# Patient Record
Sex: FEMALE | Race: WHITE | NOT HISPANIC OR LATINO
[De-identification: names, ages, dates, MRNs, and addresses within clinical notes are randomized per-mention and may not be internally consistent; named-entity substitution may affect disease eponyms.]

---

## 2017-11-01 ENCOUNTER — TRANSCRIPTION ENCOUNTER (OUTPATIENT)
Age: 52
End: 2017-11-01

## 2017-12-06 ENCOUNTER — APPOINTMENT (OUTPATIENT)
Dept: FAMILY MEDICINE | Facility: CLINIC | Age: 52
End: 2017-12-06
Payer: COMMERCIAL

## 2017-12-06 ENCOUNTER — NON-APPOINTMENT (OUTPATIENT)
Age: 52
End: 2017-12-06

## 2017-12-06 VITALS
OXYGEN SATURATION: 98 % | BODY MASS INDEX: 25.91 KG/M2 | HEART RATE: 80 BPM | SYSTOLIC BLOOD PRESSURE: 114 MMHG | DIASTOLIC BLOOD PRESSURE: 72 MMHG | HEIGHT: 68 IN | WEIGHT: 171 LBS

## 2017-12-06 DIAGNOSIS — Z83.3 FAMILY HISTORY OF DIABETES MELLITUS: ICD-10-CM

## 2017-12-06 PROCEDURE — 93000 ELECTROCARDIOGRAM COMPLETE: CPT

## 2017-12-06 PROCEDURE — 99396 PREV VISIT EST AGE 40-64: CPT | Mod: 25

## 2018-01-04 ENCOUNTER — APPOINTMENT (OUTPATIENT)
Dept: FAMILY MEDICINE | Facility: CLINIC | Age: 53
End: 2018-01-04

## 2018-01-23 LAB
25(OH)D3 SERPL-MCNC: 35.2 NG/ML
ALBUMIN SERPL ELPH-MCNC: 4.2 G/DL
ALP BLD-CCNC: 65 U/L
ALT SERPL-CCNC: 18 U/L
ANION GAP SERPL CALC-SCNC: 11 MMOL/L
AST SERPL-CCNC: 20 U/L
BASOPHILS # BLD AUTO: 0.02 K/UL
BASOPHILS NFR BLD AUTO: 0.3 %
BILIRUB SERPL-MCNC: 0.5 MG/DL
BUN SERPL-MCNC: 15 MG/DL
CALCIUM SERPL-MCNC: 9.4 MG/DL
CHLORIDE SERPL-SCNC: 102 MMOL/L
CHOLEST SERPL-MCNC: 188 MG/DL
CHOLEST/HDLC SERPL: 2.2 RATIO
CO2 SERPL-SCNC: 27 MMOL/L
CREAT SERPL-MCNC: 0.6 MG/DL
EOSINOPHIL # BLD AUTO: 0.11 K/UL
EOSINOPHIL NFR BLD AUTO: 1.7 %
GLUCOSE SERPL-MCNC: 85 MG/DL
HAV IGM SER QL: NONREACTIVE
HBV CORE IGM SER QL: NONREACTIVE
HBV SURFACE AG SER QL: NONREACTIVE
HCT VFR BLD CALC: 46.2 %
HCV AB SER QL: NONREACTIVE
HCV S/CO RATIO: 0.08 S/CO
HDLC SERPL-MCNC: 86 MG/DL
HGB BLD-MCNC: 14.4 G/DL
HIV1+2 AB SPEC QL IA.RAPID: NONREACTIVE
HSV 1+2 IGG SER IA-IMP: NEGATIVE
HSV 1+2 IGG SER IA-IMP: POSITIVE
HSV1 IGG SER QL: 2.98 INDEX
HSV1 IGM SER QL: NORMAL TITER
HSV2 AB FLD-ACNC: NORMAL TITER
HSV2 IGG SER QL: 0.08 INDEX
IMM GRANULOCYTES NFR BLD AUTO: 0.2 %
LDLC SERPL CALC-MCNC: 92 MG/DL
LYMPHOCYTES # BLD AUTO: 2.42 K/UL
LYMPHOCYTES NFR BLD AUTO: 36.8 %
MAN DIFF?: NORMAL
MCHC RBC-ENTMCNC: 30.1 PG
MCHC RBC-ENTMCNC: 31.2 GM/DL
MCV RBC AUTO: 96.7 FL
MONOCYTES # BLD AUTO: 0.51 K/UL
MONOCYTES NFR BLD AUTO: 7.8 %
NEUTROPHILS # BLD AUTO: 3.5 K/UL
NEUTROPHILS NFR BLD AUTO: 53.2 %
PLATELET # BLD AUTO: 237 K/UL
POTASSIUM SERPL-SCNC: 5.2 MMOL/L
PROT SERPL-MCNC: 6.7 G/DL
RBC # BLD: 4.78 M/UL
RBC # FLD: 14.2 %
SODIUM SERPL-SCNC: 140 MMOL/L
T PALLIDUM AB SER QL IA: NEGATIVE
T4 FREE SERPL-MCNC: 1.2 NG/DL
TRIGL SERPL-MCNC: 51 MG/DL
TSH SERPL-ACNC: 1.67 UIU/ML
WBC # FLD AUTO: 6.57 K/UL

## 2019-03-12 ENCOUNTER — APPOINTMENT (OUTPATIENT)
Dept: FAMILY MEDICINE | Facility: CLINIC | Age: 54
End: 2019-03-12
Payer: COMMERCIAL

## 2019-03-12 VITALS
HEART RATE: 84 BPM | BODY MASS INDEX: 26.22 KG/M2 | WEIGHT: 173 LBS | SYSTOLIC BLOOD PRESSURE: 110 MMHG | DIASTOLIC BLOOD PRESSURE: 70 MMHG | HEIGHT: 68 IN

## 2019-03-12 PROCEDURE — 99396 PREV VISIT EST AGE 40-64: CPT

## 2019-03-12 NOTE — PLAN
[FreeTextEntry1] : COLONOSCOPY ADVISED\par NEED MAMMOGRAM RESULTS\par ROUTINE GYN EXAMS\par EKG: DECLINED\par HEALTHY DIET AND LIFESTYLE MODIFICATIONS\par HEALTHY WEIGHT LOSS \par CHECK ROUTINE LAB WORK\par FOLLOW-UP ALL SPECIALISTS AS DIRECTED \par CONSIDER SHINGLES VACCINE\par CALL WITH ANY QUESTIONS, CONCERNS OR CHANGES

## 2019-03-12 NOTE — HISTORY OF PRESENT ILLNESS
[FreeTextEntry1] : wellness exam [de-identified] : MS. CHOWDHURY IS A PLEASANT 54 YO PRESENTING FOR YEARLY WELLNESS EXAM.  FEELING WELL TODAY.  HISTORY OF POSITIVE MADHURI PREVIOUSLY EVALUATED BY RHEUMATOLOGY AND NO INTERVENTION WAS NEEDED.  HAS A CHRONIC HISTORY OF VARICOSE VEINS - PLANS TO SEE VASCULAR SURGERY.   HAS HAD NO HEADACHE, DIZZINESS, CHEST PAIN, SHORTNESS OF BREATH, GI OR URINARY COMPLAINTS.  NO OTHER COMPLAINTS TODAY. \par \par DERMATOLOGY: DR. GILES - YEARLY SCREENING\par YEARLY EYE EXAM\par GYN: DR. ROLAND

## 2019-03-12 NOTE — HEALTH RISK ASSESSMENT
[Good] : ~his/her~  mood as  good [No falls in past year] : Patient reported no falls in the past year [0] : 2) Feeling down, depressed, or hopeless: Not at all (0) [Patient reported mammogram was normal] : Patient reported mammogram was normal [Patient reported PAP Smear was normal] : Patient reported PAP Smear was normal [HIV test declined] : HIV test declined [Hepatitis C test declined] : Hepatitis C test declined [None] : None [With Family] : lives with family [Employed] : employed [College] : College [] :  [# Of Children ___] : has [unfilled] children [Feels Safe at Home] : Feels safe at home [Fully functional (bathing, dressing, toileting, transferring, walking, feeding)] : Fully functional (bathing, dressing, toileting, transferring, walking, feeding) [Fully functional (using the telephone, shopping, preparing meals, housekeeping, doing laundry, using] : Fully functional and needs no help or supervision to perform IADLs (using the telephone, shopping, preparing meals, housekeeping, doing laundry, using transportation, managing medications and managing finances) [Smoke Detector] : smoke detector [Carbon Monoxide Detector] : carbon monoxide detector [Safety elements used in home] : safety elements used in home [Seat Belt] :  uses seat belt [Sunscreen] : uses sunscreen [With Patient/Caregiver] : With Patient/Caregiver [Designated Healthcare Proxy] : Designated healthcare proxy [Name: ___] : Health Care Proxy's Name: [unfilled]  [Relationship: ___] : Relationship: [unfilled] [] : No [de-identified] : SOCIALLY [de-identified] : GOES TO THE GYM 4 - 5 DAYS WEEK, CYCLE/SPIN, BODY CONDITIONING [de-identified] : DIET IS "GOOD", WEIGHT WATCHERS [[id number]] : 0 [Change in mental status noted] : No change in mental status noted [Language] : denies difficulty with language [Learning/Retaining New Information] : denies difficulty learning/retaining new information [Handling Complex Tasks] : denies difficulty handling complex tasks [High Risk Behavior] : no high risk behavior [Reports changes in hearing] : Reports no changes in hearing [Reports changes in vision] : Reports no changes in vision [Reports normal functional visual acuity (ie: able to read med bottle)] : Reports poor functional visual acuity.  [Reports changes in dental health] : Reports no changes in dental health [MammogramDate] : 01/19 [MammogramComments] : [facility] [PapSmearDate] : 01/19 [PapSmearComments] : DR. BENTON [de-identified] : PART-TIME [de-identified] : [employer]  [AdvancecareDate] : 03/19 [FreeTextEntry4] : SECONDARY HEALTH CARE PROXY: MAGGY CHOWDHURY - DAUGHTER

## 2019-03-27 ENCOUNTER — RESULT REVIEW (OUTPATIENT)
Age: 54
End: 2019-03-27

## 2019-03-27 LAB
25(OH)D3 SERPL-MCNC: 19.3 NG/ML
ALBUMIN SERPL ELPH-MCNC: 4.2 G/DL
ALP BLD-CCNC: 64 U/L
ALT SERPL-CCNC: 19 U/L
ANION GAP SERPL CALC-SCNC: 13 MMOL/L
APPEARANCE: CLEAR
AST SERPL-CCNC: 24 U/L
BASOPHILS # BLD AUTO: 0.03 K/UL
BASOPHILS NFR BLD AUTO: 0.5 %
BILIRUB SERPL-MCNC: 0.4 MG/DL
BILIRUBIN URINE: NEGATIVE
BLOOD URINE: NEGATIVE
BUN SERPL-MCNC: 14 MG/DL
CALCIUM SERPL-MCNC: 9.4 MG/DL
CHLORIDE SERPL-SCNC: 102 MMOL/L
CHOLEST SERPL-MCNC: 201 MG/DL
CHOLEST/HDLC SERPL: 2.3 RATIO
CO2 SERPL-SCNC: 25 MMOL/L
COLOR: COLORLESS
CREAT SERPL-MCNC: 0.63 MG/DL
EOSINOPHIL # BLD AUTO: 0.1 K/UL
EOSINOPHIL NFR BLD AUTO: 1.7 %
GLUCOSE QUALITATIVE U: NEGATIVE
GLUCOSE SERPL-MCNC: 86 MG/DL
HCT VFR BLD CALC: 45 %
HDLC SERPL-MCNC: 87 MG/DL
HEMOCCULT STL QL IA: NEGATIVE
HGB BLD-MCNC: 14.3 G/DL
IMM GRANULOCYTES NFR BLD AUTO: 0.2 %
KETONES URINE: NEGATIVE
LDLC SERPL CALC-MCNC: 99 MG/DL
LEUKOCYTE ESTERASE URINE: NEGATIVE
LYMPHOCYTES # BLD AUTO: 2.33 K/UL
LYMPHOCYTES NFR BLD AUTO: 39.3 %
MAN DIFF?: NORMAL
MCHC RBC-ENTMCNC: 30.6 PG
MCHC RBC-ENTMCNC: 31.8 GM/DL
MCV RBC AUTO: 96.2 FL
MONOCYTES # BLD AUTO: 0.5 K/UL
MONOCYTES NFR BLD AUTO: 8.4 %
NEUTROPHILS # BLD AUTO: 2.96 K/UL
NEUTROPHILS NFR BLD AUTO: 49.9 %
NITRITE URINE: NEGATIVE
PH URINE: 7
PLATELET # BLD AUTO: 213 K/UL
POTASSIUM SERPL-SCNC: 4.4 MMOL/L
PROT SERPL-MCNC: 6.6 G/DL
PROTEIN URINE: NEGATIVE
RBC # BLD: 4.68 M/UL
RBC # FLD: 13.4 %
SODIUM SERPL-SCNC: 140 MMOL/L
SPECIFIC GRAVITY URINE: 1
T4 FREE SERPL-MCNC: 1.2 NG/DL
TRIGL SERPL-MCNC: 76 MG/DL
TSH SERPL-ACNC: 2.29 UIU/ML
UROBILINOGEN URINE: NORMAL
WBC # FLD AUTO: 5.93 K/UL

## 2019-03-29 LAB — ANA SER IF-ACNC: NEGATIVE

## 2019-09-29 ENCOUNTER — TRANSCRIPTION ENCOUNTER (OUTPATIENT)
Age: 54
End: 2019-09-29

## 2019-10-01 ENCOUNTER — APPOINTMENT (OUTPATIENT)
Dept: ULTRASOUND IMAGING | Facility: CLINIC | Age: 54
End: 2019-10-01
Payer: COMMERCIAL

## 2019-10-01 ENCOUNTER — RESULT REVIEW (OUTPATIENT)
Age: 54
End: 2019-10-01

## 2019-10-01 ENCOUNTER — OUTPATIENT (OUTPATIENT)
Dept: OUTPATIENT SERVICES | Facility: HOSPITAL | Age: 54
LOS: 1 days | End: 2019-10-01
Payer: COMMERCIAL

## 2019-10-01 DIAGNOSIS — Z00.00 ENCOUNTER FOR GENERAL ADULT MEDICAL EXAMINATION WITHOUT ABNORMAL FINDINGS: ICD-10-CM

## 2019-10-01 PROCEDURE — 10005 FNA BX W/US GDN 1ST LES: CPT

## 2019-10-01 PROCEDURE — 88173 CYTOPATH EVAL FNA REPORT: CPT

## 2019-10-01 PROCEDURE — 88173 CYTOPATH EVAL FNA REPORT: CPT | Mod: 26

## 2019-10-02 ENCOUNTER — APPOINTMENT (OUTPATIENT)
Dept: ORTHOPEDIC SURGERY | Facility: CLINIC | Age: 54
End: 2019-10-02
Payer: COMMERCIAL

## 2019-10-02 PROCEDURE — 99203 OFFICE O/P NEW LOW 30 MIN: CPT

## 2019-10-02 PROCEDURE — 72170 X-RAY EXAM OF PELVIS: CPT

## 2019-10-02 NOTE — PHYSICAL EXAM
[de-identified] : General Exam\par \par Well developed, well nourished\par No apparent distress\par Oriented to person, place, and time\par Mood: Normal\par Affect: Normal\par Balance and coordination: Normal\par Gait: Normal\par \par Right hip exam\par \par Skin: Clean/dry and intact\par Inspection: No obvious deformity, no swelling, no ecchymosis.\par Tenderness:  no tenderness over greater trochanter/glut medius insertion. No tenderness pubic symphysis, pubic tubercle, hip flexors. No ttp ischial tuberosity or buttock. No ttp over the ASIS/Illiac crest.\par ROM: 0-120°. Internal rotation 20 external rotation 70\par Painful ROM: None\par Additional tests: No pain with circumduction negative impingement test at 90° mildly positive impingement test at 60° negative Manisha negative [facility]\par Strength: 4/5 hip flexion 5/5 ADD/ABD/Q/H/TA/GS/EHL\par Neuro: Sensation in tact to light touch throughout in dp/sp/tib/jocelin/saph distributions\par Pulses: 2+ DP/PT pulses\par  [de-identified] : \par The following radiographs were ordered and read by me during this patients visit. I reviewed each radiograph in detail with the patient and discussed the findings as highlighted below. \par \par AP pelvis radiograph is obtained today. There is no fracture. bilateral cam-type acetabular\par

## 2019-10-02 NOTE — DISCUSSION/SUMMARY
[de-identified] : \par Right quadriceps strain. She has pain in her anterior quadriceps region she has weakness with resisted hip flexion she has no pain with hip range of motion given prescription for Mobic take with food stop for stomach upset physical therapy followup one month all questions answered\par

## 2019-10-02 NOTE — HISTORY OF PRESENT ILLNESS
[de-identified] : 54y female presents with proximal right thigh pain x1 month. She states she went to a total body conditioning workout class and did not stretch after. Since then she has had the pain without radiation or numbness/tingling. She has tried Motrin with some relief. She states that since she started resting it this week it is doing better. \par \par The patient's past medical history, past surgical history, medications, allergies, and social history were reviewed by me today with the patient and documented accordingly. In addition, the patient's family history, which is noncontributory to this visit, was also reviewed.\par

## 2019-10-30 ENCOUNTER — APPOINTMENT (OUTPATIENT)
Dept: ORTHOPEDIC SURGERY | Facility: CLINIC | Age: 54
End: 2019-10-30

## 2019-11-19 ENCOUNTER — APPOINTMENT (OUTPATIENT)
Dept: ORTHOPEDIC SURGERY | Facility: CLINIC | Age: 54
End: 2019-11-19
Payer: COMMERCIAL

## 2019-11-19 PROCEDURE — 99213 OFFICE O/P EST LOW 20 MIN: CPT

## 2019-11-19 NOTE — HISTORY OF PRESENT ILLNESS
[de-identified] : 54y female presents with proximal right thigh pain x 2-3 months. She states she went to a total body conditioning workout class and did not stretch after. Since then she has had the pain without radiation or numbness/tingling. She has tried Motrin with some relief. She has been going to PT for at least 6 weeks with some relief but she feels like she has plateaued.

## 2019-11-19 NOTE — DISCUSSION/SUMMARY
[de-identified] : 54-year-old female continued right hip pain and weakness for almost 3 months since significant weakness of quadriceps testing continued groin pain. We'll obtain an MRI to further evaluate she will followup after MRI. All questions answered

## 2019-11-19 NOTE — PHYSICAL EXAM
[de-identified] : Right hip exam\par \par Skin: Clean/dry and intact\par Inspection: No obvious deformity, no swelling, no ecchymosis.\par Tenderness: no tenderness over greater trochanter/glut medius insertion. No tenderness pubic symphysis, pubic tubercle, hip flexors. No ttp ischial tuberosity or buttock. No ttp over the ASIS/Illiac crest.\par ROM: 0-120°. Internal rotation 20 external rotation 70\par Painful ROM: None\par Additional tests: No pain with circumduction negative impingement test at 90° mildly positive impingement test at 60° negative [facility]\par Strength: 4/5 hip flexion 5/5 ADD/ABD/Q/H/TA/GS/EHL\par Neuro: Sensation in tact to light touch throughout in dp/sp/tib/jocelin/saph distributions\par Pulses: 2+ DP/PT pulses

## 2019-11-26 ENCOUNTER — OTHER (OUTPATIENT)
Age: 54
End: 2019-11-26

## 2019-12-03 ENCOUNTER — APPOINTMENT (OUTPATIENT)
Dept: ORTHOPEDIC SURGERY | Facility: CLINIC | Age: 54
End: 2019-12-03
Payer: COMMERCIAL

## 2019-12-03 VITALS
HEIGHT: 68 IN | WEIGHT: 173 LBS | BODY MASS INDEX: 26.22 KG/M2 | DIASTOLIC BLOOD PRESSURE: 80 MMHG | SYSTOLIC BLOOD PRESSURE: 119 MMHG | HEART RATE: 80 BPM

## 2019-12-03 PROCEDURE — 99203 OFFICE O/P NEW LOW 30 MIN: CPT | Mod: 25

## 2019-12-03 PROCEDURE — 20611 DRAIN/INJ JOINT/BURSA W/US: CPT | Mod: RT

## 2019-12-03 NOTE — PHYSICAL EXAM
[de-identified] : Constitutional: Well-nourished, well-developed, No acute distress\par Respiratory:  Good respiratory effort, no SOB\par Lymphatic: No regional lymphadenopathy, no lymphedema\par Psychiatric: Pleasant and normal affect, alert and oriented x3\par Skin: Clean dry and intact B/L UE/LE\par Musculoskeletal: normal except where as noted in regional exam\par \par Right Hip:\par \par APPEARANCE: no marked deformities, no swelling or malalignment\par POSITIVE TENDERNESS: Hip flexor region, sartorius, proximal rectus femoris\par NONTENDER: greater trochanter, TFL, gluteal region, ischium/proximal hamstring region, and pubic symphysis. \par ROM: Limited in all planes due to pain. \par RESISTIVE TESTING: MMT 4+/5 and mild pain with hip flexion, painless resisted ER/IR/SLR/abduction/adduction. \par SPECIAL TESTS: + LATIA/FADIR, mild pain with loaded flexion & scouring. neg fulcrum test\par NEURO: Normal sensation of LE, DTRs 2+/4 patella and achilles\par PULSES: 2+ DP/PT pulses\par

## 2019-12-03 NOTE — HISTORY OF PRESENT ILLNESS
[de-identified] : Patient is here today for continued management of hip pain. The patient was previously evaluated by my associate Dr. Nolasco who diagnosed hip osteoarthritis. He recommended an ultrasound guided cortisone injection for diagnostic and therapeutic purposes, and the patient has presented for that injection today. No significant interval change in the overall condition since last evaluated.

## 2019-12-03 NOTE — PROCEDURE
[de-identified] : Ultrasound-Guided Diagnostic/Therapeutic Injection: Right Hip Intra-articular Injection.\par \par Indication for U/S Guidance: Ensure placement within the femoroacetabular joint for diagnostic purposes, while avoiding anterior neurovascular structures\par \par Indication for Injection: Osteoarthritis.\par \par A discussion was had with the patient regarding this procedure and all questions were answered. All risks, benefits and alternatives were discussed. These included but were not limited to bleeding, infection, and allergic reaction.  A timeout was done to ensure correct side and pt agreed to the procedure.   Betadine was used to sterilize and prep the area, and alcohol was used to clean the skin in the anterior aspect of the hip joint. The femoroacetabular joint was visualized utilizing the [employer] II Edge, the Curvilinear 30cm 5-2 MHz transducer, sterile probe cover and sterile ultrasound gel.  The joint was visualized in the longitudinal axis and an in-plane approach was used for the injection.  Ultrasound guidance was utilized to ensure accuracy of the intra-articular injection, and avoid the femoral neurovascular structures.  A 25-gauge 1.5" needle was first used to inject 3cc of 1% lidocaine without epi into the subcutaneous tissue and muscle for local anesthesia.  Following that a 22-gauge 3" needle was used to inject 2cc of 0.25% bupivacaine and 1cc of 40mg/ml methylprednisolone into the femoroacetabular joint. An image confirming the correct location of the needle placement and infusion of the steroid at the end of the injection was saved.  A sterile bandage was then applied. The patient tolerated the procedure well and there were no complications. \par \par

## 2019-12-03 NOTE — DISCUSSION/SUMMARY
[de-identified] : Discussed findings of today's exam and possible causes of patient's pain.  Educated patient on their most probable diagnosis of Right hip osteoarthritis.  Reviewed possible courses of treatment, and we collaboratively decided best course of treatment at this time will include diagnostic/therapeutic ultrasound guided steroid injection today (see procedure note).  Patient advised to make note of whether their pain is improved starting in the next few minutes until 4-6 hours while the lidocaine numbs the interior of the hip joint; this is the diagnostic part of the injection. If pain is relieved immediately that helps us determine that the etiology of the pain is coming from within the hip joint. The steroid injected into the hip today will start to have an effect in the coming days, will be most effective in the next 1-2 weeks, and may last 1-2 months; that is the therapeutic portion of this injection. \par Re-examination of the involved hip after the injection revealed noted improvement.  The patient should follow up with Dr. Nolasco, in 2 months or prn for further management.  Patient appreciates and agrees with current plan.\par \par This note was generated using dragon medical dictation software. A reasonable effort has been made for proofreading its contents, but typos may still remain. If there are any questions or points of clarification needed please notify my office.

## 2019-12-18 ENCOUNTER — APPOINTMENT (OUTPATIENT)
Dept: RHEUMATOLOGY | Facility: CLINIC | Age: 54
End: 2019-12-18
Payer: COMMERCIAL

## 2019-12-18 VITALS
WEIGHT: 183 LBS | HEART RATE: 89 BPM | SYSTOLIC BLOOD PRESSURE: 120 MMHG | HEIGHT: 68 IN | DIASTOLIC BLOOD PRESSURE: 80 MMHG | OXYGEN SATURATION: 98 % | BODY MASS INDEX: 27.74 KG/M2

## 2019-12-18 PROCEDURE — 99204 OFFICE O/P NEW MOD 45 MIN: CPT

## 2019-12-18 RX ORDER — MELOXICAM 15 MG/1
15 TABLET ORAL
Qty: 30 | Refills: 1 | Status: DISCONTINUED | COMMUNITY
Start: 2019-10-02 | End: 2019-12-18

## 2019-12-18 NOTE — REVIEW OF SYSTEMS
[Joint Pain] : joint pain [Arthralgias] : arthralgias [Feeling Tired] : feeling tired [Negative] : Endocrine

## 2019-12-18 NOTE — PHYSICAL EXAM
[General Appearance - Alert] : alert [General Appearance - Well Developed] : well developed [General Appearance - Well Nourished] : well nourished [Neck Appearance] : the appearance of the neck was normal [Sclera] : the sclera and conjunctiva were normal [Oropharynx] : the oropharynx was normal [Auscultation Breath Sounds / Voice Sounds] : lungs were clear to auscultation bilaterally [Respiration, Rhythm And Depth] : normal respiratory rhythm and effort [Edema] : there was no peripheral edema [Full Pulse] : the pedal pulses are present [Heart Sounds] : normal S1 and S2 [Abdomen Tenderness] : non-tender [Supraclavicular Lymph Nodes Enlarged Bilaterally] : supraclavicular [Cervical Lymph Nodes Enlarged Anterior Bilaterally] : anterior cervical [No Spinal Tenderness] : no spinal tenderness [FreeTextEntry1] : antalgic gait, FROM neck, shoulders, elbows, wrists, hands, hips, knees, ankles and feet, including the small joints of the hands and feet without any evidence of inflammatory arthritis right hip with restricted ROM and OA [] : no rash [Deep Tendon Reflexes (DTR)] : deep tendon reflexes were 2+ and symmetric [Motor Exam] : the motor exam was normal [Oriented To Time, Place, And Person] : oriented to person, place, and time [Impaired Insight] : insight and judgment were intact [Affect] : the affect was normal

## 2019-12-18 NOTE — HISTORY OF PRESENT ILLNESS
[FreeTextEntry1] : 54-year-old  female with no significant past medical history presents somewhat urgently as a new patient today. She states that she was in her usual state of health until September, right around menopause she started to notice pain in her right hip she noticed that she was limping. She is very active goes to the gym regularly, she uses the stairmaster as well as splints regularly. She went to see orthopedics and was told that she had moderate to severe osteoarthritis in her hip. She has since had a steroid injection with minimal relief. The plan is now to get a second opinion and then consider a hip replacement. She rates her current pain at a 7/10. It is affecting her activities of daily living and she is unable to exercise but she really misses.\par \par More recently she noticed that her left eye was red, she went to see ophthalmology and was told that she may have a systemic process. It turns out that her mother has rheumatoid arthritis. She denies blurry vision or visual symptoms or photophobia. She denies ocular symptoms in the past.\par \par She denies patchy alopecia, oral ulcers, sicca symptoms Raynaud's phenomenon. She denies arises or colitis. She denies asthma, frequent sinus infections or ear infections. She admits to 2 miscarriages and has had 2 full-term normal deliveries. She denies any issues with thromboembolism.\par \par She is an average appetite and denies weight loss. She denies fevers or chills or night sweats. She has been using meloxicam with minimal relief. She admits to morning stiffness for at least 30 minutes.

## 2019-12-18 NOTE — ASSESSMENT
[FreeTextEntry1] : 54-year-old  female with no significant past medical history presents as a new patient today. She states that she was in her usual state of health until about 3 months ago when she noticed right hip pain, she hasn't seen orthopedics and has been diagnosed with moderate to severe osteoarthritis. She is status post a steroid injection with minimal relief. More recently she has been diagnosed with possibly scleritis or episcleritis in her left eye and has recently started steroid drops. Off note her mother has rheumatoid arthritis.\par \par Her current review of systems is positive for pain in both hips but the right is worse than the left. She admits to morning stiffness. This is her first episode of inflammation in the eyes.\par \par Today, on examination there is a paucity of clinical findings to support the diagnosis of an underlying connective tissue disease. She does have osteoarthritis in her right hip with an antalgic gait as well as limited range of motion.\par \par At this time the most likely diagnosis is osteoarthritis in the right hip. The question remains whether she has an underlying autoimmune process.\par \par Right hip osteoarthritis-\par -she is waiting for a second opinion with orthopedics\par -She may be a surgical candidate at this point\par -To try indomethacin extended-release once a day p.r.n. with food\par \par NSAID use\par Risks and benefits of NSAIDS were d/w patient including but not limited to hypertension, nephrotoxicity, GI intolerance , increased risk for thromboembolism, CHF, hepatotoxicity and anemia.\par \par \par Ocular inflammation-\par -she has a recent diagnosis of either scleritis or episcleritis\par -Her review of systems is otherwise negative for an underlying connective tissue disease\par -For completion I have ordered additional labs including a workup to rule out rheumatoid arthritis, lupus, vasculitis, spondyloarthropathy and infection such as hepatitis and syphilis.\par \par \par I will be calling her with these labs. She will return to see me on a p.r.n. basis depending on the lab results and her evaluation with ophthalmology\par \par She is aware to call me if her symptoms worsen

## 2019-12-20 ENCOUNTER — LABORATORY RESULT (OUTPATIENT)
Age: 54
End: 2019-12-20

## 2019-12-31 ENCOUNTER — APPOINTMENT (OUTPATIENT)
Dept: ORTHOPEDIC SURGERY | Facility: CLINIC | Age: 54
End: 2019-12-31
Payer: COMMERCIAL

## 2019-12-31 VITALS — BODY MASS INDEX: 27.28 KG/M2 | HEIGHT: 68 IN | WEIGHT: 180 LBS

## 2019-12-31 LAB
25(OH)D3 SERPL-MCNC: 35.5 NG/ML
ALBUMIN SERPL ELPH-MCNC: 4.4 G/DL
ALP BLD-CCNC: 84 U/L
ALT SERPL-CCNC: 17 U/L
ANA PAT FLD IF-IMP: ABNORMAL
ANA SER IF-ACNC: ABNORMAL
ANION GAP SERPL CALC-SCNC: 12 MMOL/L
AST SERPL-CCNC: 24 U/L
B BURGDOR AB SER-IMP: NEGATIVE
B BURGDOR IGM PATRN SER IB-IMP: NEGATIVE
B BURGDOR18KD IGG SER QL IB: NORMAL
B BURGDOR23KD IGG SER QL IB: NORMAL
B BURGDOR23KD IGM SER QL IB: NORMAL
B BURGDOR28KD IGG SER QL IB: NORMAL
B BURGDOR30KD IGG SER QL IB: PRESENT
B BURGDOR31KD IGG SER QL IB: NORMAL
B BURGDOR39KD IGG SER QL IB: NORMAL
B BURGDOR39KD IGM SER QL IB: NORMAL
B BURGDOR41KD IGG SER QL IB: PRESENT
B BURGDOR41KD IGM SER QL IB: NORMAL
B BURGDOR45KD IGG SER QL IB: NORMAL
B BURGDOR58KD IGG SER QL IB: NORMAL
B BURGDOR66KD IGG SER QL IB: NORMAL
B BURGDOR93KD IGG SER QL IB: NORMAL
BASOPHILS # BLD AUTO: 0.03 K/UL
BASOPHILS NFR BLD AUTO: 0.4 %
BILIRUB SERPL-MCNC: 0.5 MG/DL
BUN SERPL-MCNC: 13 MG/DL
CALCIUM SERPL-MCNC: 9.7 MG/DL
CARDIOLIPIN IGM SER-MCNC: 7.2 MPL
CARDIOLIPIN IGM SER-MCNC: <5 GPL
CCP AB SER IA-ACNC: <8 UNITS
CHLORIDE SERPL-SCNC: 102 MMOL/L
CO2 SERPL-SCNC: 29 MMOL/L
CREAT SERPL-MCNC: 0.78 MG/DL
CREAT SPEC-SCNC: 110 MG/DL
CREAT/PROT UR: 0.1 RATIO
CRP SERPL-MCNC: 0.84 MG/DL
DSDNA AB SER-ACNC: <12 IU/ML
ENA RNP AB SER IA-ACNC: >8 AL
ENA SM AB SER IA-ACNC: 0.2 AL
ENA SS-A AB SER IA-ACNC: <0.2 AL
ENA SS-B AB SER IA-ACNC: <0.2 AL
EOSINOPHIL # BLD AUTO: 0.1 K/UL
EOSINOPHIL NFR BLD AUTO: 1.4 %
ERYTHROCYTE [SEDIMENTATION RATE] IN BLOOD BY WESTERGREN METHOD: 19 MM/HR
GLUCOSE SERPL-MCNC: 94 MG/DL
HAV IGM SER QL: NONREACTIVE
HBV CORE IGM SER QL: NONREACTIVE
HBV SURFACE AG SER QL: NONREACTIVE
HCT VFR BLD CALC: 44.2 %
HCV AB SER QL: NONREACTIVE
HCV S/CO RATIO: 0.09 S/CO
HGB BLD-MCNC: 14.2 G/DL
HLA-B27 RELATED AG QL: NORMAL
IMM GRANULOCYTES NFR BLD AUTO: 0.3 %
LUPUS ANTICOAGULANT CASCADE REFLEX: NORMAL
LYMPHOCYTES # BLD AUTO: 2.36 K/UL
LYMPHOCYTES NFR BLD AUTO: 32.7 %
MAN DIFF?: NORMAL
MCHC RBC-ENTMCNC: 30.3 PG
MCHC RBC-ENTMCNC: 32.1 GM/DL
MCV RBC AUTO: 94.2 FL
MONOCYTES # BLD AUTO: 0.58 K/UL
MONOCYTES NFR BLD AUTO: 8 %
MPO AB + PR3 PNL SER: NORMAL
NEUTROPHILS # BLD AUTO: 4.12 K/UL
NEUTROPHILS NFR BLD AUTO: 57.2 %
PLATELET # BLD AUTO: 230 K/UL
POTASSIUM SERPL-SCNC: 4.1 MMOL/L
PROT SERPL-MCNC: 6.9 G/DL
PROT UR-MCNC: 7 MG/DL
RBC # BLD: 4.69 M/UL
RBC # FLD: 13.2 %
RF+CCP IGG SER-IMP: NEGATIVE
RHEUMATOID FACT SER QL: <10 IU/ML
RPR SER-TITR: NORMAL
SODIUM SERPL-SCNC: 143 MMOL/L
THYROGLOB AB SERPL-ACNC: <20 IU/ML
THYROPEROXIDASE AB SERPL IA-ACNC: <10 IU/ML
WBC # FLD AUTO: 7.21 K/UL

## 2019-12-31 PROCEDURE — 99213 OFFICE O/P EST LOW 20 MIN: CPT

## 2019-12-31 NOTE — REASON FOR VISIT
[Initial Visit] : an initial visit for [Hip Pain] : hip pain [Spouse] : spouse [Family Member] : family member

## 2019-12-31 NOTE — HISTORY OF PRESENT ILLNESS
[de-identified] : This is a very nice 54 year old woman experiencing Rt hip pain and difficulty walking since Sept 2019.  The pain is moderate in intensity, but she states she is more concerned with the function of the hip.  The pain is exacerbated by walking and stairs.  Medications she has tried include: mobic which did not help.  She was evaluated by the rheumatologist.  She has tried physical therapy with no relief.  She has not been using a cane / walker / wheelchair.  She has had a prior injection of cortisone which helped only about 20% she estimates for a few hours.  She denies numbness and tingling in the extremity.  The pain somewhat limits activities of daily living. Walking tolerance is reduced. \par \par

## 2019-12-31 NOTE — PHYSICAL EXAM
[de-identified] : Patient is well nourished, well-developed, in no acute distress, with appropriate mood and affect. The patient is oriented to time, place, and person. Gait evaluation reveals a limp. There is no inguinal adenopathy. Examination of the contralateral hip shows normal range of motion, strength, no tenderness, and intact skin. The affected limb is well-perfused, shows a grossly normal motor and sensory examination. Examination of the hip shows no skin lesions. Hip motion is reduced and causes pain. Leg lengths are approximately equal. Stinchfield test is positive. Both hips are stable and muscle strength is normal. Pedal pulses are palpable.\par \par  [de-identified] : AP and lateral x-rays of the right hip, pelvis, and femur were brought in by the patient which I reviewed and demonstrate mild degenerative joint disease of the hip with joint space narrowing, osteophyte formation, and subchondral sclerosis as well as coxa magna.\par \par The patient brings in an MRI of the right hip that demonstrates moderate osteoarthritis and degeneration in the right hip with labral tearing.

## 2019-12-31 NOTE — DISCUSSION/SUMMARY
[de-identified] : This patient has right hip mild to moderate osteoarthritis with degenerative labral tearing and coxa magna.  I am concerned about the fact that she only experienced 20% relief with the intra-articular hip cortisone injection with the bupivacaine.  It is possible that the pain she is experiencing is also coming from her lumbar spine I would like her to see a physiatry us for further work-up and management of this.  If the pain still is not improving then we can was consider total hip arthroplasty in the future.  Follow-up on a as needed basis.

## 2020-01-03 ENCOUNTER — APPOINTMENT (OUTPATIENT)
Dept: PHYSICAL MEDICINE AND REHAB | Facility: CLINIC | Age: 55
End: 2020-01-03
Payer: COMMERCIAL

## 2020-01-03 VITALS
HEART RATE: 74 BPM | SYSTOLIC BLOOD PRESSURE: 118 MMHG | TEMPERATURE: 98.5 F | DIASTOLIC BLOOD PRESSURE: 78 MMHG | OXYGEN SATURATION: 98 %

## 2020-01-03 DIAGNOSIS — M47.817 SPONDYLOSIS W/OUT MYELOPATHY OR RADICULOPATHY, LUMBOSACRAL REGION: ICD-10-CM

## 2020-01-03 PROCEDURE — 99204 OFFICE O/P NEW MOD 45 MIN: CPT

## 2020-01-05 NOTE — HISTORY OF PRESENT ILLNESS
[FreeTextEntry1] : Location: right hip and low back\par Quality: sharp in groin, low back is aching (low back is mild, hip pain is severe)\par Duration: 4 months \par Frequency: intermittent, but constant with movement - hip, intermittent in low back  ( worst after prolonged immobility) \par Alleviating Factors:rest (hip), stretching (low back)\par Aggravating Factors: walking (hip), (immobility (low back)\par Conservative treatment tried: had hip steroid injection for hip pain, nothing for low back\par Associated Symptoms: no paresthesia\par Prior Studies: MRI of right hip shows moderate to severe OA\par

## 2020-01-05 NOTE — ASSESSMENT
[FreeTextEntry1] : Will discuss with Ortho about diagnostic Marcaine injection into hip joint with fluoroscopic guidance\par \par Start PT for low back pain\par \par Protonix for GI prophylaxis as patient is planning on taking Diclofenac.\par

## 2020-01-05 NOTE — PHYSICAL EXAM
[FreeTextEntry1] : \par \par \par   [Antalgic Gait Right] : antalgic on the right [Normal Motor Tone] : the muscle tone was normal [Involuntary Movements] : no involuntary movements were seen [de-identified] : No gross deformity, TTP of the lumbar paraspinals bilaterally, ROM limited with extension and oblique extension bilaterally, SLR  negative bilaterally\par  [Normal] : Oriented to person, place, and time, insight and judgement were intact and the affect was normal

## 2020-01-06 ENCOUNTER — TRANSCRIPTION ENCOUNTER (OUTPATIENT)
Age: 55
End: 2020-01-06

## 2020-01-14 ENCOUNTER — APPOINTMENT (OUTPATIENT)
Dept: PHYSICAL MEDICINE AND REHAB | Facility: CLINIC | Age: 55
End: 2020-01-14

## 2020-01-24 ENCOUNTER — APPOINTMENT (OUTPATIENT)
Dept: PHYSICAL MEDICINE AND REHAB | Facility: CLINIC | Age: 55
End: 2020-01-24

## 2020-01-29 LAB
ANA PAT FLD IF-IMP: ABNORMAL
ANA PATTERN: ABNORMAL
ANA SER IF-ACNC: ABNORMAL
ANA TITER: ABNORMAL
CENTROMERE IGG SER-ACNC: <0.2 CD:130001892
ENA RNP AB SER IA-ACNC: >8 AL
ENA SM AB SER IA-ACNC: 0.2 AL

## 2020-02-03 ENCOUNTER — APPOINTMENT (OUTPATIENT)
Dept: RHEUMATOLOGY | Facility: CLINIC | Age: 55
End: 2020-02-03
Payer: COMMERCIAL

## 2020-02-03 VITALS
SYSTOLIC BLOOD PRESSURE: 120 MMHG | HEART RATE: 99 BPM | WEIGHT: 182 LBS | OXYGEN SATURATION: 97 % | DIASTOLIC BLOOD PRESSURE: 72 MMHG | BODY MASS INDEX: 27.67 KG/M2 | TEMPERATURE: 97.7 F

## 2020-02-03 PROCEDURE — 99214 OFFICE O/P EST MOD 30 MIN: CPT

## 2020-02-03 NOTE — ASSESSMENT
[FreeTextEntry1] : 54-year-old  female with no significant past medical history. She states that she was in her usual state of health until about 3 months ago when she noticed right hip pain, she hast seen orthopedics and has been diagnosed with moderate to severe osteoarthritis. She is status post a steroid injection with minimal relief. \par \par Her current review of systems is positive for pain in both hips but the right is worse than the left. She admits to morning stiffness. \par \par Today, on examination there is a paucity of clinical findings to support the diagnosis of an underlying connective tissue disease. She does have osteoarthritis in her right hip with an antalgic gait as well as limited range of motion.\par \par At this time the most likely diagnosis is osteoarthritis in the right hip.\par \par Right hip osteoarthritis-\par -she is waiting for a second opinion with orthopedics\par -She may be a surgical candidate at this point\par -To try indomethacin extended-release once a day p.r.n. with food\par \par NSAID use\par Risks and benefits of NSAIDS were d/w patient including but not limited to hypertension, nephrotoxicity, GI intolerance , increased risk for thromboembolism, CHF, hepatotoxicity and anemia.\par \par \par Ocular inflammation-\par -she has a recent diagnosis of either scleritis or episcleritis\par -I obtained her medical records, it looks like she was treated for conjunctivitis with TobraDex eyedrops\par \par Positive MADHURI-\par On Last set of labs her MADHURI is positive, she has a RNP antibody. The significance of these labs is unclear. She denies Raynaud's phenomenon. At this time she does not have any features consistent with lupus myositis scleroderma. We'll continue with clinical surveillance. For completion she is to have a baseline chest x-ray and a baseline echocardiogram\par \par Followup 8-9 months unless there is any change in her symptoms

## 2020-02-03 NOTE — HISTORY OF PRESENT ILLNESS
[FreeTextEntry1] : 54-year-old  female with no significant past medical history. She states that she was in her usual state of health until September, right around menopause she started to notice pain in her right hip she noticed that she was limping. She is very active goes to the gym regularly, she uses the stairmaster as well as splints regularly. She went to see orthopedics and was told that she had moderate to severe osteoarthritis in her hip. She has since had a steroid injection with minimal relief. The plan is now to get a second opinion and then consider a hip replacement. She rates her current pain at a 7/10. It is affecting her activities of daily living and she is unable to exercise but she really misses.\par \par More recently she noticed that her left eye was red, she went to see ophthalmology and was told that she may have a systemic process. It turns out that her mother has rheumatoid arthritis. She denies blurry vision or visual symptoms or photophobia. She denies ocular symptoms in the past.\par \par She denies patchy alopecia, oral ulcers, sicca symptoms Raynaud's phenomenon. She denies arises or colitis. She denies asthma, frequent sinus infections or ear infections. She admits to 2 miscarriages and has had 2 full-term normal deliveries. She denies any issues with thromboembolism.\par \par I had to return for a followup, her labs reveal a RNP antibody on 2 occasions.\par \par She is an average appetite and denies weight loss. She denies fevers or chills or night sweats. She has been using meloxicam with minimal relief. She admits to morning stiffness for at least 30 minutes.

## 2020-02-03 NOTE — PHYSICAL EXAM
[General Appearance - Alert] : alert [General Appearance - Well Nourished] : well nourished [General Appearance - Well Developed] : well developed [Sclera] : the sclera and conjunctiva were normal [Oropharynx] : the oropharynx was normal [Neck Appearance] : the appearance of the neck was normal [Respiration, Rhythm And Depth] : normal respiratory rhythm and effort [Auscultation Breath Sounds / Voice Sounds] : lungs were clear to auscultation bilaterally [Heart Sounds] : normal S1 and S2 [Full Pulse] : the pedal pulses are present [Edema] : there was no peripheral edema [Abdomen Tenderness] : non-tender [Cervical Lymph Nodes Enlarged Anterior Bilaterally] : anterior cervical [Supraclavicular Lymph Nodes Enlarged Bilaterally] : supraclavicular [No Spinal Tenderness] : no spinal tenderness [FreeTextEntry1] : antalgic gait, FROM neck, shoulders, elbows, wrists, hands, hips, knees, ankles and feet, including the small joints of the hands and feet without any evidence of inflammatory arthritis right hip with restricted ROM and OA [Deep Tendon Reflexes (DTR)] : deep tendon reflexes were 2+ and symmetric [] : no rash [Motor Exam] : the motor exam was normal [Oriented To Time, Place, And Person] : oriented to person, place, and time [Impaired Insight] : insight and judgment were intact [Affect] : the affect was normal

## 2020-02-07 ENCOUNTER — APPOINTMENT (OUTPATIENT)
Dept: ORTHOPEDIC SURGERY | Facility: CLINIC | Age: 55
End: 2020-02-07
Payer: COMMERCIAL

## 2020-02-07 VITALS
HEART RATE: 73 BPM | WEIGHT: 182 LBS | DIASTOLIC BLOOD PRESSURE: 81 MMHG | HEIGHT: 68 IN | SYSTOLIC BLOOD PRESSURE: 135 MMHG | BODY MASS INDEX: 27.58 KG/M2

## 2020-02-07 PROCEDURE — 73502 X-RAY EXAM HIP UNI 2-3 VIEWS: CPT | Mod: RT

## 2020-02-07 PROCEDURE — 99214 OFFICE O/P EST MOD 30 MIN: CPT

## 2020-02-07 NOTE — ADDENDUM
[FreeTextEntry1] : I, Jean Paul Jewell, acted solely as a scribe for Dr. Tejinder Bowman on this date 02/07/2020.

## 2020-02-07 NOTE — HISTORY OF PRESENT ILLNESS
[7] : a current pain level of 7/10 [Intermit.] : ~He/She~ states the symptoms seem to be intermittent [Walking] : worsened by walking [Rest] : relieved by rest [Physical Therapy] : relieved by physical therapy [Worsening] : worsening [de-identified] : 54 year old female here for evaluation of right hip pain.  Patent states pain started in September 2019 after starting gym.  patient locates pain to anterior hip and groin. She reports pain is worsening overall. She reports pain is worse with ascending stairs and ambulating. She notes limitations to her activities due to pain. Patient was on indocin for pain from Rheumatology however stopped due to intolerance. patient was on Mobic with no relief. Patient was seen in December by Dr. Callahan who administered Intra-articular injection to right with with minimal improvement. patient was seen by Dr. Wang as well and PMR due to back pain. Patient has attempted PT in the past noting improvement.

## 2020-02-07 NOTE — REVIEW OF SYSTEMS
[Joint Pain] : joint pain [Joint Stiffness] : joint stiffness [Joint Swelling] : joint swelling [Negative] : Heme/Lymph [FreeTextEntry9] : right hip

## 2020-02-07 NOTE — PHYSICAL EXAM
[LE] : Sensory: Intact in bilateral lower extremities [ALL] : dorsalis pedis, posterior tibial, femoral, popliteal, and radial 2+ and symmetric bilaterally [Normal] : Alert and in no acute distress [Antalgic] : antalgic [Obese] : not obese [Poor Appearance] : well-appearing [Acute Distress] : not in acute distress [de-identified] : Right hip exam shows able to straight leg raise with pain, positive Stinchfield maneuver, groin pain with external rotation, she has lost internal rotation, no tenderness over the greater trochanteric bursa. \par Left hip exam shows able to straight leg raise without pain. [de-identified] : GENERAL APPEARANCE: Well nourished and hydrated, pleasant, alert, and oriented x 3. Appears their stated age. \par HEENT: Normocephalic, extraocular eye motion intact. Nasal septum midline. Oral cavity clear. External auditory canal clear. \par RESPIRATORY: Breath sounds clear and audible in all lobes. No wheezing, No accessory muscle use.\par CARDIOVASCULAR: No apparent abnormalities. No lower leg edema. No varicosities. Pedal pulses are palpable.\par NEUROLOGIC: Sensation is normal, no muscle weakness in the upper or lower extremities.\par DERMATOLOGIC: No apparent skin lesions, moist, warm, no rash.\par SPINE: Cervical spine appears normal and moves freely; thoracic spine appears normal and moves freely; lumbosacral spine appears normal and moves freely, normal, nontender.\par MUSCULOSKELETAL: Hands, wrists, and elbows are normal and move freely, shoulders are normal and move freely.  [de-identified] : MRI of the right hip obtained 11/21/2019 at [facility] shows bilateral hip joint effusions, moderate on the right, trace on the left. Chronic degenerative tear of the anterosuperior labrum of the right acetabulum, with a 1.1 cm paralabral ganglion cyst. Moderate-to-severe right hip osteoarthritis. 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 See above, correlate clinically.\par \par 3V Xray of the right hip and pelvis done in office today and reviewed by Dr. Tejinder Bowman demonstrates  moderate to early advanced osteoarthritis of the right hip.

## 2020-02-07 NOTE — DISCUSSION/SUMMARY
[Medication Risks Reviewed] : Medication risks reviewed [Surgical risks reviewed] : Surgical risks reviewed [de-identified] : 54 year old  female with right hip osteoarthritis. Her MRI of the right hip from November 2019 demonstrated moderate-to-severe right hip osteoarthritis. Based on imaging she is a candidate for right KORY, but I recommended that she continue with conservative treatment for as long as possible. She has already failed to respond to intraarticular cortisone injection. We discussed use of anti-inflammatory medication for pain relief, and I provided her with a prescription for Diclofenac. F/U 6 weeks for reassessment. \par \par Total hip arthroplasty: A hip replacement means a resurfacing of both surfaces of the hip, with metal, ceramic and/or plastic parts. The prosthetic parts are usually press fit into bone, and only rarely cemented into position. Patients are allowed to weight bear as tolerated in most cases. The postoperative motion is determined by multiple factors the most important of which is preoperative motion. In general, the better the motion pre operatively, the better the motion post operatively. The operation, pending medical clearance, generally requires a hospitalization of 3-4 days. In general, we prefer to perform the procedure under epidural or general anesthesia. Preoperatively we institute a nomogram for blood management which may include the administration of procrit. The operative procedure takes approximately 1-2 hours. The operation requires an oblique incision anywhere from 4-6 inches over the posterolateral hip region. Post operatively the patient is walking the day of or the day after surgery. The first couple of days are very painful and the pain medication will alleviate but not eliminate the pain. Thus the patient must really push hard to get their mobility back. Our goal for having the person go home is that they can walk with a walker or a cane. The walking aide is to be dispensed with once the patient is secure enough. In general, there is no cast or braces required with a routine hip replacement. In the long term, we do not encourage our patients to run for the sake of running; although, pending their pre operative status, we often allow patients to play doubles tennis or comparable activities. We also allow them to do gentle intermediate downhill skiing if they are truly an expert skier. Biking is encouraged as well as swimming. The follow up periods are usually at 3 weeks, 6 weeks, 3 months, and yearly intervals. Potential complications with total hip replacements include anaesthetic complications and death, infection (less than 1%), nerve damage, and in the case of a sciatic nerve injury, a permanent foot drop, (a flapping foot with ambulation that requires bracing). There are always areas of skin numbness but this is not an untoward effect nor do we consider it a complication. Other potential complications include dislocation of the hip component (less than 5%). In cases of recurrent dislocation revision surgery may be required. The loosening of either the acetabular or femoral component is much more infrequent. The components may wear, creating particulate debris, loosening and systemic complications. Specific concerns exist with all bearing surfaces such as metal sensitivity with metal on metal components, and the risk of fracture and squeaking with ceramic components. Major blood vessel damage is also extremely rare. Theoretically because of the anatomic proximity of the femoral artery, it could be lacerated with subsequent repairs required. Albeit unlikely, a disruption of the femoral artery could theoretically result in an amputation. Similarly, infection could theoretically result in an amputation if one were to grow out an organism that cannot be controlled with antibiotics. Most infections require removal of the prosthesis, placement of an antibiotic spacer, IV antibiotics for eradication, prior to reimplantation. General medical complications include phlebitis for which we prophylactically anticoagulate but it could still occur and fatal pulmonary embolus has been reported. Cardiovascular problems, such as a heart attack or ischemia are always a concern with such hemodynamic changes in the blood vascular system. There is a risk of leg length discrepancy and the need for a shoe lift. Other general complications are very rare but anything in medicine could theoretically happen. I think the patient understands the risk benefit ratio of total hip replacement and will think about whether they would like to pursue an operative or non-operative option.  I reviewed the plan of care as well as a model of a total hip implant equivalent to the one that will be used for their total hip joint replacement. The patient agreed to the plan of care as well as the use of implants for their hip total joint replacement.\par

## 2020-04-07 ENCOUNTER — EMERGENCY (EMERGENCY)
Facility: HOSPITAL | Age: 55
LOS: 0 days | Discharge: HOME | End: 2020-04-07
Payer: COMMERCIAL

## 2020-04-07 VITALS
DIASTOLIC BLOOD PRESSURE: 67 MMHG | HEART RATE: 96 BPM | OXYGEN SATURATION: 97 % | SYSTOLIC BLOOD PRESSURE: 112 MMHG | TEMPERATURE: 100 F | RESPIRATION RATE: 18 BRPM

## 2020-04-07 DIAGNOSIS — R05 COUGH: ICD-10-CM

## 2020-04-07 DIAGNOSIS — K50.90 CROHN'S DISEASE, UNSPECIFIED, WITHOUT COMPLICATIONS: ICD-10-CM

## 2020-04-07 DIAGNOSIS — M79.10 MYALGIA, UNSPECIFIED SITE: ICD-10-CM

## 2020-04-07 DIAGNOSIS — B34.9 VIRAL INFECTION, UNSPECIFIED: ICD-10-CM

## 2020-04-07 DIAGNOSIS — R50.9 FEVER, UNSPECIFIED: ICD-10-CM

## 2020-04-07 PROCEDURE — 99283 EMERGENCY DEPT VISIT LOW MDM: CPT

## 2020-04-07 PROCEDURE — 99283 EMERGENCY DEPT VISIT LOW MDM: CPT | Mod: 25

## 2020-04-07 PROCEDURE — 87635 SARS-COV-2 COVID-19 AMP PRB: CPT

## 2020-04-07 NOTE — ED STATDOCS - OBJECTIVE STATEMENT
She has a PMHx of Crohn's dx.  SHe presents reporting 3-4 days of cough, fever, chills, body aches.  Her daughter is an RT at [employer] so she is concerned she was exposed to COVID19.

## 2020-04-07 NOTE — ED ADULT TRIAGE NOTE - CHIEF COMPLAINT QUOTE
Patient presents for COVID-19 testing; complains of fever, chills.   Patient provides verbal consent to receive results via text or email.

## 2020-04-07 NOTE — ED STATDOCS - NSFOLLOWUPINSTRUCTIONS_ED_ALL_ED_FT
How to get your Coronavirus (COVID-19) Testing Results:   Please be advised that you were tested for the coronavirus (COVID-19) in the Emergency Department at [facility].  You are to maintain self-quarantine procedures for 14 days until instructed otherwise by one of our healthcare agents. Please note that the test may take up to 2-4 days to result.  If you do not hear from us within 72 hours and you'd like to check on your results, you can call on of our coronavirus specialists at [address] [place] [address] (available 24/7).  Please DO NOT call the site where you received the test to obtain your results.

## 2020-04-07 NOTE — ED STATDOCS - PATIENT PORTAL LINK FT
You can access the FollowMyHealth Patient Portal offered by [facility] by registering at the following website: [email or web address]. By joining [facility]’s FollowMyHealth portal, you will also be able to view your health information using other applications (apps) compatible with our system.

## 2020-04-08 LAB — SARS-COV-2 RNA SPEC QL NAA+PROBE: SIGNIFICANT CHANGE UP

## 2020-04-14 ENCOUNTER — APPOINTMENT (OUTPATIENT)
Dept: FAMILY MEDICINE | Facility: CLINIC | Age: 55
End: 2020-04-14
Payer: COMMERCIAL

## 2020-04-14 VITALS — BODY MASS INDEX: 27.28 KG/M2 | HEIGHT: 68 IN | RESPIRATION RATE: 12 BRPM | WEIGHT: 180 LBS

## 2020-04-14 PROCEDURE — 99213 OFFICE O/P EST LOW 20 MIN: CPT | Mod: 95

## 2020-04-14 NOTE — HISTORY OF PRESENT ILLNESS
[Home] : at home, [unfilled] , at the time of the visit. [Medical Office: ([facility])___] : at ~his/her~ medical office located in [place] [Patient] : the patient [Self] : self [FreeTextEntry8] : START TIME: 11:03\par END TIME: 11:16\par \par MS. CHOWDHURY IS A PLEASANT 55 YO.  TODAY WITH COMPLAINT OF SYMPTOMS SINCE APRIL 4TH.  WAS HAVING FEVER AND CHILLS AS WELL AS BODY ACHES.  GOT TESTED FOR COVID AND WAS ADVISED IT WAS NEGATIVE.  FEVER RESOLVED.  HAD HEADACHES.  FINGER TIPS TINGLING SENSATION.  STILL TIRED AND ACHY.  SOME CHILLS.  LAST FEVER WAS ON APRIL 7TH.  NO COUGH .  VERY LITTLE CONGESTION.  NO SORE THROAT.  NO DIARRHEA.  HAS TAKEN TYLENOL.  DAUGHTER IS A RESPIRATORY THERAPIST AT THE HOSPITAL.  IS FEELING BETTER.  NO DIZZINESS OR SYNCOPE.  NO SHORTNESS OF BREATH OR WHEEZING.

## 2020-04-14 NOTE — PHYSICAL EXAM
[No Acute Distress] : no acute distress [Well Nourished] : well nourished [Well-Appearing] : well-appearing [No Respiratory Distress] : no respiratory distress  [Normal Affect] : the affect was normal [Normal Mood] : the mood was normal [Normal Insight/Judgement] : insight and judgment were intact

## 2020-04-14 NOTE — REVIEW OF SYSTEMS
[Chills] : chills [Fatigue] : fatigue [Nasal Discharge] : nasal discharge [Headache] : headache [Earache] : no earache [Sore Throat] : no sore throat [Chest Pain] : no chest pain [Palpitations] : no palpitations [Shortness Of Breath] : no shortness of breath [Wheezing] : no wheezing [Cough] : no cough [Abdominal Pain] : no abdominal pain [Diarrhea] : diarrhea [Vomiting] : no vomiting [Dizziness] : no dizziness [Fainting] : no fainting

## 2020-04-14 NOTE — PLAN
[FreeTextEntry1] : ALTHOUGH COVID TESTING NEGATIVE; SYMPTOMS STILL SUGGESTIVE OF POSITIVE COVID AND POSSIBLE FALSE NEGATIVE NEGATIVE\par SYMPTOMS HAVE IMPROVED\par CONTINUE SUPPORTIVE CARE: REST, FLUIDS\par TYLENOL PRN\par TO CALL IF SYMPTOMS PERSIST/WORSEN CHANGE\par REVIEWED WHEN TO ESCALATE CARE; SOB, ETC.\par DISCUSSED CDC RECOMMENDATIONS FOR SELF-ISOLATION\par CALL WITH ANY QUESTIONS, CONCERNS OR CHANGES

## 2020-04-21 ENCOUNTER — TRANSCRIPTION ENCOUNTER (OUTPATIENT)
Age: 55
End: 2020-04-21

## 2020-04-23 VITALS — WEIGHT: 180 LBS | HEIGHT: 68 IN | BODY MASS INDEX: 27.28 KG/M2

## 2020-04-23 RX ORDER — MESALAMINE 1.2 G/1
TABLET, DELAYED RELEASE ORAL
Refills: 0 | Status: DISCONTINUED | COMMUNITY
End: 2020-04-23

## 2020-04-23 RX ORDER — INDOMETHACIN 75 MG/1
75 CAPSULE, EXTENDED RELEASE ORAL DAILY
Qty: 30 | Refills: 2 | Status: DISCONTINUED | COMMUNITY
Start: 2019-12-18 | End: 2020-04-23

## 2020-04-24 ENCOUNTER — APPOINTMENT (OUTPATIENT)
Dept: NEUROLOGY | Facility: CLINIC | Age: 55
End: 2020-04-24
Payer: COMMERCIAL

## 2020-04-24 PROCEDURE — 99203 OFFICE O/P NEW LOW 30 MIN: CPT | Mod: 95

## 2020-04-24 NOTE — PHYSICAL EXAM
[FreeTextEntry1] : Exam limited due to limitations of telephonic visit necessitated by coronavirus situation.\par \par There is no cervical palpation tenderness.\par There is full range of movement of the cervical spine\par \par There is full range of movement of the lumbar spine.\par Straight leg raising is negative bilaterally.\par \par Tinels sign negative at wrists.\par \par Mental status: Alert, fully oriented, speech comprehension intact, recent memory intact\par \par Cranial nerves: No ptosis  Extraocular movements full. Facial strength and sensation are normal. Tongue midline.\par \par Motor: Strength grossly normal throughout. There is no drift. Unable to assess tone. No abnormal movements observed.\par \par Sensation: Intact to touch throughout\par \par Coordination: Finger-nose intact\par \par Reflexes unable to check\par \par Gait grossly normal.\par

## 2020-04-24 NOTE — HISTORY OF PRESENT ILLNESS
[FreeTextEntry1] : Developed fever 4/4. Suspected Covid, but tested negative. 4/12 developed tingling both hands which has persisted. There all the time. Also has moderately severe arm pain. No significant neck pain. Recently developed non-radiating back pain. Also has some tingling around mouth. Nsaid no help. Rheumatology workup has been negative.\par \par Recent GI workup - possible Chron's vs UC

## 2020-04-24 NOTE — REASON FOR VISIT
[Other Location: e.g. Home (Enter Location, City,State)___] : at [unfilled] [Home] : at home, [unfilled] , at the time of the visit. [Spouse] : spouse [Patient] : the patient [Consultation] : a consultation visit [Self] : self

## 2020-04-24 NOTE — ASSESSMENT
[FreeTextEntry1] : Possible carpal tunnel, possible neuropathy ?Covid related.\par \par Will start low dose gabapentin 100mg bid, set up for EMG.

## 2020-05-06 ENCOUNTER — APPOINTMENT (OUTPATIENT)
Dept: ORTHOPEDIC SURGERY | Facility: CLINIC | Age: 55
End: 2020-05-06

## 2020-05-19 ENCOUNTER — RX CHANGE (OUTPATIENT)
Age: 55
End: 2020-05-19

## 2020-06-25 ENCOUNTER — APPOINTMENT (OUTPATIENT)
Dept: NEUROLOGY | Facility: CLINIC | Age: 55
End: 2020-06-25
Payer: COMMERCIAL

## 2020-06-25 VITALS
BODY MASS INDEX: 26.52 KG/M2 | DIASTOLIC BLOOD PRESSURE: 92 MMHG | HEIGHT: 68 IN | HEART RATE: 82 BPM | WEIGHT: 175 LBS | SYSTOLIC BLOOD PRESSURE: 135 MMHG

## 2020-06-25 PROCEDURE — 99244 OFF/OP CNSLTJ NEW/EST MOD 40: CPT

## 2020-06-26 NOTE — REVIEW OF SYSTEMS
[Feeling Poorly] : feeling poorly [Tingling] : tingling [Numbness] : numbness [Abnormal Sensation] : an abnormal sensation [Migraine Headache] : migraine headaches [As Noted in HPI] : as noted in HPI [Arthralgias] : arthralgias [Negative] : Heme/Lymph [Confused or Disoriented] : no confusion [Memory Lapses or Loss] : no memory loss [Difficulty with Language] : no ~M difficulty with language [Decr. Concentrating Ability] : no decrease in concentrating ability [Changed Thought Patterns] : no change in thought patterns [Facial Weakness] : no facial weakness [Arm Weakness] : no arm weakness [Hand Weakness] : no hand weakness [Leg Weakness] : no leg weakness [Poor Coordination] : good coordination [Difficulty Writing] : no difficulty writing [Difficulties in Speech] : no speech difficulties [Hypersensitivity] : no hypersensitivity [Seizures] : no convulsions [Dizziness] : no dizziness [Lightheadedness] : no lightheadedness [Fainting] : no fainting [Vertigo] : no vertigo [Cluster Headache] : no cluster headache [Tension Headache] : no tension-type headache [Difficulty Walking] : no difficulty walking [Inability to Walk] : able to walk [Frequent Falls] : not falling [Ataxia] : no ataxia [Limping] : not limping

## 2020-06-26 NOTE — DATA REVIEWED
[de-identified] : MRI of the brain with IV contrast revealed minimal non-specific white matter signal abnormalities in June 2020.  MRI of the cervical spine in June 2020 showed multilevel degenerative changes without any cord signals and 11 mm nodule of the right thyroid lobe and was compared with the previous MRI of April 2020. [de-identified] : I reviewed SS EP studies which was recorded as abnormal in the ascending somatosensory pathway at the brachial plexus including lower extremity SEPs but I do not have the records of actual tracing. [de-identified] : I reviewed the detailed neurological consultation dated 5/14/2020 with a history of abnormal sensation in the face tongue hands and forearm and across her torso including treatment with IV steroids which were not helpful and her neurological examination revealed diffuse absence of deep tendon reflexes without any significant neurological abnormalities and was advised that she could have transverse myelitis with cranial nerve dysfunction and uveitis raising the possibility of atypical multiple sclerosis and other rheumatological disorders and was advised to continue gabapentin.\par \par There are extensive blood test reports and I reviewed the entire report which shows some inflammatory markers including MADHURI and minimal elevation of sedimentation rate but majority of the rheumatological testing was unremarkable.

## 2020-06-26 NOTE — DISCUSSION/SUMMARY
[FreeTextEntry1] : Opinion–the patient's symptoms begin with uveitis and has sensory dysfunction without any large fiber involvement and objectively there is no evidence of peripheral sensorimotor neuropathy but her deep tendon reflexes are absent and I advised her to obtain a detailed electrophysiologic study and if it is normal I will undertake epidermal biopsy meanwhile she was advised to have a lumbar puncture and CSF analysis and I agree and advised her to pursue the same.  I strongly suspect a rheumatological disorder as some of her blood tests have raised the suspicion of the same and should discuss this again with Dr. Richardson.  I will also call her neurologist to send me the pictures of her SSEP and might repeat it at [facility] because it does not correlate clinically as all large fibers appear completely normal without any evidence of radiculopathy or myelopathy.  I had a detailed conversation with the patient with extensive education and counseling.  She understands and will proceed with my advice.

## 2020-06-26 NOTE — HISTORY OF PRESENT ILLNESS
[FreeTextEntry1] : Ms. Vicente is a 55-year-old  female referred by Dr. Theresa Villarreal for neurological second opinion and also advised by Dr. Chance Giron.\par \par The patient stated that in October 2019 she developed right hip pain and was evaluated by an orthopedic surgeon who treated her with physical therapy and suggested the diagnosis of moderate osteoarthritis and gave her medications and injection and MRI was positive but deferred surgery at the present time and is following the patient.  She occasionally gets left hip discomfort but is not as severe.  In December 2019 she developed bilateral uveitis and was evaluated by rheumatologist and ophthalmologist and extensive investigations have been undertaken and treated with local corticosteroid eyedrops.  She was evaluated by Dr. Richardson her rheumatologist who undertook several blood tests and according to the patient advised her that there were no definite findings.  She was also seen by a gastroenterologist Dr. Glass who undertook colonoscopy in March 2020 because of family history of Crohn's disease and advised her that she does not have Crohn's disease or ulcerative colitis but the blood test reportedly revealed low titers for Crohn's disease and ulcerative colitis but she has no GI symptoms.\par \par She was also evaluated by Dr. Harrison who performed S EP studies which were reportedly abnormal and advised her lumbar puncture for CSF analysis.\par \par Current complaints–at the present time the patient stated that since 4/20/2020 she has tingling sensation in the fingertips affecting all fingers of both hands and tingling sensation around the arms and upper torso.  Mouth area has some painful sensation but below the navel she has no symptoms of any sensorimotor dysfunction and was treated with IV steroids and received 5 courses in May 2020 without any help.\par \par Review of systems failed to reveal any headache diplopia dysarthria dysphagia or dyspnea and there is no vision loss or ptosis and denied any focal or lateralized weakness, bowel bladder or sexual dysfunction and there is no perianal numbness ataxia or incoordination.  There is no history of anxiety depression cognitive language or memory dysfunction.

## 2020-06-26 NOTE — PHYSICAL EXAM
[General Appearance - Alert] : alert [General Appearance - In No Acute Distress] : in no acute distress [Oriented To Time, Place, And Person] : oriented to person, place, and time [Impaired Insight] : insight and judgment were intact [Affect] : the affect was normal [Person] : oriented to person [Place] : oriented to place [Time] : oriented to time [Visual Intact] : visual attention was ~T not ~L decreased [Concentration Intact] : normal concentrating ability [Naming Objects] : no difficulty naming common objects [Repeating Phrases] : no difficulty repeating a phrase [Writing A Sentence] : no difficulty writing a sentence [Fluency] : fluency intact [Comprehension] : comprehension intact [Reading] : reading intact [Past History] : adequate knowledge of personal past history [Cranial Nerves Optic (II)] : visual acuity intact bilaterally,  visual fields full to confrontation, pupils equal round and reactive to light [Cranial Nerves Oculomotor (III)] : extraocular motion intact [Cranial Nerves Trigeminal (V)] : facial sensation intact symmetrically [Cranial Nerves Facial (VII)] : face symmetrical [Cranial Nerves Vestibulocochlear (VIII)] : hearing was intact bilaterally [Cranial Nerves Accessory (XI - Cranial And Spinal)] : head turning and shoulder shrug symmetric [Cranial Nerves Glossopharyngeal (IX)] : tongue and palate midline [Cranial Nerves Hypoglossal (XII)] : there was no tongue deviation with protrusion [Motor Tone] : muscle tone was normal in all four extremities [Motor Strength] : muscle strength was normal in all four extremities [Sensation Tactile Decrease] : light touch was intact [No Muscle Atrophy] : normal bulk in all four extremities [Abnormal Walk] : normal gait [Balance] : balance was intact [2+] : Ankle jerk left 2+ [FreeTextEntry1] : General examination–vital signs were recorded and unremarkable.  Head neck, ear nose and throat was unremarkable.  Throat is clear without any inflammation and currently there is no redness in the eye and the anterior chamber looks normal without any clear evidence of uveitis and lens is normal.  There are no signs of meningeal irritation.  Pedal pulsations are normal.  Cervical and lumbar spine range of motion is normal.  Abdomen is soft and there is no tenderness.  Chest is clear and heart sounds are normal.  There is no leg edema.  I inspected her skin throughout and there is no rash nodularity of the muscles or tenderness.\par \par Neurological examination is completely normal and I did not notice any sensory dysfunction of reproducible nature other than subjective sensation of tingling in the fingertips and upper torso including arms.  There is no Tinel sign.  Her deep tendon reflexes are almost absent everywhere.  There is no history of cold exposure. [Past-pointing] : there was no past-pointing [Tremor] : no tremor present [Plantar Reflex Right Only] : normal on the right [Plantar Reflex Left Only] : normal on the left

## 2020-07-02 ENCOUNTER — APPOINTMENT (OUTPATIENT)
Dept: GASTROENTEROLOGY | Facility: CLINIC | Age: 55
End: 2020-07-02
Payer: COMMERCIAL

## 2020-07-02 ENCOUNTER — NON-APPOINTMENT (OUTPATIENT)
Age: 55
End: 2020-07-02

## 2020-07-02 ENCOUNTER — APPOINTMENT (OUTPATIENT)
Dept: FAMILY MEDICINE | Facility: CLINIC | Age: 55
End: 2020-07-02
Payer: COMMERCIAL

## 2020-07-02 VITALS
SYSTOLIC BLOOD PRESSURE: 138 MMHG | WEIGHT: 175 LBS | BODY MASS INDEX: 26.52 KG/M2 | HEIGHT: 68 IN | HEART RATE: 90 BPM | DIASTOLIC BLOOD PRESSURE: 80 MMHG

## 2020-07-02 VITALS
OXYGEN SATURATION: 98 % | HEART RATE: 95 BPM | DIASTOLIC BLOOD PRESSURE: 60 MMHG | WEIGHT: 174 LBS | TEMPERATURE: 97.9 F | SYSTOLIC BLOOD PRESSURE: 90 MMHG | HEIGHT: 68 IN | BODY MASS INDEX: 26.37 KG/M2

## 2020-07-02 PROCEDURE — 99204 OFFICE O/P NEW MOD 45 MIN: CPT

## 2020-07-02 PROCEDURE — 99396 PREV VISIT EST AGE 40-64: CPT | Mod: 25

## 2020-07-02 PROCEDURE — 93000 ELECTROCARDIOGRAM COMPLETE: CPT

## 2020-07-02 RX ORDER — PANTOPRAZOLE 40 MG/1
40 TABLET, DELAYED RELEASE ORAL
Qty: 30 | Refills: 0 | Status: DISCONTINUED | COMMUNITY
Start: 2020-01-03 | End: 2020-07-02

## 2020-07-02 RX ORDER — PANTOPRAZOLE 20 MG/1
20 TABLET, DELAYED RELEASE ORAL DAILY
Qty: 60 | Refills: 0 | Status: DISCONTINUED | COMMUNITY
Start: 2020-02-07 | End: 2020-07-02

## 2020-07-02 RX ORDER — DICLOFENAC SODIUM 75 MG/1
75 TABLET, DELAYED RELEASE ORAL
Qty: 60 | Refills: 1 | Status: DISCONTINUED | COMMUNITY
Start: 2020-02-07 | End: 2020-07-02

## 2020-07-02 NOTE — ASSESSMENT
[FreeTextEntry1] : Abnormal  ASCA testing\par Suggests increased risk of CD (as does family history, as does her uveitis) BUT is NOT diagnostic of CD\par Given absence of any GI complaints would NOT recommend repeat colonoscopy with biopsies at this time\par As i discussed with the patient, would however have a low threshold for repeat colonoscopy if GI complaints should begin.\par For now, management of her uveitis and neuropathy should direct the course of her treatment\par \par Patient expresses good understanding of my recommendation\par Patient to follow up as needed\par \par patient referred by Dr Theresa Paige

## 2020-07-02 NOTE — HISTORY OF PRESENT ILLNESS
[de-identified] : dictation inactive\par patient in office visit\par second opinion\par \par 55 yr female, recent normal screening colonoscopy.  No biopsies\par NO GI complaints\par \par Also recent diagnosis of uveitis, on topical steroid drops\par Current GI ordered IBD serology testing, elevated ASCA,.\par Started patient on mesalamine for 8 weeks , dc due to neuropathy\par See neurology, ? association\par Awaiting spinal tap\par \par FH Crohn's, her dad

## 2020-07-02 NOTE — PHYSICAL EXAM
[General Appearance - Alert] : alert [Sclera] : the sclera and conjunctiva were normal [PERRL With Normal Accommodation] : pupils were equal in size, round, and reactive to light [Extraocular Movements] : extraocular movements were intact [General Appearance - In No Acute Distress] : in no acute distress [Oropharynx] : the oropharynx was normal [Outer Ear] : the ears and nose were normal in appearance [Neck Appearance] : the appearance of the neck was normal [Jugular Venous Distention Increased] : there was no jugular-venous distention [Neck Cervical Mass (___cm)] : no neck mass was observed [Thyroid Diffuse Enlargement] : the thyroid was not enlarged [Auscultation Breath Sounds / Voice Sounds] : lungs were clear to auscultation bilaterally [Thyroid Nodule] : there were no palpable thyroid nodules [Murmurs] : no murmurs [Heart Rate And Rhythm] : heart rate was normal and rhythm regular [Heart Sounds Gallop] : no gallops [Heart Sounds] : normal S1 and S2 [Heart Sounds Pericardial Friction Rub] : no pericardial rub [Edema] : there was no peripheral edema [Abdomen Soft] : soft [Abdomen Tenderness] : non-tender [Bowel Sounds] : normal bowel sounds [Abdomen Mass (___ Cm)] : no abdominal mass palpated [Cervical Lymph Nodes Enlarged Posterior Bilaterally] : posterior cervical [Cervical Lymph Nodes Enlarged Anterior Bilaterally] : anterior cervical [Supraclavicular Lymph Nodes Enlarged Bilaterally] : supraclavicular [Axillary Lymph Nodes Enlarged Bilaterally] : axillary [Femoral Lymph Nodes Enlarged Bilaterally] : femoral [No CVA Tenderness] : no ~M costovertebral angle tenderness [Inguinal Lymph Nodes Enlarged Bilaterally] : inguinal [Abnormal Walk] : normal gait [No Spinal Tenderness] : no spinal tenderness [Musculoskeletal - Swelling] : no joint swelling seen [Motor Tone] : muscle strength and tone were normal [Nail Clubbing] : no clubbing  or cyanosis of the fingernails [Skin Color & Pigmentation] : normal skin color and pigmentation [Skin Turgor] : normal skin turgor [Impaired Insight] : insight and judgment were intact [] : no rash [Oriented To Time, Place, And Person] : oriented to person, place, and time [Affect] : the affect was normal

## 2020-07-04 NOTE — REVIEW OF SYSTEMS
[Negative] : Psychiatric [Joint Pain] : joint pain [Headache] : no headache [Joint Swelling] : no joint swelling [Fainting] : no fainting [Dizziness] : no dizziness [de-identified] : SEE HPI

## 2020-07-04 NOTE — HISTORY OF PRESENT ILLNESS
[FreeTextEntry1] : CPE [de-identified] : MS. CHOWDHURY IS A PLEASANT 56 YO PRESENTING FOR YEARLY CPE.  HAS BEEN ACTIVELY SEEING OPHTHALMOLOGY WHO DIAGNOSED HER WITH BILATERAL UVEITIS.  ALSO SAW RHEUMATOLOGY AND NEUROLOGY.  WAS EVALUATED FOR A TINGLING SENSATION IN THE FINGERTIPS AROUND THE ARMS AND UPPER TORSO.  ALSO GETS SYMPTOMS IN HER MOUTH.  SHE HAS HAD FIVE ROUNDS OF IV STEROIDS.  HAS BEEN ADVISED TO PURSUE A LUMBAR PUNCTURE.   HAD COLONOSCOPY.\par \par lmp: 6/3/19\par NEUROLOGY: DR. WINCHESTER\par OPHTHALMOLOGY: DR. ANDERSEN\par GI: DR. SKYLAR CORREA\par GYN: DR. MCCORMACK\par ENDOCRINOLOGY: DR. LANTIGUA - HAD BENIGN THYROID BIOPSY SEPTEMBER 2019\par ORTHOPEDICS: DR. TONG

## 2020-07-04 NOTE — PHYSICAL EXAM
[No Acute Distress] : no acute distress [Well Nourished] : well nourished [PERRL] : pupils equal round and reactive to light [Well-Appearing] : well-appearing [Normal Sclera/Conjunctiva] : normal sclera/conjunctiva [Normal Outer Ear/Nose] : the outer ears and nose were normal in appearance [Normal TMs] : both tympanic membranes were normal [Normal Oropharynx] : the oropharynx was normal [Supple] : supple [No Respiratory Distress] : no respiratory distress  [No Lymphadenopathy] : no lymphadenopathy [No Accessory Muscle Use] : no accessory muscle use [Clear to Auscultation] : lungs were clear to auscultation bilaterally [Normal Rate] : normal rate  [Regular Rhythm] : with a regular rhythm [Normal S1, S2] : normal S1 and S2 [Soft] : abdomen soft [Non Tender] : non-tender [Normal Bowel Sounds] : normal bowel sounds [Grossly Normal Strength/Tone] : grossly normal strength/tone [No Joint Swelling] : no joint swelling [No Rash] : no rash [No Focal Deficits] : no focal deficits [Coordination Grossly Intact] : coordination grossly intact [Normal Gait] : normal gait [Speech Grossly Normal] : speech grossly normal [Normal Affect] : the affect was normal [Normal Mood] : the mood was normal [de-identified] : DIMINISHED DTR'S

## 2020-07-04 NOTE — PLAN
[FreeTextEntry1] : HAS RX FOR MAMMOGRAM FROM GYN\par VISION SCREENING\par HEALTHY DIET AND LIFESTYLE MODIFICATIONS\par EKG: NSR AT 76 BPM, NO ACUTE ST-T WAVE CHANGES\par FLU VACCINE IN THE FALL RECOMMENDED\par NEED CONSULTANT NOTES FROM SPECIALISTS\par FOLLOW-UP ALL SPECIALISTS AS DIRECTED \par CALL WITH ANY QUESTIONS, CONCERNS OR CHANGES

## 2020-07-04 NOTE — HEALTH RISK ASSESSMENT
[Good] : ~his/her~ current health as good [Very Good] : ~his/her~  mood as very good [No] : In the past 12 months have you used drugs other than those required for medical reasons? No [No falls in past year] : Patient reported no falls in the past year [HIV Test offered] : HIV Test offered [Hepatitis C test offered] : Hepatitis C test offered [None] : None [# of Members in Household ___] :  household currently consist of [unfilled] member(s) [With Family] : lives with family [College] : College [# Of Children ___] : has [unfilled] children [] :  [Feels Safe at Home] : Feels safe at home [Fully functional (bathing, dressing, toileting, transferring, walking, feeding)] : Fully functional (bathing, dressing, toileting, transferring, walking, feeding) [Fully functional (using the telephone, shopping, preparing meals, housekeeping, doing laundry, using] : Fully functional and needs no help or supervision to perform IADLs (using the telephone, shopping, preparing meals, housekeeping, doing laundry, using transportation, managing medications and managing finances) [Reports normal functional visual acuity (ie: able to read med bottle)] : Reports normal functional visual acuity [Smoke Detector] : smoke detector [Carbon Monoxide Detector] : carbon monoxide detector [Seat Belt] :  uses seat belt [Safety elements used in home] : safety elements used in home [Sunscreen] : uses sunscreen [With Patient/Caregiver] : With Patient/Caregiver [Designated Healthcare Proxy] : Designated healthcare proxy [Name: ___] : Health Care Proxy's Name: [unfilled]  [Relationship: ___] : Relationship: [unfilled] [] : No [de-identified] : VIDEO EXERCISES 5 - 6 TIMES A WEEK [de-identified] : "FINE"  "GOOD".  "PRETTY HEALTH" [Change in mental status noted] : No change in mental status noted [Language] : denies difficulty with language [Learning/Retaining New Information] : denies difficulty learning/retaining new information [Reports changes in vision] : Reports no changes in vision [Reports changes in dental health] : Reports no changes in dental health [Handling Complex Tasks] : denies difficulty handling complex tasks [MammogramDate] : 01/19 [PapSmearDate] : 12/19 [PapSmearComments] : DR. NAVARRO [ColonoscopyDate] : 03/20 [de-identified] : ON MEDICAL LEAVE [de-identified] : READING GLASSES [AdvancecareDate] : 06/20

## 2020-07-10 ENCOUNTER — APPOINTMENT (OUTPATIENT)
Dept: NEUROLOGY | Facility: CLINIC | Age: 55
End: 2020-07-10

## 2020-07-13 LAB
C TRACH RRNA SPEC QL NAA+PROBE: NOT DETECTED
HAV IGM SER QL: NONREACTIVE
HBV CORE IGM SER QL: NONREACTIVE
HBV SURFACE AG SER QL: NONREACTIVE
HCV AB SER QL: NONREACTIVE
HCV S/CO RATIO: 0.08 S/CO
HIV1+2 AB SPEC QL IA.RAPID: NONREACTIVE
HSV 1+2 IGG SER IA-IMP: NEGATIVE
HSV 1+2 IGG SER IA-IMP: POSITIVE
HSV1 IGG SER QL: 6.68 INDEX
HSV1 IGM SER QL: NORMAL TITER
HSV2 AB FLD-ACNC: NORMAL TITER
HSV2 IGG SER QL: 0.19 INDEX
N GONORRHOEA RRNA SPEC QL NAA+PROBE: NOT DETECTED
SOURCE AMPLIFICATION: NORMAL
T PALLIDUM AB SER QL IA: NEGATIVE

## 2020-07-14 ENCOUNTER — APPOINTMENT (OUTPATIENT)
Dept: NEUROLOGY | Facility: CLINIC | Age: 55
End: 2020-07-14
Payer: COMMERCIAL

## 2020-07-14 VITALS
HEART RATE: 94 BPM | SYSTOLIC BLOOD PRESSURE: 100 MMHG | BODY MASS INDEX: 26.37 KG/M2 | DIASTOLIC BLOOD PRESSURE: 60 MMHG | WEIGHT: 174 LBS | TEMPERATURE: 98.5 F | HEIGHT: 68 IN

## 2020-07-14 LAB
CHOLEST SERPL-MCNC: 258 MG/DL
CHOLEST/HDLC SERPL: 3.1 RATIO
HDLC SERPL-MCNC: 83 MG/DL
LDLC SERPL CALC-MCNC: 138 MG/DL
TRIGL SERPL-MCNC: 183 MG/DL

## 2020-07-14 PROCEDURE — 99244 OFF/OP CNSLTJ NEW/EST MOD 40: CPT

## 2020-07-15 NOTE — HISTORY OF PRESENT ILLNESS
[FreeTextEntry1] : 55-year-old right-handed female presents today for a second opinion regarding peripheral neuropathy, patient has been seen by Dr. Mccray at [facility] recently, has been under care of Dr. Mcallister, neurologist at [place].\par \par Pt reports that she continues to have feeling of weakness in both hands and arms, also feels loss of sensation in the fingertips and hands, she does admit that after starting Gabapentin (currently 300 mg t.i.d.) she has noted remarkable improvement in the burning sensation in the hands and in perioral region. She walks with a slight limp, it is related to right hip arthritis. At present, denies visual blurring, double vision, nausea, vomiting, slurring of speech, facial droop, tinnitus, dysphagia, dyspnea or headaches\par \par Patient reports that her symptoms started in the back and September 2019 when she developed right hip pain, she was seen by orthopedist, was given an injection and had MRI of the hip, in Dec 2 019 she was diagnosed with uveitis, was seen by ophthalmologist and rheumatologist Dr. Richardson, extensive blood work was done, was treated with topical steroid eyedrops, she was also diagnosed with Crohn's disease/UC after having colonoscopy by Dr. Glass in 3/2020, was treated with Mesalamine for almost 8 weeks.\par \par She reports that in mid April 2020 she started experiencing tingling and burning sensation in the fingertips of both hands, within days it extending up to both arms and upper Torso as well as to the prioral region, she reports it was severe and unbearable, she was evaluated by Dr. Mcallister, MRI scans of brain, cervical and thoracic spine, in addition to SSEP of upper / lower extremities were performed. MRI brain revealed nonspecific white matter changes, DJD in the cervical and thoracic spine without abnormal cord signal was noted, SSEP is abnormal, patient is scheduled to have a spinal tap to rule out demyelinating/autoimmune CNS disease. \par \par

## 2020-07-15 NOTE — DISCUSSION/SUMMARY
[FreeTextEntry1] : 55-year-old female started eith right hip arthritis in Sept 2019, followed by bilateral uveitis in 12/2019 and Crohn's disease/UC after having colonoscopy in 3/2020, was treated with Mesalamine for almost 8 weeks, developed paresthesias / dysesthesias in the fingertips of both hands, arms, upper Torso as well as perioral region in 4/2020, was evaluated by Dr. Mcallister, MRI brain revealed nonspecific white matter changes, MRI C- T spine showed DJD without abnormal cord signal, SSEP is abnormal, patient is scheduled to have a spinal tap to rule out demyelinating/autoimmune CNS disease. She has noted remarkable improvement of paresthesias with gabapentin 300 mg t.i.d.\par \par # Patient's symptomatology is suggestive of small fiber neuropathy, although examination with hypoactive reflexes suggests large fiber neuropathy, the In terms are more pronounced in upper extremities, I would like to rule out B12 deficiency.\par \par - As the pt has noted improvement with gabapentin I have recommended continuing same dose.\par - Labs; B12, folate and MMA level\par - Pt needs EMG/NCV studies of upper and lower extremities for evaluation of neuropathy/mononeuritis multiplex\par \par # Possibility of autoimmune disease; uveitis plus arthritis and peripheral neuropathy, extensive blood work has revealed weakly positive MADHURI, HLA-B27 is negative.\par \par - F/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 Daily's plan to have Spinal tap to rule out CNS inflammation/autoimmune/demyelinating process.

## 2020-07-15 NOTE — REASON FOR VISIT
[Consultation] : a consultation visit [FreeTextEntry1] : Second opinion regarding peripheral neuropathy

## 2020-07-15 NOTE — DATA REVIEWED
[de-identified] : 5/1/20 SSEP:Abnormal SSEP study of the upper extremities, study is indicative of dysfunction in the ascending somatosensory pathways at the brachial plexus.\par SSEP of lower extremities is abnormal. study is indicative of dysfunction in the ascending somatosensory pathways between the cervical thoracic spinal cord and thalamocortical regions\par Blood work from Dr. Richardson reviewed [de-identified] : 6/19/2020: MRI of the brain with IV contrast revealed minimal non-specific white matter signal abnormalities.\par MRI cervical spine with contrast: multilevel degenerative changes without any cord signals and 11 mm nodule of the right thyroid lobe and was compared with the previous MRI of April 2020. \par 4/29/20: MRI Thoracic spine non-contrast: Disc herniations at T7-T8 and T12 and L1 without Significant canal stenosis  \par

## 2020-07-15 NOTE — PHYSICAL EXAM
[General Appearance - Alert] : alert [General Appearance - In No Acute Distress] : in no acute distress [Impaired Insight] : insight and judgment were intact [Mood] : the mood was normal [Person] : oriented to person [Place] : oriented to place [Time] : oriented to time [Registration Intact] : recent registration memory intact [Concentration Intact] : normal concentrating ability [Naming Objects] : no difficulty naming common objects [Repeating Phrases] : no difficulty repeating a phrase [Fluency] : fluency intact [Comprehension] : comprehension intact [Current Events] : adequate knowledge of current events [Cranial Nerves Optic (II)] : visual acuity intact bilaterally,  visual fields full to confrontation, pupils equal round and reactive to light [Cranial Nerves Oculomotor (III)] : extraocular motion intact [Cranial Nerves Trigeminal (V)] : facial sensation intact symmetrically [Cranial Nerves Facial (VII)] : face symmetrical [Cranial Nerves Vestibulocochlear (VIII)] : hearing was intact bilaterally [Cranial Nerves Glossopharyngeal (IX)] : tongue and palate midline [Cranial Nerves Accessory (XI - Cranial And Spinal)] : head turning and shoulder shrug symmetric [Cranial Nerves Hypoglossal (XII)] : there was no tongue deviation with protrusion [Motor Tone] : muscle tone was normal in all four extremities [Motor Strength] : muscle strength was normal in all four extremities [No Muscle Atrophy] : normal bulk in all four extremities [Sensation Tactile Decrease] : light touch was intact [Sensation Vibration Decrease] : vibration was intact [Proprioception] : proprioception was intact [1+] : Patella left 1+ [0] : Ankle jerk left 0 [Sclera] : the sclera and conjunctiva were normal [PERRL With Normal Accommodation] : pupils were equal in size, round, reactive to light, with normal accommodation [Extraocular Movements] : extraocular movements were intact [Full Visual Field] : full visual field [Hearing Threshold Finger Rub Not Bent] : hearing was normal [Neck Cervical Mass (___cm)] : no neck mass was observed [Auscultation Breath Sounds / Voice Sounds] : lungs were clear to auscultation bilaterally [Heart Sounds] : normal S1 and S2 [Edema] : there was no peripheral edema [Arterial Pulses Carotid] : carotid pulses were normal with no bruits [No Spinal Tenderness] : no spinal tenderness [Involuntary Movements] : no involuntary movements were seen [] : no rash [Tremor] : no tremor present [Coordination - Dysmetria Impaired Finger-to-Nose Bilateral] : not present [Dysdiadochokinesia Bilaterally] : not present [FreeTextEntry8] : Mildly antalgic gait, favors left leg [FreeTextEntry1] : ROM full

## 2020-07-15 NOTE — REVIEW OF SYSTEMS
[Arm Weakness] : arm weakness [Hand Weakness] :  hand weakness [Poor Coordination] : poor coordination [Numbness] : numbness [Tingling] : tingling [Hypersensitivity] : hypersensitivity [As Noted in HPI] : as noted in HPI [Negative] : Heme/Lymph [FreeTextEntry9] : Hip pain

## 2020-07-21 LAB
FOLATE SERPL-MCNC: 18.9 NG/ML
METHYLMALONATE SERPL-SCNC: 239 NMOL/L
VIT B12 SERPL-MCNC: 1611 PG/ML

## 2020-07-24 ENCOUNTER — APPOINTMENT (OUTPATIENT)
Dept: RHEUMATOLOGY | Facility: CLINIC | Age: 55
End: 2020-07-24
Payer: COMMERCIAL

## 2020-07-24 VITALS
DIASTOLIC BLOOD PRESSURE: 80 MMHG | HEIGHT: 68 IN | TEMPERATURE: 97.6 F | BODY MASS INDEX: 26.98 KG/M2 | SYSTOLIC BLOOD PRESSURE: 140 MMHG | HEART RATE: 81 BPM | WEIGHT: 178 LBS | OXYGEN SATURATION: 98 %

## 2020-07-24 PROCEDURE — 99214 OFFICE O/P EST MOD 30 MIN: CPT

## 2020-07-24 RX ORDER — MESALAMINE 1.2 G/1
TABLET, DELAYED RELEASE ORAL
Refills: 0 | Status: DISCONTINUED | COMMUNITY
End: 2020-07-24

## 2020-07-24 RX ORDER — GABAPENTIN 100 MG/1
100 CAPSULE ORAL
Qty: 180 | Refills: 1 | Status: DISCONTINUED | COMMUNITY
Start: 2020-04-24 | End: 2020-07-24

## 2020-07-24 NOTE — PHYSICAL EXAM
[General Appearance - Alert] : alert [General Appearance - Well Nourished] : well nourished [General Appearance - Well Developed] : well developed [Sclera] : the sclera and conjunctiva were normal [Oropharynx] : the oropharynx was normal [Respiration, Rhythm And Depth] : normal respiratory rhythm and effort [Neck Appearance] : the appearance of the neck was normal [Heart Sounds] : normal S1 and S2 [Auscultation Breath Sounds / Voice Sounds] : lungs were clear to auscultation bilaterally [Full Pulse] : the pedal pulses are present [Edema] : there was no peripheral edema [Abdomen Tenderness] : non-tender [Cervical Lymph Nodes Enlarged Anterior Bilaterally] : anterior cervical [Supraclavicular Lymph Nodes Enlarged Bilaterally] : supraclavicular [No Spinal Tenderness] : no spinal tenderness [FreeTextEntry1] : antalgic gait, FROM neck, shoulders, elbows, wrists, hands, hips, knees, ankles and feet, including the small joints of the hands and feet without any evidence of inflammatory arthritis right hip with restricted ROM and OA [] : no rash [Deep Tendon Reflexes (DTR)] : deep tendon reflexes were 2+ and symmetric [Oriented To Time, Place, And Person] : oriented to person, place, and time [Motor Exam] : the motor exam was normal [Impaired Insight] : insight and judgment were intact [Affect] : the affect was normal

## 2020-07-24 NOTE — ASSESSMENT
[FreeTextEntry1] : 54-year-old  female with right hip OA \par \par Right hip osteoarthritis-\par -she is waiting for a second opinion with orthopedics\par -She may be a surgical candidate at this point\par \par Uveitis-\par -she has a recent diagnosis \par -Autoimmune workup has otherwise been negative except for a positive MADHURI with a positive RNP antibody\par \par Positive MADHURI-\par On Last set of labs her MADHURI is positive, she has a RNP antibody. The significance of these labs is unclear. She denies Raynaud's phenomenon. At this time she does not have any features consistent with lupus myositis scleroderma. We'll continue with clinical surveillance.Echo was normal\par \par Neuropathy-\par She has seen 4 different neurologists, currently being ruled out for transverse myelitis, scheduled for EMG and spinal tap. Status post 5 days of Solu-Medrol with minimal improvement, she is improved with gabapentin 3 times a day. Await complete neurological workup and will reassess complete clinical picture\par \par Followup 2-3  months unless there is any change in her symptoms

## 2020-07-24 NOTE — HISTORY OF PRESENT ILLNESS
[FreeTextEntry1] : 54-year-old  female with  right hip OA.  She went to see orthopedics and was told that she had moderate to severe osteoarthritis in her hip. She has since had a steroid injection with minimal relief. The plan is now to get a second opinion and then consider a hip replacement. She rates her current pain at a 7/10. It is affecting her activities of daily living and she is unable to exercise but she really misses.\par \par More recently she noticed that her left eye was red, she went to see ophthalmology and was told that she may have a systemic process, she has uveitis. It turns out that her mother has rheumatoid arthritis. She denies blurry vision or visual symptoms or photophobia. She denies ocular symptoms in the past.\par \par She denies patchy alopecia, oral ulcers, sicca symptoms Raynaud's phenomenon. She denies arises or colitis. She denies asthma, frequent sinus infections or ear infections. She admits to 2 miscarriages and has had 2 full-term normal deliveries. She denies any issues with thromboembolism.\par \par I had to return for a followup, her labs reveal a RNP antibody on 2 occasions. She has been ruled out for inflammatory bowel disease, she developed a reaction to mesalamine and had to stop it. She has seen for neurologist now, she first saw neurology on telemetry video and was started on gabapentin, she is using 300 t.i.d. which has helped. After that she saw 3 other neurologists, currently they all want her to get a spinal tap and possibly an EMG, with Dr. Harrison she has had steroids for 5 days with minimal improvement. She feels that they gabapentin has definitely helped.\par Her current diagnosis with Dr. ponce is transverse myelitis but imaging was normal\par \par She is an average appetite and denies weight loss. She denies fevers or chills or night sweats. She has been using meloxicam with minimal relief. She admits to morning stiffness for at least 30 minutes.

## 2020-08-12 ENCOUNTER — APPOINTMENT (OUTPATIENT)
Dept: ORTHOPEDIC SURGERY | Facility: CLINIC | Age: 55
End: 2020-08-12
Payer: COMMERCIAL

## 2020-08-12 VITALS
DIASTOLIC BLOOD PRESSURE: 87 MMHG | WEIGHT: 178 LBS | SYSTOLIC BLOOD PRESSURE: 129 MMHG | HEIGHT: 68 IN | BODY MASS INDEX: 26.98 KG/M2 | HEART RATE: 93 BPM

## 2020-08-12 PROCEDURE — 99214 OFFICE O/P EST MOD 30 MIN: CPT

## 2020-08-12 NOTE — END OF VISIT
[FreeTextEntry3] : I, Oleksandr Mayo, acted solely as a scribe for Dr. Tejinder Bowman on this date 08/12/2020.

## 2020-08-12 NOTE — HISTORY OF PRESENT ILLNESS
[Pain Location] : pain [Worsening] : worsening [Intermit.] : ~He/She~ states the symptoms seem to be intermittent [7] : a current pain level of 7/10 [___ yrs] : [unfilled] year(s) ago [Physical Therapy] : relieved by physical therapy [Walking] : worsened by walking [Rest] : relieved by rest [de-identified] : 55 year old female here for evaluation of right hip pain. She is aware that her pervious MRI of her right hip that was taken in November 2019 showed moderate to severe right hip osteoarthritis and that she is a candidate for a right THR. Patent states pain started in September 2019 after starting gym. Patient locates pain to anterior hip and groin. She reports pain is worsening overall. She reports pain is worse with ascending stairs and ambulating. She notes limitations to her activities due to pain. Patient was on Indocin for pain from Rheumatology however stopped due to intolerance. Patient was on Mobic with no relief. Patient was seen in December by Dr. Callahan who administered Intra-articular injection to right with with minimal improvement. patient was seen by Dr. Wang as well and PMR due to back pain. Patient has attempted PT in the past noting improvement. \par \par pt do wake about 2 miles with her dog with causes pain. she is only able to walk a quater mile until she gets pain.\par she note pain relief with ibuprofen. \par she has diagnosed with neuropathy in UE and under the care of neurology. pt is on gabapentin\par she asks if her hip pain is related to neuropathy. \par she failed to responded to guided steroid injection in 11/2019

## 2020-08-12 NOTE — PHYSICAL EXAM
[Antalgic] : antalgic [LE] : Sensory: Intact in bilateral lower extremities [Normal] : Alert and in no acute distress [ALL] : Biceps, brachioradialis, triceps, patellar, ankle and plantar 2+ and symmetric bilaterally [Poor Appearance] : well-appearing [Obese] : not obese [Acute Distress] : not in acute distress [de-identified] : Right hip exam shows able to straight leg raise with pain, positive Stinchfield maneuver, groin pain with external rotation, she has lost internal rotation, no tenderness over the greater trochanteric bursa. \par Left hip exam shows able to straight leg raise without pain. [de-identified] : GENERAL APPEARANCE: Well nourished and hydrated, pleasant, alert, and oriented x 3. Appears their stated age. \par HEENT: Normocephalic, extraocular eye motion intact. Nasal septum midline. Oral cavity clear. External auditory canal clear. \par RESPIRATORY: Breath sounds clear and audible in all lobes. No wheezing, No accessory muscle use.\par CARDIOVASCULAR: No apparent abnormalities. No lower leg edema. No varicosities. Pedal pulses are palpable.\par NEUROLOGIC: Sensation is normal, no muscle weakness in the upper or lower extremities.\par DERMATOLOGIC: No apparent skin lesions, moist, warm, no rash.\par SPINE: Cervical spine appears normal and moves freely; thoracic spine appears normal and moves freely; lumbosacral spine appears normal and moves freely, normal, nontender.\par MUSCULOSKELETAL: Hands, wrists, and elbows are normal and move freely, shoulders are normal and move freely.  [de-identified] : MRI of the right hip obtained 11/21/2019 at [facility] shows bilateral hip joint effusions, moderate on the right, trace on the left. Chronic degenerative tear of the anterosuperior labrum of the right acetabulum, with a 1.1 cm paralabral ganglion cyst. Moderate-to-severe right hip osteoarthritis. 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 See above, correlate clinically.\par \par 3V Xray of the right hip and pelvis done in February 2020 and reviewed by Dr. Tejinder Bowman demonstrates  moderate to early advanced osteoarthritis of the right hip.

## 2020-08-12 NOTE — DISCUSSION/SUMMARY
[Medication Risks Reviewed] : Medication risks reviewed [de-identified] : 56 y/o F with moderate to severe right hip osteoarthritis. Conservative therapy and surgical options discussed in detail with patient. The pt is a future candidate for a right THR. I provided 800mg ibuprofen with counseling. Pt is aware that she can take Gabapentin with ibuprofen when needed. She will continue with activity modification. F/u with us in December.\par \par \par The patient is a 55 year individual with end stage arthritis of their right hip joint. Based upon the patient's continued symptoms and failure to respond to conservative treatment I have recommended a right total hip arthroplasty for this patient. A long discussion took place with the patient describing what a total joint replacement is and what the procedure would entail. A total hip arthroplasty model, similar to the implant that will be used during the operation, was utilized to demonstrate and to discuss the various bearing surfaces of the implants. The hospitalization and post-operative care and rehabilitation were also discussed. The use of perioperative antibiotics and DVT prophylaxis were discussed. The risk, benefits and alternatives to a surgical intervention were discussed at length with the patient. The patient was also advised of risks related to the medical comorbidities, elevated body mass index (BMI), and smoking where applicable. We discussed how to reduce modifiable risk factors and encouraged smoking cessation were applicable.. A lengthy discussion took place to review the most common complications including but not limited to: deep vein thrombosis, pulmonary embolus, heart attack, stroke, infection, wound breakdown, numbness, damage to nerves, tendon, muscles, arteries or other blood vessels, death and other possible complications from anesthesia. The patient was told that we will take steps to minimize these risks by using sterile technique, antibiotics and DVT prophylaxis when appropriate and follow the patient postoperatively in the office setting to monitor progress. The possibility of recurrent pain, no improvement in pain and actual worsening of pain were also discussed with the patient.\par The discharge plan of care focused on the patient going home following surgery. The patient was encouraged to make the necessary arrangements to have someone stay with them when they are discharged home. Following discharge, a home care nurse will visit the patient. The home care nurse will open your home care case and request home physical therapy services. Home physical therapy will commence following discharge provided it is appropriate and covered by the health insurance benefit plan. \par The benefits of surgery were discussed with the patient including the potential for improving her current clinical condition through operative intervention. Alternatives to surgical intervention including continued conservative management were also discussed in detail. All questions were answered to the satisfaction of the patient. The treatment plan of care, as well as a model of a total hip arthroplasty equivalent to the one that will be used for their total joint replacement, was shared with the patient. The patient agreed to the plan of care as well as the use of implants in their total joint replacement.

## 2020-08-29 LAB
APPEARANCE: CLEAR
BILIRUBIN URINE: NEGATIVE
BLOOD URINE: NEGATIVE
CHOLEST SERPL-MCNC: 249 MG/DL
CHOLEST/HDLC SERPL: 3.2 RATIO
COLOR: NORMAL
GLUCOSE QUALITATIVE U: NEGATIVE
HDLC SERPL-MCNC: 78 MG/DL
KETONES URINE: NEGATIVE
LDLC SERPL CALC-MCNC: 152 MG/DL
LEUKOCYTE ESTERASE URINE: NEGATIVE
NITRITE URINE: NEGATIVE
PH URINE: 7
PROTEIN URINE: NEGATIVE
SPECIFIC GRAVITY URINE: 1.01
T4 FREE SERPL-MCNC: 1.2 NG/DL
TRIGL SERPL-MCNC: 96 MG/DL
TSH SERPL-ACNC: 1.93 UIU/ML
UROBILINOGEN URINE: NORMAL

## 2020-08-31 ENCOUNTER — NON-APPOINTMENT (OUTPATIENT)
Age: 55
End: 2020-08-31

## 2020-08-31 ENCOUNTER — APPOINTMENT (OUTPATIENT)
Dept: CARDIOLOGY | Facility: CLINIC | Age: 55
End: 2020-08-31
Payer: COMMERCIAL

## 2020-08-31 VITALS
RESPIRATION RATE: 16 BRPM | HEIGHT: 68 IN | WEIGHT: 178 LBS | DIASTOLIC BLOOD PRESSURE: 88 MMHG | BODY MASS INDEX: 26.98 KG/M2 | HEART RATE: 76 BPM | SYSTOLIC BLOOD PRESSURE: 124 MMHG | OXYGEN SATURATION: 99 %

## 2020-08-31 VITALS — SYSTOLIC BLOOD PRESSURE: 150 MMHG | DIASTOLIC BLOOD PRESSURE: 93 MMHG

## 2020-08-31 PROCEDURE — 93000 ELECTROCARDIOGRAM COMPLETE: CPT

## 2020-08-31 PROCEDURE — 93306 TTE W/DOPPLER COMPLETE: CPT

## 2020-08-31 PROCEDURE — 99204 OFFICE O/P NEW MOD 45 MIN: CPT | Mod: 25

## 2020-08-31 RX ORDER — IBUPROFEN 800 MG/1
800 TABLET, FILM COATED ORAL 3 TIMES DAILY
Qty: 90 | Refills: 1 | Status: DISCONTINUED | COMMUNITY
Start: 2020-08-12 | End: 2020-08-31

## 2020-09-01 NOTE — ASSESSMENT
[FreeTextEntry1] : 55-year-old female with idiopathic peripheral neuropathy possibly from transverse myelitis, positive MADHURI as well as anterior uveitis who is being evaluated for Rituxan infusion.  Cardiac complications have been reported with Rituxan.  Patient is asymptomatic from a cardiac standpoint and until recently was able to exercise without any cardiac symptoms.  Her EKG is normal at rest as well.  Advised her to have an echocardiogram for further evaluation of cardiac function.  If there is no abnormalities she may proceed with Rituxan infusion.  She knows to call me right away with any changes in her symptoms.

## 2020-09-01 NOTE — PHYSICAL EXAM
[Normal Appearance] : normal appearance [Well Groomed] : well groomed [General Appearance - Well Nourished] : well nourished [Normal Conjunctiva] : the conjunctiva exhibited no abnormalities [Normal Oral Mucosa] : normal oral mucosa [Normal Oropharynx] : normal oropharynx [Normal Jugular Venous A Waves Present] : normal jugular venous A waves present [Normal Jugular Venous V Waves Present] : normal jugular venous V waves present [Heart Rate And Rhythm] : heart rate and rhythm were normal [Heart Sounds] : normal S1 and S2 [Arterial Pulses Normal] : the arterial pulses were normal [Respiration, Rhythm And Depth] : normal respiratory rhythm and effort [Bowel Sounds] : normal bowel sounds [Auscultation Breath Sounds / Voice Sounds] : lungs were clear to auscultation bilaterally [Abdomen Tenderness] : non-tender [Abdomen Soft] : soft [Nail Clubbing] : no clubbing of the fingernails [Abnormal Walk] : normal gait [Skin Color & Pigmentation] : normal skin color and pigmentation [Cyanosis, Localized] : no localized cyanosis [Skin Turgor] : normal skin turgor [No Venous Stasis] : no venous stasis [Oriented To Time, Place, And Person] : oriented to person, place, and time [Impaired Insight] : insight and judgment were intact [Affect] : the affect was normal [FreeTextEntry1] : 3/6 becky mendez

## 2020-09-01 NOTE — HISTORY OF PRESENT ILLNESS
[FreeTextEntry1] : 55-year-old female who is here for evaluation prior to starting Rituxan.\par Patient has been complaining of tingling and weakness of the upper extremities as well as tingling of the chest wall and back and a slight weakness in the lower extremities.  She had a spinal tap which was negative for MS.  She is also has uveitis and has seen a rheumatologist.  Despite having positive MADHURI there is no concrete rheumatologic disorder at this time.  Patient does have osteoarthritis of the right hip.\par She has tried multiple courses of her steroids and is now diagnosed with transverse myelitis.  She denies having any chest pain or shortness of breath.  Prior to few months ago she was able to exercise without any chest pain or shortness of breath.

## 2020-09-01 NOTE — REVIEW OF SYSTEMS
[Joint Pain] : joint pain [Muscle Cramps] : muscle cramps [Headache] : no headache [Recent Weight Gain (___ Lbs)] : no recent weight gain [Chills] : no chills [Feeling Fatigued] : not feeling fatigued [Recent Weight Loss (___ Lbs)] : no recent weight loss [Blurry Vision] : no blurred vision [Eyeglasses] : not currently wearing eyeglasses [Discharge From The Ears] : no discharge from the ears [Sore Throat] : no sore throat [Dyspnea on exertion] : not dyspnea during exertion [Chest Pain] : no chest pain [Lower Ext Edema] : no extremity edema [Palpitations] : no palpitations [Cough] : no cough [Wheezing] : no wheezing [Abdominal Pain] : no abdominal pain [Nausea] : no nausea [Heartburn] : no heartburn [Dysuria] : no dysuria [Incontinence] : no incontinence [Pelvic Pain] : no pelvic pain [Skin: A Rash] : no rash: [Skin Lesions] : no skin lesions [Dizziness] : no dizziness [Tremor] : no tremor was seen [Convulsions] : no convulsions [Confusion] : no confusion was observed [Memory Lapses Or Loss] : no memory lapses or loss [Anxiety] : no anxiety [Under Stress] : not under stress [Excessive Thirst] : no polydipsia [Easy Bleeding] : no tendency for easy bleeding [Easy Bruising] : no tendency for easy bruising

## 2020-09-01 NOTE — PHYSICAL EXAM
[Normal Appearance] : normal appearance [Well Groomed] : well groomed [General Appearance - Well Nourished] : well nourished [Normal Oral Mucosa] : normal oral mucosa [Normal Conjunctiva] : the conjunctiva exhibited no abnormalities [Normal Jugular Venous A Waves Present] : normal jugular venous A waves present [Normal Oropharynx] : normal oropharynx [Normal Jugular Venous V Waves Present] : normal jugular venous V waves present [Heart Rate And Rhythm] : heart rate and rhythm were normal [Heart Sounds] : normal S1 and S2 [Arterial Pulses Normal] : the arterial pulses were normal [Respiration, Rhythm And Depth] : normal respiratory rhythm and effort [Auscultation Breath Sounds / Voice Sounds] : lungs were clear to auscultation bilaterally [Bowel Sounds] : normal bowel sounds [Abdomen Soft] : soft [Abdomen Tenderness] : non-tender [Nail Clubbing] : no clubbing of the fingernails [Abnormal Walk] : normal gait [Skin Color & Pigmentation] : normal skin color and pigmentation [Cyanosis, Localized] : no localized cyanosis [No Venous Stasis] : no venous stasis [Skin Turgor] : normal skin turgor [Impaired Insight] : insight and judgment were intact [Oriented To Time, Place, And Person] : oriented to person, place, and time [Affect] : the affect was normal [FreeTextEntry1] : 3/6 becky mendez

## 2020-09-01 NOTE — PHYSICAL EXAM
[Normal Appearance] : normal appearance [Well Groomed] : well groomed [General Appearance - Well Nourished] : well nourished [Normal Conjunctiva] : the conjunctiva exhibited no abnormalities [Normal Oral Mucosa] : normal oral mucosa [Normal Oropharynx] : normal oropharynx [Normal Jugular Venous A Waves Present] : normal jugular venous A waves present [Normal Jugular Venous V Waves Present] : normal jugular venous V waves present [Heart Rate And Rhythm] : heart rate and rhythm were normal [Heart Sounds] : normal S1 and S2 [Arterial Pulses Normal] : the arterial pulses were normal [Respiration, Rhythm And Depth] : normal respiratory rhythm and effort [Bowel Sounds] : normal bowel sounds [Auscultation Breath Sounds / Voice Sounds] : lungs were clear to auscultation bilaterally [Abdomen Tenderness] : non-tender [Abdomen Soft] : soft [Abnormal Walk] : normal gait [Nail Clubbing] : no clubbing of the fingernails [Cyanosis, Localized] : no localized cyanosis [Skin Color & Pigmentation] : normal skin color and pigmentation [Skin Turgor] : normal skin turgor [No Venous Stasis] : no venous stasis [Impaired Insight] : insight and judgment were intact [Oriented To Time, Place, And Person] : oriented to person, place, and time [Affect] : the affect was normal [FreeTextEntry1] : 3/6 becky mendez

## 2020-09-01 NOTE — ADDENDUM
[FreeTextEntry1] : Echocardiogram was performed on 09/01/2020 demonstrating normal LV function, LVEF of 62%, normal right sided chambers with mild mitral valve regurgitation. \par \par Rachel is stable from our standpoint to proceed with Rituxan infusion. \par

## 2020-09-10 ENCOUNTER — APPOINTMENT (OUTPATIENT)
Dept: BREAST CENTER | Facility: CLINIC | Age: 55
End: 2020-09-10
Payer: COMMERCIAL

## 2020-09-10 ENCOUNTER — LABORATORY RESULT (OUTPATIENT)
Age: 55
End: 2020-09-10

## 2020-09-10 VITALS
HEART RATE: 82 BPM | SYSTOLIC BLOOD PRESSURE: 132 MMHG | DIASTOLIC BLOOD PRESSURE: 83 MMHG | WEIGHT: 175 LBS | BODY MASS INDEX: 26.52 KG/M2 | HEIGHT: 68 IN

## 2020-09-10 DIAGNOSIS — Z56.0 UNEMPLOYMENT, UNSPECIFIED: ICD-10-CM

## 2020-09-10 DIAGNOSIS — Z80.8 FAMILY HISTORY OF MALIGNANT NEOPLASM OF OTHER ORGANS OR SYSTEMS: ICD-10-CM

## 2020-09-10 PROCEDURE — 99244 OFF/OP CNSLTJ NEW/EST MOD 40: CPT

## 2020-09-10 SDOH — ECONOMIC STABILITY - INCOME SECURITY: UNEMPLOYMENT, UNSPECIFIED: Z56.0

## 2020-09-10 NOTE — HISTORY OF PRESENT ILLNESS
[FreeTextEntry1] : This is a 55 year old  postmenopausal female who has noticed clear discharge on her nightgown, possibly from both breasts, for about 3 months. In the last few days, she noticed brown discharge for her right nipple.\par \par She had nipple discharge a number of years ago and had an evaluation for it, but it resolved.\par \par She is currently undergoing therapy for uveitis/transverse myelitis and was due to start Rituxan.  She has been on gabapentin since April and had a 5 day course of IV steroids in May.

## 2020-09-10 NOTE — DATA REVIEWED
[FreeTextEntry1] : Bilateral mammogram 7/15/2020:  No mammographic evidence of malignancy right.  Questionable calcifications left central inner posterior, rec additional imaging.\par \par Bilateral breast ultrasound 7/20/2020:  No sonographic evidence of malignancy.  Benign appearing left duct ectasia as seen previously.\par \par Left diagnostic mammogram 7/20/2020:  Benign findings.  A few scattered round calcifications.\par \par (Images reviewed on the [employer] portal. The disc will be sent to [facility].)

## 2020-09-10 NOTE — PAST MEDICAL HISTORY
[Menopause Age____] : age at menopause was [unfilled] [Menarche Age ____] : age at menarche was [unfilled] [History of Hormone Replacement Treatment] : has no history of hormone replacement treatment [Total Preg ___] : G[unfilled] [Live Births ___] : P[unfilled]  [Age At Live Birth ___] : Age at live birth: [unfilled] [FreeTextEntry2] : 2 daughters [FreeTextEntry7] : x 3 years [FreeTextEntry8] : x 3 years each

## 2020-09-10 NOTE — PHYSICAL EXAM
[EOMI] : extra ocular movement intact [Sclera nonicteric] : sclera nonicteric [No Supraclavicular Adenopathy] : no supraclavicular adenopathy [Supple] : supple [No Cervical Adenopathy] : no cervical adenopathy [No Thyromegaly] : no thyromegaly [Clear to Auscultation Bilat] : clear to auscultation bilaterally [Normal Sinus Rhythm] : normal sinus rhythm [Examined in the supine and seated position] : examined in the supine and seated position [Normal S1, S2] : normal S1 and S2 [No dominant masses] : no dominant masses in right breast  [No dominant masses] : no dominant masses left breast [No Nipple Retraction] : no left nipple retraction [No Nipple Discharge] : no left nipple discharge [Soft] : abdomen soft [No Axillary Lymphadenopathy] : no left axillary lymphadenopathy [de-identified] : Brown/bloody discharge from single duct 4:00, Guaiac positive, cytology sent. [Not Tender] : non-tender [No Palpable Masses] : no abdominal mass palpated

## 2020-09-10 NOTE — CONSULT LETTER
[Dear  ___] : Dear  [unfilled], [Sincerely,] : Sincerely, [Consult Letter:] : I had the pleasure of evaluating your patient, [unfilled]. [DrShola  ___] : Dr. FALK

## 2020-09-17 ENCOUNTER — APPOINTMENT (OUTPATIENT)
Dept: MRI IMAGING | Facility: CLINIC | Age: 55
End: 2020-09-17

## 2020-09-24 ENCOUNTER — APPOINTMENT (OUTPATIENT)
Dept: MRI IMAGING | Facility: IMAGING CENTER | Age: 55
End: 2020-09-24
Payer: COMMERCIAL

## 2020-09-24 ENCOUNTER — RESULT REVIEW (OUTPATIENT)
Age: 55
End: 2020-09-24

## 2020-09-24 ENCOUNTER — OUTPATIENT (OUTPATIENT)
Dept: OUTPATIENT SERVICES | Facility: HOSPITAL | Age: 55
LOS: 1 days | End: 2020-09-24
Payer: COMMERCIAL

## 2020-09-24 DIAGNOSIS — Z00.8 ENCOUNTER FOR OTHER GENERAL EXAMINATION: ICD-10-CM

## 2020-09-24 PROCEDURE — 19085 BX BREAST 1ST LESION MR IMAG: CPT

## 2020-09-24 PROCEDURE — 88305 TISSUE EXAM BY PATHOLOGIST: CPT

## 2020-09-24 PROCEDURE — 88305 TISSUE EXAM BY PATHOLOGIST: CPT | Mod: 26

## 2020-09-24 PROCEDURE — 77065 DX MAMMO INCL CAD UNI: CPT | Mod: 26,RT

## 2020-09-24 PROCEDURE — A9585: CPT

## 2020-09-24 PROCEDURE — 77065 DX MAMMO INCL CAD UNI: CPT

## 2020-09-24 PROCEDURE — 19085 BX BREAST 1ST LESION MR IMAG: CPT | Mod: RT

## 2020-09-25 LAB — SURGICAL PATHOLOGY STUDY: SIGNIFICANT CHANGE UP

## 2020-10-02 ENCOUNTER — RESULT REVIEW (OUTPATIENT)
Age: 55
End: 2020-10-02

## 2020-10-02 ENCOUNTER — OUTPATIENT (OUTPATIENT)
Dept: OUTPATIENT SERVICES | Facility: HOSPITAL | Age: 55
LOS: 1 days | End: 2020-10-02
Payer: COMMERCIAL

## 2020-10-02 DIAGNOSIS — N64.52 NIPPLE DISCHARGE: ICD-10-CM

## 2020-10-02 PROCEDURE — 88321 CONSLTJ&REPRT SLD PREP ELSWR: CPT

## 2020-10-03 DIAGNOSIS — N64.52 NIPPLE DISCHARGE: ICD-10-CM

## 2020-10-09 ENCOUNTER — RESULT REVIEW (OUTPATIENT)
Age: 55
End: 2020-10-09

## 2020-10-09 ENCOUNTER — OUTPATIENT (OUTPATIENT)
Dept: OUTPATIENT SERVICES | Facility: HOSPITAL | Age: 55
LOS: 1 days | End: 2020-10-09
Payer: COMMERCIAL

## 2020-10-09 ENCOUNTER — APPOINTMENT (OUTPATIENT)
Dept: MAMMOGRAPHY | Facility: CLINIC | Age: 55
End: 2020-10-09
Payer: COMMERCIAL

## 2020-10-09 DIAGNOSIS — Z01.818 ENCOUNTER FOR OTHER PREPROCEDURAL EXAMINATION: ICD-10-CM

## 2020-10-09 DIAGNOSIS — Z98.890 OTHER SPECIFIED POSTPROCEDURAL STATES: Chronic | ICD-10-CM

## 2020-10-09 DIAGNOSIS — D24.1 BENIGN NEOPLASM OF RIGHT BREAST: ICD-10-CM

## 2020-10-09 DIAGNOSIS — N64.52 NIPPLE DISCHARGE: ICD-10-CM

## 2020-10-09 DIAGNOSIS — Z00.8 ENCOUNTER FOR OTHER GENERAL EXAMINATION: ICD-10-CM

## 2020-10-09 LAB
ALBUMIN SERPL ELPH-MCNC: 3.7 G/DL — SIGNIFICANT CHANGE UP (ref 3.3–5)
ALP SERPL-CCNC: 90 U/L — SIGNIFICANT CHANGE UP (ref 40–120)
ALT FLD-CCNC: 33 U/L — SIGNIFICANT CHANGE UP (ref 12–78)
ANION GAP SERPL CALC-SCNC: 3 MMOL/L — LOW (ref 5–17)
APTT BLD: 32.9 SEC — SIGNIFICANT CHANGE UP (ref 27.5–35.5)
AST SERPL-CCNC: 25 U/L — SIGNIFICANT CHANGE UP (ref 15–37)
BASOPHILS # BLD AUTO: 0.02 K/UL — SIGNIFICANT CHANGE UP (ref 0–0.2)
BASOPHILS NFR BLD AUTO: 0.3 % — SIGNIFICANT CHANGE UP (ref 0–2)
BILIRUB SERPL-MCNC: 0.4 MG/DL — SIGNIFICANT CHANGE UP (ref 0.2–1.2)
BUN SERPL-MCNC: 17 MG/DL — SIGNIFICANT CHANGE UP (ref 7–23)
CALCIUM SERPL-MCNC: 9.4 MG/DL — SIGNIFICANT CHANGE UP (ref 8.5–10.1)
CHLORIDE SERPL-SCNC: 107 MMOL/L — SIGNIFICANT CHANGE UP (ref 96–108)
CO2 SERPL-SCNC: 30 MMOL/L — SIGNIFICANT CHANGE UP (ref 22–31)
CREAT SERPL-MCNC: 0.78 MG/DL — SIGNIFICANT CHANGE UP (ref 0.5–1.3)
EOSINOPHIL # BLD AUTO: 0.1 K/UL — SIGNIFICANT CHANGE UP (ref 0–0.5)
EOSINOPHIL NFR BLD AUTO: 1.6 % — SIGNIFICANT CHANGE UP (ref 0–6)
GLUCOSE SERPL-MCNC: 85 MG/DL — SIGNIFICANT CHANGE UP (ref 70–99)
HCT VFR BLD CALC: 42 % — SIGNIFICANT CHANGE UP (ref 34.5–45)
HGB BLD-MCNC: 13.8 G/DL — SIGNIFICANT CHANGE UP (ref 11.5–15.5)
IMM GRANULOCYTES NFR BLD AUTO: 0.3 % — SIGNIFICANT CHANGE UP (ref 0–1.5)
INR BLD: 1.02 RATIO — SIGNIFICANT CHANGE UP (ref 0.88–1.16)
LYMPHOCYTES # BLD AUTO: 2.58 K/UL — SIGNIFICANT CHANGE UP (ref 1–3.3)
LYMPHOCYTES # BLD AUTO: 42.4 % — SIGNIFICANT CHANGE UP (ref 13–44)
MCHC RBC-ENTMCNC: 30.3 PG — SIGNIFICANT CHANGE UP (ref 27–34)
MCHC RBC-ENTMCNC: 32.9 GM/DL — SIGNIFICANT CHANGE UP (ref 32–36)
MCV RBC AUTO: 92.1 FL — SIGNIFICANT CHANGE UP (ref 80–100)
MONOCYTES # BLD AUTO: 0.43 K/UL — SIGNIFICANT CHANGE UP (ref 0–0.9)
MONOCYTES NFR BLD AUTO: 7.1 % — SIGNIFICANT CHANGE UP (ref 2–14)
NEUTROPHILS # BLD AUTO: 2.93 K/UL — SIGNIFICANT CHANGE UP (ref 1.8–7.4)
NEUTROPHILS NFR BLD AUTO: 48.3 % — SIGNIFICANT CHANGE UP (ref 43–77)
PLATELET # BLD AUTO: 238 K/UL — SIGNIFICANT CHANGE UP (ref 150–400)
POTASSIUM SERPL-MCNC: 4.4 MMOL/L — SIGNIFICANT CHANGE UP (ref 3.5–5.3)
POTASSIUM SERPL-SCNC: 4.4 MMOL/L — SIGNIFICANT CHANGE UP (ref 3.5–5.3)
PROT SERPL-MCNC: 7.2 GM/DL — SIGNIFICANT CHANGE UP (ref 6–8.3)
PROTHROM AB SERPL-ACNC: 11.8 SEC — SIGNIFICANT CHANGE UP (ref 10.6–13.6)
RBC # BLD: 4.56 M/UL — SIGNIFICANT CHANGE UP (ref 3.8–5.2)
RBC # FLD: 12.9 % — SIGNIFICANT CHANGE UP (ref 10.3–14.5)
SODIUM SERPL-SCNC: 140 MMOL/L — SIGNIFICANT CHANGE UP (ref 135–145)
WBC # BLD: 6.08 K/UL — SIGNIFICANT CHANGE UP (ref 3.8–10.5)
WBC # FLD AUTO: 6.08 K/UL — SIGNIFICANT CHANGE UP (ref 3.8–10.5)

## 2020-10-09 PROCEDURE — 19281 PERQ DEVICE BREAST 1ST IMAG: CPT

## 2020-10-09 PROCEDURE — 19281 PERQ DEVICE BREAST 1ST IMAG: CPT | Mod: RT

## 2020-10-09 PROCEDURE — 86850 RBC ANTIBODY SCREEN: CPT

## 2020-10-09 PROCEDURE — 71046 X-RAY EXAM CHEST 2 VIEWS: CPT | Mod: 26

## 2020-10-09 PROCEDURE — 85730 THROMBOPLASTIN TIME PARTIAL: CPT

## 2020-10-09 PROCEDURE — 93005 ELECTROCARDIOGRAM TRACING: CPT

## 2020-10-09 PROCEDURE — 86901 BLOOD TYPING SEROLOGIC RH(D): CPT

## 2020-10-09 PROCEDURE — 85025 COMPLETE CBC W/AUTO DIFF WBC: CPT

## 2020-10-09 PROCEDURE — 80053 COMPREHEN METABOLIC PANEL: CPT

## 2020-10-09 PROCEDURE — 93010 ELECTROCARDIOGRAM REPORT: CPT

## 2020-10-09 PROCEDURE — C1739: CPT

## 2020-10-09 PROCEDURE — 85610 PROTHROMBIN TIME: CPT

## 2020-10-09 PROCEDURE — 86900 BLOOD TYPING SEROLOGIC ABO: CPT

## 2020-10-09 PROCEDURE — 36415 COLL VENOUS BLD VENIPUNCTURE: CPT

## 2020-10-09 PROCEDURE — 71046 X-RAY EXAM CHEST 2 VIEWS: CPT

## 2020-10-09 NOTE — ASU PATIENT PROFILE, ADULT - PMH
Intraductal papillary adenocarcinoma, right    Osteoarthritis    Spondylosis  lumbar  Transverse myelitis    Uveitis  bilateral eye L>R   MADHURI positive    Intraductal papillary adenocarcinoma, right    Osteoarthritis    Paresthesias    Peripheral neuropathy    Spondylosis  lumbar  Transverse myelitis    Uveitis  bilateral eye L>R

## 2020-10-09 NOTE — CHART NOTE - NSCHARTNOTEFT_GEN_A_CORE
55 year old female presents to PST for planned yenifer  right breast biopsy with ductal excision     Plan:  1. PST instructions given ; NPO status instructions to be given by ASU   2. Pt instructed to take following meds with sip of water : combigan   3. Pt instructed to take routine evening medications unless indicated   4. Stop NSAIDS ( Aspirin Alev Motrin Mobic Diclofenac), herbal supplements , MVI , Vitamin fish oil 7 days prior to surgery  unless   directed by surgeon or cardiologist;   5. Medical Optimization  with Dr Theresa Paige   6. EZ wash instructions given   7. Labs EKG CXR as per surgeon request   8. Pt instructed to self quarantine   9. Covid Testing scheduled Pt notified and aware  10. Pt denies covid symptoms shortness of breath fever cough

## 2020-10-10 DIAGNOSIS — Z01.818 ENCOUNTER FOR OTHER PREPROCEDURAL EXAMINATION: ICD-10-CM

## 2020-10-10 DIAGNOSIS — N64.52 NIPPLE DISCHARGE: ICD-10-CM

## 2020-10-12 ENCOUNTER — APPOINTMENT (OUTPATIENT)
Dept: FAMILY MEDICINE | Facility: CLINIC | Age: 55
End: 2020-10-12
Payer: COMMERCIAL

## 2020-10-12 VITALS
DIASTOLIC BLOOD PRESSURE: 80 MMHG | HEART RATE: 102 BPM | BODY MASS INDEX: 27.13 KG/M2 | SYSTOLIC BLOOD PRESSURE: 128 MMHG | HEIGHT: 68 IN | WEIGHT: 179 LBS

## 2020-10-12 DIAGNOSIS — Z20.828 CONTACT WITH AND (SUSPECTED) EXPOSURE TO OTHER VIRAL COMMUNICABLE DISEASES: ICD-10-CM

## 2020-10-12 PROCEDURE — 99215 OFFICE O/P EST HI 40 MIN: CPT

## 2020-10-13 PROBLEM — Z20.828 SUSPECTED COVID-19 VIRUS INFECTION: Status: RESOLVED | Noted: 2020-04-14 | Resolved: 2020-10-13

## 2020-10-13 NOTE — PLAN
[FreeTextEntry1] : OPTIMIZED MEDICALLY FOR PROPOSED SURGICAL PROCEDURE \par PRE-OPERATIVE TESTING REVIEWED \par GOOD EXERCISE CAPACITY\par GI/DVT PROPHYLAXIS PER SURGERY\par AVOIDANCE OF NSAIDS, VITAMINS AND ASPIRIN CONTAINING PRODUCTS PRIOR TO SURGERY\par WILL OBTAIN CLEARANCE AND ANY RECOMMENDATIONS FROM NEUROLOGY\par CALL WITH ANY QUESTIONS, CONCERNS OR CHANGES PRIOR TO PROCEDURE\par SUPPORT PROVIDED\par FOLLOW-UP POST-OPERATIVELY \par DISCUSSED FLU VACCINE

## 2020-10-13 NOTE — HISTORY OF PRESENT ILLNESS
[No Pertinent Cardiac History] : no history of aortic stenosis, atrial fibrillation, coronary artery disease, recent myocardial infarction, or implantable device/pacemaker [No Pertinent Pulmonary History] : no history of asthma, COPD, sleep apnea, or smoking [No Adverse Anesthesia Reaction] : no adverse anesthesia reaction in self or family member [(Patient denies any chest pain, claudication, dyspnea on exertion, orthopnea, palpitations or syncope)] : Patient denies any chest pain, claudication, dyspnea on exertion, orthopnea, palpitations or syncope [Chronic Anticoagulation] : no chronic anticoagulation [Chronic Kidney Disease] : no chronic kidney disease [Diabetes] : no diabetes [FreeTextEntry1] : RIGHT SAVISCOUT LOCALIZED SURGICAL EXCISION WITH DUCT EXCISION [FreeTextEntry2] : OCTOBER 19, 2020 [FreeTextEntry3] : DR. FRANCIE ROBERTS [FreeTextEntry4] : PRESENTING FOR MEDICAL CLEARANCE FOR UPCOMING BREAST SURGERY FOR AN INTRADUCTAL PAPILLOMA OF RIGHT BREAST.  FEELING WELL TODAY.  NO HISTORY OF MI, STROKE OR SEIZURE.  NO PERSONAL OR FAMILY HISTORY OF BLOOD DISORDERS.  DENIES EASY BLEEDING OR BRUISING.  IS ABLE TO WALK MULTIPLE BLOCKS AND GO UP MULTIPLE FLIGHTS OF STAIRS WITHOUT DIFFICULTY.   HAS HAD NO HEADACHE, DIZZINESS, CHEST PAIN, SHORTNESS OF BREATH, GI OR URINARY COMPLAINTS.  ONLY COMPLAINT IS NEUROPATHY FROM TRANSVERSE MYELITIS UNDER THE CARE OF NEUROLOGY.  NO OTHER COMPLAINTS TODAY.  [FreeTextEntry7] : RECENT CARDIOLOGY EVALUATION WITH ECHOCARDIOGRAM IN SEPTEMBER 2020 - DR. FUCHS

## 2020-10-13 NOTE — REVIEW OF SYSTEMS
[Negative] : Heme/Lymph [Headache] : no headache [Dizziness] : no dizziness [Fainting] : no fainting [de-identified] : NEUROPATHY

## 2020-10-13 NOTE — PHYSICAL EXAM
[No Acute Distress] : no acute distress [Well Nourished] : well nourished [Well-Appearing] : well-appearing [Normal Sclera/Conjunctiva] : normal sclera/conjunctiva [PERRL] : pupils equal round and reactive to light [Normal Outer Ear/Nose] : the outer ears and nose were normal in appearance [Normal Oropharynx] : the oropharynx was normal [Normal TMs] : both tympanic membranes were normal [No Lymphadenopathy] : no lymphadenopathy [Supple] : supple [No Respiratory Distress] : no respiratory distress  [No Accessory Muscle Use] : no accessory muscle use [Clear to Auscultation] : lungs were clear to auscultation bilaterally [Normal Rate] : normal rate  [Regular Rhythm] : with a regular rhythm [Normal S1, S2] : normal S1 and S2 [Soft] : abdomen soft [Non Tender] : non-tender [Normal Bowel Sounds] : normal bowel sounds [No Joint Swelling] : no joint swelling [No Rash] : no rash [Coordination Grossly Intact] : coordination grossly intact [No Focal Deficits] : no focal deficits [Normal Gait] : normal gait [Speech Grossly Normal] : speech grossly normal [Normal Affect] : the affect was normal [Normal Mood] : the mood was normal [de-identified] : DTR'S DIMINISHED

## 2020-10-16 ENCOUNTER — OUTPATIENT (OUTPATIENT)
Dept: OUTPATIENT SERVICES | Facility: HOSPITAL | Age: 55
LOS: 1 days | End: 2020-10-16
Payer: COMMERCIAL

## 2020-10-16 DIAGNOSIS — Z98.890 OTHER SPECIFIED POSTPROCEDURAL STATES: Chronic | ICD-10-CM

## 2020-10-16 DIAGNOSIS — Z11.59 ENCOUNTER FOR SCREENING FOR OTHER VIRAL DISEASES: ICD-10-CM

## 2020-10-16 LAB — SARS-COV-2 RNA SPEC QL NAA+PROBE: SIGNIFICANT CHANGE UP

## 2020-10-16 PROCEDURE — U0003: CPT

## 2020-10-17 DIAGNOSIS — Z11.59 ENCOUNTER FOR SCREENING FOR OTHER VIRAL DISEASES: ICD-10-CM

## 2020-10-19 ENCOUNTER — OUTPATIENT (OUTPATIENT)
Dept: INPATIENT UNIT | Facility: HOSPITAL | Age: 55
LOS: 1 days | Discharge: HOME | End: 2020-10-19
Payer: COMMERCIAL

## 2020-10-19 ENCOUNTER — APPOINTMENT (OUTPATIENT)
Dept: BREAST CENTER | Facility: HOSPITAL | Age: 55
End: 2020-10-19

## 2020-10-19 VITALS
OXYGEN SATURATION: 100 % | WEIGHT: 175.05 LBS | HEIGHT: 63 IN | TEMPERATURE: 98 F | DIASTOLIC BLOOD PRESSURE: 89 MMHG | RESPIRATION RATE: 16 BRPM | SYSTOLIC BLOOD PRESSURE: 128 MMHG | HEART RATE: 79 BPM

## 2020-10-19 VITALS
HEART RATE: 67 BPM | SYSTOLIC BLOOD PRESSURE: 125 MMHG | DIASTOLIC BLOOD PRESSURE: 84 MMHG | TEMPERATURE: 97 F | OXYGEN SATURATION: 99 % | RESPIRATION RATE: 14 BRPM

## 2020-10-19 DIAGNOSIS — Z98.890 OTHER SPECIFIED POSTPROCEDURAL STATES: Chronic | ICD-10-CM

## 2020-10-19 DIAGNOSIS — N64.52 NIPPLE DISCHARGE: ICD-10-CM

## 2020-10-19 PROCEDURE — 19110 NIPPLE EXPLORATION: CPT | Mod: 59

## 2020-10-19 PROCEDURE — 88307 TISSUE EXAM BY PATHOLOGIST: CPT

## 2020-10-19 PROCEDURE — 19125 EXCISION BREAST LESION: CPT | Mod: RT

## 2020-10-19 PROCEDURE — 76098 X-RAY EXAM SURGICAL SPECIMEN: CPT

## 2020-10-19 RX ORDER — ACETAMINOPHEN 500 MG
1000 TABLET ORAL ONCE
Refills: 0 | Status: COMPLETED | OUTPATIENT
Start: 2020-10-19 | End: 2020-10-19

## 2020-10-19 RX ORDER — FENTANYL CITRATE 50 UG/ML
50 INJECTION INTRAVENOUS
Refills: 0 | Status: DISCONTINUED | OUTPATIENT
Start: 2020-10-19 | End: 2020-10-19

## 2020-10-19 RX ORDER — PREDNISOLONE SODIUM PHOSPHATE 1 %
0 DROPS OPHTHALMIC (EYE)
Qty: 0 | Refills: 0 | DISCHARGE

## 2020-10-19 RX ORDER — ONDANSETRON 8 MG/1
4 TABLET, FILM COATED ORAL ONCE
Refills: 0 | Status: DISCONTINUED | OUTPATIENT
Start: 2020-10-19 | End: 2020-10-19

## 2020-10-19 RX ORDER — BRIMONIDINE TARTRATE, TIMOLOL MALEATE 2; 5 MG/ML; MG/ML
1 SOLUTION/ DROPS OPHTHALMIC
Qty: 0 | Refills: 0 | DISCHARGE

## 2020-10-19 RX ORDER — OXYCODONE HYDROCHLORIDE 5 MG/1
10 TABLET ORAL ONCE
Refills: 0 | Status: DISCONTINUED | OUTPATIENT
Start: 2020-10-19 | End: 2020-10-19

## 2020-10-19 RX ORDER — OMEGA-3 ACID ETHYL ESTERS 1 G
0 CAPSULE ORAL
Qty: 0 | Refills: 0 | DISCHARGE

## 2020-10-19 RX ORDER — FAMOTIDINE 10 MG/ML
20 INJECTION INTRAVENOUS ONCE
Refills: 0 | Status: COMPLETED | OUTPATIENT
Start: 2020-10-19 | End: 2020-10-19

## 2020-10-19 RX ORDER — SODIUM CHLORIDE 9 MG/ML
1000 INJECTION, SOLUTION INTRAVENOUS
Refills: 0 | Status: DISCONTINUED | OUTPATIENT
Start: 2020-10-19 | End: 2020-10-19

## 2020-10-19 RX ORDER — PREGABALIN 225 MG/1
0 CAPSULE ORAL
Qty: 0 | Refills: 0 | DISCHARGE

## 2020-10-19 RX ORDER — ACETAMINOPHEN 500 MG
975 TABLET ORAL ONCE
Refills: 0 | Status: COMPLETED | OUTPATIENT
Start: 2020-10-19 | End: 2020-10-19

## 2020-10-19 RX ORDER — GABAPENTIN 400 MG/1
1 CAPSULE ORAL
Qty: 0 | Refills: 0 | DISCHARGE

## 2020-10-19 RX ADMIN — Medication 975 MILLIGRAM(S): at 13:04

## 2020-10-19 RX ADMIN — FAMOTIDINE 20 MILLIGRAM(S): 10 INJECTION INTRAVENOUS at 13:03

## 2020-10-19 RX ADMIN — Medication 400 MILLIGRAM(S): at 17:51

## 2020-10-19 RX ADMIN — Medication 1000 MILLIGRAM(S): at 17:51

## 2020-10-19 NOTE — ASU DISCHARGE PLAN (ADULT/PEDIATRIC) - C. MAKE IMPORTANT PERSONAL OR BUSINESS DECISIONS
Impression: Other secondary cataract, bilateral: H26.493. Plan: Discussed diagnosis in detail with patient. No treatment is required at this time. Will continue to observe condition and or symptoms.
Impression: Presence of intraocular lens: Z96.1. Plan: IOL(s) positioned well.  Monitor with yearly dilated eye exam.
Impression: Vitreous degeneration, bilateral: H43.813. Plan: There is no evidence of retinal pathology. All signs, symptoms, and risks of retinal detachment and tears were discussed in detail. Patient instructed to call the office immediately if any symptoms noted. Recommend the patient return to office yearly for follow up.
Statement Selected

## 2020-10-19 NOTE — ASU PATIENT PROFILE, ADULT - PMH
MADHURI positive    Intraductal papillary adenocarcinoma, right    Osteoarthritis    Paresthesias    Peripheral neuropathy    Spondylosis  lumbar  Transverse myelitis    Uveitis  bilateral eye L>R

## 2020-10-19 NOTE — BRIEF OPERATIVE NOTE - NSICDXBRIEFPOSTOP_GEN_ALL_CORE_FT
POST-OP DIAGNOSIS:  Bloody discharge from nipple 19-Oct-2020 17:38:44  Adriana Montoya  Papilloma of right breast 19-Oct-2020 17:38:26  Adriana Montoya

## 2020-10-19 NOTE — BRIEF OPERATIVE NOTE - NSICDXBRIEFPROCEDURE_GEN_ALL_CORE_FT
PROCEDURES:  Excision, lesion identified by x-ray marker, breast 19-Oct-2020 17:37:23  Adriana Montoya

## 2020-10-19 NOTE — ASU DISCHARGE PLAN (ADULT/PEDIATRIC) - CARE PROVIDER_API CALL
Adriana Ochoa  SURGERY  [address] [place] [address]  Phone: [phone]  Fax: [phone]  Follow Up Time:

## 2020-10-20 ENCOUNTER — RESULT REVIEW (OUTPATIENT)
Age: 55
End: 2020-10-20

## 2020-10-20 PROCEDURE — 76098 X-RAY EXAM SURGICAL SPECIMEN: CPT | Mod: 26

## 2020-10-20 PROCEDURE — 88307 TISSUE EXAM BY PATHOLOGIST: CPT | Mod: 26

## 2020-10-21 ENCOUNTER — NON-APPOINTMENT (OUTPATIENT)
Age: 55
End: 2020-10-21

## 2020-10-22 DIAGNOSIS — N60.21 FIBROADENOSIS OF RIGHT BREAST: ICD-10-CM

## 2020-10-22 DIAGNOSIS — N64.52 NIPPLE DISCHARGE: ICD-10-CM

## 2020-10-22 DIAGNOSIS — N60.31 FIBROSCLEROSIS OF RIGHT BREAST: ICD-10-CM

## 2020-10-22 DIAGNOSIS — M16.11 UNILATERAL PRIMARY OSTEOARTHRITIS, RIGHT HIP: ICD-10-CM

## 2020-10-22 DIAGNOSIS — D24.1 BENIGN NEOPLASM OF RIGHT BREAST: ICD-10-CM

## 2020-10-22 DIAGNOSIS — G60.9 HEREDITARY AND IDIOPATHIC NEUROPATHY, UNSPECIFIED: ICD-10-CM

## 2020-10-22 DIAGNOSIS — G37.3 ACUTE TRANSVERSE MYELITIS IN DEMYELINATING DISEASE OF CENTRAL NERVOUS SYSTEM: ICD-10-CM

## 2020-10-22 DIAGNOSIS — H20.9 UNSPECIFIED IRIDOCYCLITIS: ICD-10-CM

## 2020-10-26 ENCOUNTER — APPOINTMENT (OUTPATIENT)
Dept: BREAST CENTER | Facility: CLINIC | Age: 55
End: 2020-10-26
Payer: COMMERCIAL

## 2020-10-26 VITALS
BODY MASS INDEX: 26.98 KG/M2 | HEIGHT: 68 IN | HEART RATE: 73 BPM | WEIGHT: 178 LBS | DIASTOLIC BLOOD PRESSURE: 81 MMHG | SYSTOLIC BLOOD PRESSURE: 113 MMHG

## 2020-10-26 DIAGNOSIS — N64.52 NIPPLE DISCHARGE: ICD-10-CM

## 2020-10-26 PROCEDURE — 99024 POSTOP FOLLOW-UP VISIT: CPT

## 2020-10-26 NOTE — HISTORY OF PRESENT ILLNESS
[FreeTextEntry1] : This is a 55 year old  postmenopausal female who has noticed clear discharge on her nightgown, possibly from both breasts, for about 3 months. She then noticed brown discharge form her right nipple.\par \par AN MRI showed abnormal retroareolar enhancing nodules.  MRI core biopsy showed a papilloma.  She underwent Saviscout localized right breast biopsy on 10/20/20.  She has no complaints.\par \par

## 2020-10-26 NOTE — CONSULT LETTER
[Dear  ___] : Dear  [unfilled], [Consult Letter:] : I had the pleasure of evaluating your patient, [unfilled]. [Sincerely,] : Sincerely, [DrShola  ___] : Dr. FALK

## 2020-10-26 NOTE — DATA REVIEWED
[FreeTextEntry1] : Bilateral mammogram 7/15/2020:  No mammographic evidence of malignancy right.  Questionable calcifications left central inner posterior, rec additional imaging.\par \par Bilateral breast ultrasound 7/20/2020:  No sonographic evidence of malignancy.  Benign appearing left duct ectasia as seen previously.\par \par Left diagnostic mammogram 7/20/2020:  Benign findings.  A few scattered round calcifications.\par \par Bilateral breast MRI 9/11/2020:  Right retroareolar 4mm and 6 mm enhancing nodules.  Right hepatic lobe 5.6 cm nonenhancing mass, probable hepatic cyst. (Patient believes she went for an ultrasound)

## 2020-10-28 ENCOUNTER — NON-APPOINTMENT (OUTPATIENT)
Age: 55
End: 2020-10-28

## 2020-10-28 PROBLEM — R20.2 PARESTHESIA OF SKIN: Chronic | Status: ACTIVE | Noted: 2020-10-09

## 2020-10-28 PROBLEM — R76.8 OTHER SPECIFIED ABNORMAL IMMUNOLOGICAL FINDINGS IN SERUM: Chronic | Status: ACTIVE | Noted: 2020-10-09

## 2020-10-28 PROBLEM — M19.90 UNSPECIFIED OSTEOARTHRITIS, UNSPECIFIED SITE: Chronic | Status: ACTIVE | Noted: 2020-10-09

## 2020-10-28 PROBLEM — G37.3 ACUTE TRANSVERSE MYELITIS IN DEMYELINATING DISEASE OF CENTRAL NERVOUS SYSTEM: Chronic | Status: ACTIVE | Noted: 2020-10-09

## 2020-10-28 PROBLEM — G62.9 POLYNEUROPATHY, UNSPECIFIED: Chronic | Status: ACTIVE | Noted: 2020-10-09

## 2020-10-28 PROBLEM — D05.11 INTRADUCTAL CARCINOMA IN SITU OF RIGHT BREAST: Chronic | Status: ACTIVE | Noted: 2020-10-09

## 2020-10-28 PROBLEM — H20.9 UNSPECIFIED IRIDOCYCLITIS: Chronic | Status: ACTIVE | Noted: 2020-10-09

## 2020-10-28 PROBLEM — M47.9 SPONDYLOSIS, UNSPECIFIED: Chronic | Status: ACTIVE | Noted: 2020-10-09

## 2020-11-04 ENCOUNTER — APPOINTMENT (OUTPATIENT)
Dept: HEPATOLOGY | Facility: CLINIC | Age: 55
End: 2020-11-04
Payer: COMMERCIAL

## 2020-11-04 VITALS
RESPIRATION RATE: 19 BRPM | DIASTOLIC BLOOD PRESSURE: 93 MMHG | HEART RATE: 86 BPM | SYSTOLIC BLOOD PRESSURE: 149 MMHG | TEMPERATURE: 96.9 F | WEIGHT: 180 LBS | BODY MASS INDEX: 27.28 KG/M2 | HEIGHT: 68 IN

## 2020-11-04 DIAGNOSIS — Z01.818 ENCOUNTER FOR OTHER PREPROCEDURAL EXAMINATION: ICD-10-CM

## 2020-11-04 DIAGNOSIS — Z11.3 ENCOUNTER FOR SCREENING FOR INFECTIONS WITH A PREDOMINANTLY SEXUAL MODE OF TRANSMISSION: ICD-10-CM

## 2020-11-04 DIAGNOSIS — Z09 ENCOUNTER FOR FOLLOW-UP EXAMINATION AFTER COMPLETED TREATMENT FOR CONDITIONS OTHER THAN MALIGNANT NEOPLASM: ICD-10-CM

## 2020-11-04 DIAGNOSIS — K76.9 LIVER DISEASE, UNSPECIFIED: ICD-10-CM

## 2020-11-04 DIAGNOSIS — S76.111A STRAIN OF RIGHT QUADRICEPS MUSCLE, FASCIA AND TENDON, INITIAL ENCOUNTER: ICD-10-CM

## 2020-11-04 LAB
AFP-TM SERPL-MCNC: <1.8 NG/ML
CANCER AG19-9 SERPL-ACNC: <2 U/ML
CEA SERPL-MCNC: 2.2 NG/ML

## 2020-11-04 PROCEDURE — 99072 ADDL SUPL MATRL&STAF TM PHE: CPT

## 2020-11-04 PROCEDURE — 99203 OFFICE O/P NEW LOW 30 MIN: CPT

## 2020-11-04 PROCEDURE — 99214 OFFICE O/P EST MOD 30 MIN: CPT

## 2020-11-04 NOTE — HISTORY OF PRESENT ILLNESS
[de-identified] : Ms. Vicente is a 56 yo F with anterior uveitis, right hip osteoarthritis, recently-diagnosed transverse myelitis that manifested with peripheral neuropathy (planned to start rituximab therapy soon), and a family history of Crohn's disease (father) and personal ASCA positivity but without endoscopic evidence of IBD, who was referred for further evaluation of liver cysts.\par \par As part of the work-up for her peripheral neuropathy, she underwent MRI of the thoracic spine at [facility] on 4/29/20 that, in the visualized portion of the liver, showed an 8 mm T2 hyperintense lesion that was incompletely characterized but likely representing either a cyst or a hemangioma. More recently, she underwent an abdominal US at [facility] on 9/24/20 and became concerned that the previously-noted lesion had grown to >6 cm in size.\par \par Abdominal US (9/24/20): The liver is normal in echogenicity and size, measuring 12.8 cm in length. Multiple cysts are visualized, with the largest measured. There are 2 cysts in the right lobe measuring 6.8 x 6.2 x 5.3 cm and measuring 1.3 x 1.0 x 1.1 cm. Otherwise normal sonogram.\par \par She denies any abdominal pain. She was [place] in [place] and traveled to [place] in the summer of 2018, but has not traveled to [place], or [place] previously.\par \par She has no known family history of liver disease.

## 2020-11-04 NOTE — ASSESSMENT
[FreeTextEntry1] : I provided reassurance to Ms. Vicente that I do not think that her liver cyst grew significantly in size from the MRI of the T-spine on 4/29/20 to the abdominal sonogram on 9/24/20. Rather, I think that her liver was incompletely visualized on the prior MRI and that the larger 6.8 cm right hepatic lobe cyst was not visualized on that study. Her liver cysts are almost certainly benign simple cysts.\par \par I ordered labs today to rule out echinococcus (though she does not have a high risk travel history) and, for further reassurance, tumor markers. I also ordered an MRI abdomen with and without contrast for further evaluation, including to visualize and characterize the lesion seen on her prior MRI T-spine, to ensure it is benign.\par \par She has no serologic evidence of chronic HBV or HCV infection, but given planned use of rituximab, I ordered HBcAb with labs today to rule out prior exposure that could put her at risk of reactivation.\par \par Although her prior labs are also notable for positive MADHURI with titer ranging from 1:80 to 1:320, positive RNP antibody, and ASCA IgG, I do not feel these have any significance in terms of risk of liver disease and her total IgG and IgG4 were both within normal limits.\par \par Assuming that her HBcAb is negative and that she does not require antiviral prophylaxis against HBV reactivation, there is no contraindication from a hepatology standpoint to her beginning rituximab therapy. Therapy does not need to be postponed while awaiting the MRI of her liver.\par \par She will return for follow-up in 3 months as needed, if any abnormalities noted in the studies above.

## 2020-11-04 NOTE — CONSULT LETTER
[Dear  ___] : Dear  [unfilled], [( Thank you for referring [unfilled] for consultation for _____ )] : Thank you for referring [unfilled] for consultation for [unfilled] [Please see my note below.] : Please see my note below. [Consult Closing:] : Thank you very much for allowing me to participate in the care of this patient.  If you have any questions, please do not hesitate to contact me. [FreeTextEntry2] : Dr. Luisito Redmond [FreeTextEntry3] : Sincerely,\par \par Bibiana Salazar M.D., Ph.D.\par  of Medicine\par [facility]\par [address] [place] [address]\[place] [address]\par Tel: [phone]\par Fax: [phone]\par Cell: [phone]\par E-mail: [email or web address]\par  [DrShola  ___] : Dr. FALK [DrShola ___] : Dr. FALK

## 2020-11-05 LAB
ALBUMIN SERPL ELPH-MCNC: 4.8 G/DL
ALP BLD-CCNC: 102 U/L
ALT SERPL-CCNC: 22 U/L
ANION GAP SERPL CALC-SCNC: 12 MMOL/L
AST SERPL-CCNC: 23 U/L
BILIRUB SERPL-MCNC: 0.5 MG/DL
BUN SERPL-MCNC: 15 MG/DL
CALCIUM SERPL-MCNC: 10.4 MG/DL
CHLORIDE SERPL-SCNC: 102 MMOL/L
CO2 SERPL-SCNC: 26 MMOL/L
CREAT SERPL-MCNC: 0.65 MG/DL
GLUCOSE SERPL-MCNC: 94 MG/DL
HBV CORE IGG+IGM SER QL: NONREACTIVE
HBV DNA # SERPL NAA+PROBE: NOT DETECTED
HBV SURFACE AB SERPL IA-ACNC: <3 MIU/ML
HEPB DNA PCR LOG: NOT DETECTED LOG10IU/ML
POTASSIUM SERPL-SCNC: 5.4 MMOL/L
PROT SERPL-MCNC: 7 G/DL
SODIUM SERPL-SCNC: 140 MMOL/L

## 2020-11-06 LAB
HCV RNA SERPL NAA DL=5-ACNC: NOT DETECTED IU/ML
HCV RNA SERPL NAA+PROBE-LOG IU: NOT DETECTED LOG10IU/ML

## 2020-11-10 ENCOUNTER — APPOINTMENT (OUTPATIENT)
Dept: HEPATOLOGY | Facility: CLINIC | Age: 55
End: 2020-11-10

## 2020-11-10 LAB — ECHINOCOCCUS AB TITR SER: NEGATIVE

## 2020-11-11 ENCOUNTER — OUTPATIENT (OUTPATIENT)
Dept: OUTPATIENT SERVICES | Facility: HOSPITAL | Age: 55
LOS: 1 days | End: 2020-11-11

## 2020-11-11 ENCOUNTER — APPOINTMENT (OUTPATIENT)
Dept: MRI IMAGING | Facility: CLINIC | Age: 55
End: 2020-11-11
Payer: COMMERCIAL

## 2020-11-11 DIAGNOSIS — Z98.890 OTHER SPECIFIED POSTPROCEDURAL STATES: Chronic | ICD-10-CM

## 2020-11-11 DIAGNOSIS — K76.9 LIVER DISEASE, UNSPECIFIED: ICD-10-CM

## 2020-11-11 PROCEDURE — 74183 MRI ABD W/O CNTR FLWD CNTR: CPT | Mod: 26

## 2020-12-23 NOTE — BRIEF OPERATIVE NOTE - NSICDXBRIEFPREOP_GEN_ALL_CORE_FT
Palliative Medicine Consult Yasmani: [phone] ([phone]) Consult received and appreciated. Chart reviewed. I went to see the patient, but he was off the floor having a procedure. I spoke with his nurse, Jose Antonio Gutierrez RN, who reported that he has gone down to angio. He is to have bilateral nephroureteral stent exchanges under moderate sedation. She also reports that he has been comfortable and does not complain of pain. Per chart review, oncology consult also still pending. Will follow up with patient after he has had his stents exchanged and has been seen by oncology to discuss goals of care further. Jakub Leggett Petdavid, NP-C 
 PRE-OP DIAGNOSIS:  Nipple discharge, bloody 19-Oct-2020 17:38:00  Adriana Montoya  Papilloma of right breast 19-Oct-2020 17:37:51  Adriana Montoya

## 2021-01-13 ENCOUNTER — APPOINTMENT (OUTPATIENT)
Dept: RHEUMATOLOGY | Facility: CLINIC | Age: 56
End: 2021-01-13
Payer: COMMERCIAL

## 2021-01-13 PROCEDURE — 99213 OFFICE O/P EST LOW 20 MIN: CPT | Mod: 95

## 2021-01-13 NOTE — PHYSICAL EXAM
[General Appearance - Alert] : alert [General Appearance - Well Nourished] : well nourished [General Appearance - Well Developed] : well developed [Neck Appearance] : the appearance of the neck was normal [Oriented To Time, Place, And Person] : oriented to person, place, and time [Impaired Insight] : insight and judgment were intact [Affect] : the affect was normal

## 2021-01-13 NOTE — ASSESSMENT
[FreeTextEntry1] : 54-year-old  female with right hip OA \par \par Right hip osteoarthritis-\par -she is waiting for a second opinion with orthopedics\par -She may be a surgical candidate at this point\par \par Uveitis-\par -she has a recent diagnosis \par -Autoimmune workup has otherwise been negative except for a positive MADHURI with a positive RNP antibody\par \par Positive MADHURI-\par On Last set of labs her MADHURI is positive, she has a RNP antibody. The significance of these labs is unclear. She denies Raynaud's phenomenon. At this time she does not have any features consistent with lupus myositis scleroderma. We'll continue with clinical surveillance.Echo was normal\par \par Neuropathy-\par She has seen 4 different neurologists, currently being ruled out for transverse myelitis, scheduled for EMG and spinal tap. Status post 5 days of Solu-Medrol with minimal improvement, she is improved with gabapentin 3 times a day. She is now on Rituxan, has completed 2 doses, which is a complete response to it.\par We will repeat autoimmune panel today.\par Followup 6-8  months unless there is any change in her symptoms

## 2021-01-13 NOTE — HISTORY OF PRESENT ILLNESS
[FreeTextEntry1] : follow up visit with the patient via tele-video using [employer], patient gives verbal consent. Patient is at remote location in [place]. Provider is at [address] [place] [address]\par Patient has seen several specialists since her last visit with me, she has received rituximab with neurology in December that was the second dose, she has noticed an improvement in her symptoms. She also saw gastroenterology she has a hepatic cyst, she was planning to get her hip replaced but has decided to hold off. As far as her eyes are concerned she continues to do with her uveitis, she continues to be on steroid drops but on tapering doses.\par \par She currently has a diagnosis of transverse myelitis, the etiology is unclear. She has been ruled out for MS. She feels that the Rituxan has helped. She is about 20% better.\par \par She denies any new symptoms. She denies alopecia oral ulcers sicca symptoms or Raynaud's phenomenon. She denies dysphagia, muscle pain or muscle weakness.\par \par Physical appetite and denies weight loss. She denies fevers or chills or night sweats. She rates her current symptoms at a 4/10.

## 2021-02-09 ENCOUNTER — APPOINTMENT (OUTPATIENT)
Dept: HEPATOLOGY | Facility: CLINIC | Age: 56
End: 2021-02-09

## 2021-02-12 ENCOUNTER — NON-APPOINTMENT (OUTPATIENT)
Age: 56
End: 2021-02-12

## 2021-02-23 ENCOUNTER — APPOINTMENT (OUTPATIENT)
Dept: ENDOCRINOLOGY | Facility: CLINIC | Age: 56
End: 2021-02-23
Payer: COMMERCIAL

## 2021-02-23 VITALS
BODY MASS INDEX: 28.1 KG/M2 | OXYGEN SATURATION: 100 % | TEMPERATURE: 98.2 F | HEART RATE: 82 BPM | WEIGHT: 185.38 LBS | RESPIRATION RATE: 16 BRPM | HEIGHT: 68 IN | DIASTOLIC BLOOD PRESSURE: 90 MMHG | SYSTOLIC BLOOD PRESSURE: 130 MMHG

## 2021-02-23 PROCEDURE — 99203 OFFICE O/P NEW LOW 30 MIN: CPT

## 2021-02-23 PROCEDURE — 99072 ADDL SUPL MATRL&STAF TM PHE: CPT

## 2021-02-25 LAB
T3 SERPL-MCNC: 95 NG/DL
T4 FREE SERPL-MCNC: 1.1 NG/DL
TSH SERPL-ACNC: 1.18 UIU/ML

## 2021-03-04 NOTE — REVIEW OF SYSTEMS
[Pain/Numbness of Digits] : pain/numbness of digits [Negative] : Heme/Lymph [de-identified] : numbness torso and tip of the tongue

## 2021-03-04 NOTE — ASSESSMENT
[FreeTextEntry1] : NTMNG \par check TFts  today \par US report revised and results discussed with patient. Images reviewed and compared to prior study.\par no dysphagia, no dysphonia, no neck pain, no voice change\par she feels nodularity in her throat. She was investigated by ENT and thought to be GERD. \par US images reviewed and r dominant nodule was stable compared to previous 2019 US .She has FNA \par He daughter just got diagnosed with thyroid cancer. \par R small hypoechoic isthmus 5 mm nodule with protrusion through surface on sagittal view and possible microcalcifications ???/ I would FNA . \par \par Overweight: \par discussed diet and exercise\par encouraged more exercise walking 30 min 3 x week but difficult due to recent hip replacement\par Needs to try to have more protein for meals\par .\par

## 2021-03-04 NOTE — HISTORY OF PRESENT ILLNESS
[FreeTextEntry1] : quality: ntmng \par onset 2019\par severity: moderate \par modifying factors: overweight \par associated factors: transverse melitis \par

## 2021-03-12 ENCOUNTER — APPOINTMENT (OUTPATIENT)
Dept: ORTHOPEDIC SURGERY | Facility: CLINIC | Age: 56
End: 2021-03-12
Payer: COMMERCIAL

## 2021-03-12 VITALS
SYSTOLIC BLOOD PRESSURE: 113 MMHG | DIASTOLIC BLOOD PRESSURE: 78 MMHG | HEART RATE: 84 BPM | BODY MASS INDEX: 28.04 KG/M2 | WEIGHT: 185 LBS | HEIGHT: 68 IN

## 2021-03-12 PROCEDURE — 73502 X-RAY EXAM HIP UNI 2-3 VIEWS: CPT | Mod: RT

## 2021-03-12 PROCEDURE — 99214 OFFICE O/P EST MOD 30 MIN: CPT

## 2021-03-12 PROCEDURE — 99072 ADDL SUPL MATRL&STAF TM PHE: CPT

## 2021-03-12 NOTE — HISTORY OF PRESENT ILLNESS
[Pain Location] : pain [Worsening] : worsening [___ yrs] : [unfilled] year(s) ago [6] : a current pain level of 6/10 [7] : a maximum pain level of 7/10 [Walking] : worsened by walking [Rest] : relieved by rest [de-identified] : 55 year old female here for f/u of right hip pain. She is aware that her pervious MRI of her right hip that was taken in November 2019 showed moderate to severe right hip osteoarthritis and that she is a candidate for a right THR. Patent states pain started in September 2019 after starting gym. Patient locates pain to anterior hip and groin. She reports pain is worsening overall. she c/o muscle weakness. She reports pain is worse with ascending stairs and ambulating. She notes limitations to her activities due to pain. Patient was on Indocin for pain from Rheumatology however stopped due to intolerance. Patient was on Mobic with no relief. Patient was seen in December by Dr. Callahan who administered Intra-articular injection to right with with minimal improvement. patient was seen by Dr. Wang as well and PMR due to back pain. Patient has attempted PT in the past noting improvement. \par pt is now on rituxan infusion, last dose in december.\par \par \par pt do wake about 2 miles with her dog with causes pain.\par she note pain relief with ibuporfen. \par she has diagonosed with neuropathy in UE and under the care of neurology. pt is on gabapentin\par she asks if her hip pain is related to neuropathy. \par she failed to responded to guided steroid injection in 11/2019

## 2021-03-12 NOTE — DISCUSSION/SUMMARY
[Medication Risks Reviewed] : Medication risks reviewed [de-identified] : 54 y/o F with bone on bone right hip osteoarthritis. Conservative therapy and surgical options discussed in detail with the patient. The patient is a candidate for a right KORY. We discussed pre-op, surgery, and post-op in detail. She notes that she is interested in pursuing surgery in the near future. Pt is currently taking Indocin. She is aware that she must stop taking the Indocin 7 months prior to surgery. We provided an rx today for PT to help strengthen her muscles and to try to alleviate her symptoms. She should call the office three months prior to when she would like to pursue surgery. \par \par The patient is a 55 year individual with end stage arthritis of their right hip joint. Based upon the patient's continued symptoms and failure to respond to conservative treatment I have recommended a right total hip arthroplasty for this patient. A long discussion took place with the patient describing what a total joint replacement is and what the procedure would entail. A total hip arthroplasty model, similar to the implant that will be used during the operation, was utilized to demonstrate and to discuss the various bearing surfaces of the implants. The hospitalization and post-operative care and rehabilitation were also discussed. The use of perioperative antibiotics and DVT prophylaxis were discussed. The risk, benefits and alternatives to a surgical intervention were discussed at length with the patient. The patient was also advised of risks related to the medical comorbidities, elevated body mass index (BMI), and smoking where applicable. We discussed how to reduce modifiable risk factors and encouraged smoking cessation were applicable.. A lengthy discussion took place to review the most common complications including but not limited to: deep vein thrombosis, pulmonary embolus, heart attack, stroke, infection, wound breakdown, numbness, damage to nerves, tendon, muscles, arteries or other blood vessels, death and other possible complications from anesthesia. The patient was told that we will take steps to minimize these risks by using sterile technique, antibiotics and DVT prophylaxis when appropriate and follow the patient postoperatively in the office setting to monitor progress. The possibility of recurrent pain, no improvement in pain and actual worsening of pain were also discussed with the patient.\par The discharge plan of care focused on the patient going home following surgery. The patient was encouraged to make the necessary arrangements to have someone stay with them when they are discharged home. Following discharge, a home care nurse will visit the patient. The home care nurse will open your home care case and request home physical therapy services. Home physical therapy will commence following discharge provided it is appropriate and covered by the health insurance benefit plan. \par The benefits of surgery were discussed with the patient including the potential for improving her current clinical condition through operative intervention. Alternatives to surgical intervention including continued conservative management were also discussed in detail. All questions were answered to the satisfaction of the patient. The treatment plan of care, as well as a model of a total hip arthroplasty equivalent to the one that will be used for their total joint replacement, was shared with the patient. The patient agreed to the plan of care as well as the use of implants in their total joint replacement.

## 2021-03-12 NOTE — PHYSICAL EXAM
[de-identified] : GENERAL APPEARANCE: Well nourished and hydrated, pleasant, alert, and oriented x 3. Appears their stated age. \par HEENT: Normocephalic, extraocular eye motion intact. Nasal septum midline. Oral cavity clear. External auditory canal clear. \par RESPIRATORY: Breath sounds clear and audible in all lobes. No wheezing, No accessory muscle use.\par CARDIOVASCULAR: No apparent abnormalities. No lower leg edema. No varicosities. Pedal pulses are palpable.\par NEUROLOGIC: Sensation is normal, no muscle weakness in the upper or lower extremities.\par DERMATOLOGIC: No apparent skin lesions, moist, warm, no rash.\par SPINE: Cervical spine appears normal and moves freely; thoracic spine appears normal and moves freely; lumbosacral spine appears normal and moves freely, normal, nontender.\par MUSCULOSKELETAL: Hands, wrists, and elbows are normal and move freely, shoulders are normal and move freely. \par Musculoskeletal: antalgic . Right hip exam shows pain with straight leg raise, positive Stinchfield maneuver, groin pain with external rotation, she has lost internal rotation, no tenderness over the greater trochanteric bursa. \par Left hip exam shows able to straight leg raise without pain. \par 5/5 motor strength in bilateral lower extremities. Sensory: Intact in bilateral lower extremities. DTRs: Biceps, brachioradialis, triceps, patellar, ankle and plantar 2+ and symmetric bilaterally. Pulses: dorsalis pedis, posterior tibial, femoral, popliteal, and radial 2+ and symmetric bilaterally. \par Constitutional: Alert and in no acute distress, but well-appearing, not in acute distress and not obese.  [de-identified] : 3V xray of the right hip done in the office today and reviewed by Dr. Tejinder Bowman demonstrates bone on bone right hip osteoarthritis

## 2021-03-12 NOTE — END OF VISIT
[FreeTextEntry3] : I, Oleksandr Mayo, acted solely as a scribe for Dr. Tejinder Bowman on this date 03/12/2021.

## 2021-03-17 ENCOUNTER — LABORATORY RESULT (OUTPATIENT)
Age: 56
End: 2021-03-17

## 2021-03-19 ENCOUNTER — NON-APPOINTMENT (OUTPATIENT)
Age: 56
End: 2021-03-19

## 2021-03-19 LAB
ALBUMIN SERPL ELPH-MCNC: 5 G/DL
ALP BLD-CCNC: 115 U/L
ALT SERPL-CCNC: 19 U/L
ANA SER IF-ACNC: NEGATIVE
ANION GAP SERPL CALC-SCNC: 12 MMOL/L
AST SERPL-CCNC: 22 U/L
BASOPHILS # BLD AUTO: 0.04 K/UL
BASOPHILS NFR BLD AUTO: 0.5 %
BILIRUB SERPL-MCNC: 0.4 MG/DL
BUN SERPL-MCNC: 13 MG/DL
CALCIUM SERPL-MCNC: 10.2 MG/DL
CCP AB SER IA-ACNC: <8 UNITS
CENTROMERE IGG SER-ACNC: <0.2 CD:130001892
CHLORIDE SERPL-SCNC: 101 MMOL/L
CK SERPL-CCNC: 179 U/L
CO2 SERPL-SCNC: 29 MMOL/L
CREAT SERPL-MCNC: 0.7 MG/DL
CREAT SPEC-SCNC: 71 MG/DL
CREAT/PROT UR: 0.1 RATIO
CRP SERPL-MCNC: <3 MG/L
DSDNA AB SER-ACNC: <12 IU/ML
ENA RNP AB SER IA-ACNC: 7.8 AL
ENA SM AB SER IA-ACNC: 0.2 AL
ENA SS-A AB SER IA-ACNC: 0.2 AL
ENA SS-B AB SER IA-ACNC: <0.2 AL
EOSINOPHIL # BLD AUTO: 0.27 K/UL
EOSINOPHIL NFR BLD AUTO: 3.7 %
ERYTHROCYTE [SEDIMENTATION RATE] IN BLOOD BY WESTERGREN METHOD: 11 MM/HR
GLUCOSE SERPL-MCNC: 118 MG/DL
HCT VFR BLD CALC: 47.6 %
HGB BLD-MCNC: 15.1 G/DL
IMM GRANULOCYTES NFR BLD AUTO: 0.1 %
LUPUS ANTICOAGULANT CASCADE REFLEX: NORMAL
LYMPHOCYTES # BLD AUTO: 3.44 K/UL
LYMPHOCYTES NFR BLD AUTO: 46.5 %
MAN DIFF?: NORMAL
MCHC RBC-ENTMCNC: 30.2 PG
MCHC RBC-ENTMCNC: 31.7 GM/DL
MCV RBC AUTO: 95.2 FL
MONOCYTES # BLD AUTO: 0.6 K/UL
MONOCYTES NFR BLD AUTO: 8.1 %
MPO AB + PR3 PNL SER: NORMAL
NEUTROPHILS # BLD AUTO: 3.03 K/UL
NEUTROPHILS NFR BLD AUTO: 41.1 %
PLATELET # BLD AUTO: 274 K/UL
POTASSIUM SERPL-SCNC: 3.9 MMOL/L
PROT SERPL-MCNC: 7.3 G/DL
PROT UR-MCNC: 6 MG/DL
RBC # BLD: 5 M/UL
RBC # FLD: 13 %
RF+CCP IGG SER-IMP: NEGATIVE
RHEUMATOID FACT SER QL: 10 IU/ML
SODIUM SERPL-SCNC: 142 MMOL/L
WBC # FLD AUTO: 7.39 K/UL

## 2021-03-24 LAB — HLA-B27 RELATED AG QL: NEGATIVE

## 2021-03-25 NOTE — PAST MEDICAL HISTORY
[Menarche Age ____] : age at menarche was [unfilled] [Menopause Age____] : age at menopause was [unfilled] [Total Preg ___] : G[unfilled] [Live Births ___] : P[unfilled]  [Age At Live Birth ___] : Age at live birth: [unfilled] [History of Hormone Replacement Treatment] : has no history of hormone replacement treatment [FreeTextEntry2] : 2 daughters [FreeTextEntry7] : x 3 years [FreeTextEntry8] : x 3 years each no

## 2021-03-26 ENCOUNTER — NON-APPOINTMENT (OUTPATIENT)
Age: 56
End: 2021-03-26

## 2021-04-13 ENCOUNTER — APPOINTMENT (OUTPATIENT)
Dept: OTOLARYNGOLOGY | Facility: CLINIC | Age: 56
End: 2021-04-13
Payer: COMMERCIAL

## 2021-04-13 VITALS
WEIGHT: 180 LBS | HEIGHT: 68 IN | SYSTOLIC BLOOD PRESSURE: 144 MMHG | DIASTOLIC BLOOD PRESSURE: 84 MMHG | HEART RATE: 76 BPM | BODY MASS INDEX: 27.28 KG/M2

## 2021-04-13 DIAGNOSIS — E04.2 NONTOXIC MULTINODULAR GOITER: ICD-10-CM

## 2021-04-13 PROCEDURE — 99243 OFF/OP CNSLTJ NEW/EST LOW 30: CPT

## 2021-04-13 PROCEDURE — 99072 ADDL SUPL MATRL&STAF TM PHE: CPT

## 2021-04-13 NOTE — HISTORY OF PRESENT ILLNESS
[None] : No associated symptoms are reported. [de-identified] : Mrs. Vicente is a 56 yo female who is being referred by Dr. Mccord (Endocrinologist) for suspicious thyroid nodule. Reports thyroid nodule was first diagnosed in 2019, and then during an MRI of c spine a second thyroid nodule was noticed.  \par 3/11/2021 FNA  right isthmus 0.5 cm nodule [place] category III, thyroseq positive BRAF \par Daughter had thyroid cancer 2019\par 1/2019 FNA right thyroid nodule [place] category II\par 11/2020 Ultrasound thyroid nodules not significantly changed from 2019\par 9/2019 ultrasound showed isthmus hypoechoic nodule measuring 0.5 cm; Also 3 nodules largest  measuring 1.3 cm \par Denies pain, dysphagia, dysphonia and or dyspnea \par Denies smoking. Occasionally alcohol intake.\par Last Rituxan treatment for transverse myletitis on 12/2020 \par Received 2nd Covid 19 vaccine 4/8\par \par nodularity R lobe initially picked up on an MRI.  \par \par Pts neurologist who Rx Retuxan is Dr Chris Mcallister

## 2021-04-13 NOTE — CONSULT LETTER
[Dear  ___] : Dear  [unfilled], [Consult Letter:] : I had the pleasure of evaluating your patient, [unfilled]. [Please see my note below.] : Please see my note below. [Consult Closing:] : Thank you very much for allowing me to participate in the care of this patient.  If you have any questions, please do not hesitate to contact me. [Sincerely,] : Sincerely, [FreeTextEntry2] : Jodie Mccord MD ( [place], [place]) [FreeTextEntry3] : Damian Allen MD, FACS\par \par    [facility]\par Associate Chair\par    Department of Otolaryngology\par \par Professor\par Otolaryngology & Molecular Medicine\par [facility] [employer]\par

## 2021-04-15 ENCOUNTER — APPOINTMENT (OUTPATIENT)
Dept: CARDIOLOGY | Facility: CLINIC | Age: 56
End: 2021-04-15

## 2021-05-17 ENCOUNTER — APPOINTMENT (OUTPATIENT)
Dept: ENDOCRINOLOGY | Facility: CLINIC | Age: 56
End: 2021-05-17

## 2021-07-07 ENCOUNTER — APPOINTMENT (OUTPATIENT)
Dept: MRI IMAGING | Facility: CLINIC | Age: 56
End: 2021-07-07
Payer: COMMERCIAL

## 2021-07-07 ENCOUNTER — APPOINTMENT (OUTPATIENT)
Dept: ULTRASOUND IMAGING | Facility: CLINIC | Age: 56
End: 2021-07-07
Payer: COMMERCIAL

## 2021-07-07 ENCOUNTER — APPOINTMENT (OUTPATIENT)
Dept: MAMMOGRAPHY | Facility: CLINIC | Age: 56
End: 2021-07-07
Payer: COMMERCIAL

## 2021-07-07 ENCOUNTER — OUTPATIENT (OUTPATIENT)
Dept: OUTPATIENT SERVICES | Facility: HOSPITAL | Age: 56
LOS: 1 days | End: 2021-07-07
Payer: COMMERCIAL

## 2021-07-07 DIAGNOSIS — Z98.890 OTHER SPECIFIED POSTPROCEDURAL STATES: Chronic | ICD-10-CM

## 2021-07-07 DIAGNOSIS — R92.8 OTHER ABNORMAL AND INCONCLUSIVE FINDINGS ON DIAGNOSTIC IMAGING OF BREAST: ICD-10-CM

## 2021-07-07 DIAGNOSIS — Z00.8 ENCOUNTER FOR OTHER GENERAL EXAMINATION: ICD-10-CM

## 2021-07-07 PROCEDURE — 77067 SCR MAMMO BI INCL CAD: CPT | Mod: 26

## 2021-07-07 PROCEDURE — 77063 BREAST TOMOSYNTHESIS BI: CPT | Mod: 26

## 2021-07-07 PROCEDURE — 70551 MRI BRAIN STEM W/O DYE: CPT | Mod: 26

## 2021-07-07 PROCEDURE — 77067 SCR MAMMO BI INCL CAD: CPT

## 2021-07-07 PROCEDURE — 76641 ULTRASOUND BREAST COMPLETE: CPT

## 2021-07-07 PROCEDURE — 76641 ULTRASOUND BREAST COMPLETE: CPT | Mod: 26,50

## 2021-07-07 PROCEDURE — 77063 BREAST TOMOSYNTHESIS BI: CPT

## 2021-07-07 PROCEDURE — 70551 MRI BRAIN STEM W/O DYE: CPT

## 2021-08-31 ENCOUNTER — NON-APPOINTMENT (OUTPATIENT)
Age: 56
End: 2021-08-31

## 2021-09-10 ENCOUNTER — APPOINTMENT (OUTPATIENT)
Dept: ORTHOPEDIC SURGERY | Facility: CLINIC | Age: 56
End: 2021-09-10
Payer: COMMERCIAL

## 2021-09-10 VITALS
SYSTOLIC BLOOD PRESSURE: 130 MMHG | WEIGHT: 175 LBS | HEIGHT: 68 IN | BODY MASS INDEX: 26.52 KG/M2 | HEART RATE: 84 BPM | DIASTOLIC BLOOD PRESSURE: 82 MMHG

## 2021-09-10 PROCEDURE — 99214 OFFICE O/P EST MOD 30 MIN: CPT | Mod: NC

## 2021-09-10 PROCEDURE — 73562 X-RAY EXAM OF KNEE 3: CPT | Mod: 26,LT

## 2021-09-10 NOTE — PHYSICAL EXAM
[LE] : Sensory: Intact in bilateral lower extremities [Antalgic] : not antalgic [de-identified] : GENERAL APPEARANCE: Well nourished and hydrated, pleasant, alert, and oriented x 3. Appears their stated age. \par HEENT: Normocephalic, extraocular eye motion intact. Nasal septum midline. Oral cavity clear. External auditory canal clear. \par RESPIRATORY: Breath sounds clear and audible in all lobes. No wheezing, No accessory muscle use.\par CARDIOVASCULAR: No apparent abnormalities. No lower leg edema. No varicosities. Pedal pulses are palpable.\par NEUROLOGIC: Sensation is normal, no muscle weakness in the upper or lower extremities.\par DERMATOLOGIC: No apparent skin lesions, moist, warm, no rash.\par SPINE: Cervical spine appears normal and moves freely; thoracic spine appears normal and moves freely; lumbosacral spine appears normal and moves freely, normal, nontender.\par MUSCULOSKELETAL: Hands, wrists, and elbows are normal and move freely, shoulders are normal and move freely. \par Musculoskeletal: antalgic . Right hip exam shows pain with straight leg raise, positive Stinchfield maneuver, groin pain with external rotation, she has lost internal rotation, no tenderness over the greater trochanteric bursa. \par Left hip exam shows able to straight leg raise without pain. \par 5/5 motor strength in bilateral lower extremities. Sensory: Intact in bilateral lower extremities. DTRs: Biceps, brachioradialis, triceps, patellar, ankle and plantar 2+ and symmetric bilaterally. Pulses: dorsalis pedis, posterior tibial, femoral, popliteal, and radial 2+ and symmetric bilaterally. \par Constitutional: Alert and in no acute distress, but well-appearing, not in acute distress and not obese. \par  [de-identified] : left knee pain showed mild crepitus in the patellofemoral joint \par ROM is 0-130 degree no effusion.  [de-identified] : 3 view left knee xray showed mild patellofemorla o.a\par

## 2021-09-10 NOTE — HISTORY OF PRESENT ILLNESS
[3] : a current pain level of 3/10 [5] : a maximum pain level of 5/10 [Walking] : walking [Knee Flexion] : worsened with knee flexion [NSAIDs] : relieved by nonsteroidal anti-inflammatory drugs [Rest] : relieved by rest [de-identified] : 55 year old female here for evaluation of left knee pain\par pt has severe right hip o.a . She is aware that her pervious MRI of her right hip that was taken in November 2019 showed moderate to severe right hip osteoarthritis and that she is a candidate for a right THR. pt was planing ot have right Kristian but her pain improved. but she felt the left knee anterior pain with staris or lunges Patent states pain started in Patient was on Mobic with no relief. Patient was seen in December by Dr. Callahan who administered Intra-articular injection to right with with minimal improvement. patient was seen by Dr. Wang as well and PMR due to back pain. Patient has attempted PT in the past noting improvement. \par pt is now on rituxan infusion, last dose in December.\par \par she failed to responded to guided steroid injection in 11/2019

## 2021-09-10 NOTE — DISCUSSION/SUMMARY
[de-identified] : Medication risks reviewed. 55 y/o F with bone on bone right hip osteoarthritis and mild left knee patellofemora o.a. Conservative therapy and surgical options discussed in detail with the patient. The patient is a candidate for a right KORY. We discussed pre-op, surgery, and post-op in detail. She notes that she is interested in pursuing surgery in the near future. regards to left knee, pt decided to do activity modification.   She is aware that she must stop taking the Indocin 7 months prior to surgery. She should call the office three months prior to when she would like to pursue surgery. \par \par The patient is a 56 year individual with end stage arthritis of their right hip joint. Based upon the patient's continued symptoms and failure to respond to conservative treatment I have recommended a right total hip arthroplasty for this patient. A long discussion took place with the patient describing what a total joint replacement is and what the procedure would entail. A total hip arthroplasty model, similar to the implant that will be used during the operation, was utilized to demonstrate and to discuss the various bearing surfaces of the implants. The hospitalization and post-operative care and rehabilitation were also discussed. The use of perioperative antibiotics and DVT prophylaxis were discussed. The risk, benefits and alternatives to a surgical intervention were discussed at length with the patient. The patient was also advised of risks related to the medical comorbidities, elevated body mass index (BMI), and smoking where applicable. We discussed how to reduce modifiable risk factors and encouraged smoking cessation were applicable.. A lengthy discussion took place to review the most common complications including but not limited to: deep vein thrombosis, pulmonary embolus, heart attack, stroke, infection, wound breakdown, numbness, damage to nerves, tendon, muscles, arteries or other blood vessels, death and other possible complications from anesthesia. The patient was told that we will take steps to minimize these risks by using sterile technique, antibiotics and DVT prophylaxis when appropriate and follow the patient postoperatively in the office setting to monitor progress. The possibility of recurrent pain, no improvement in pain and actual worsening of pain were also discussed with the patient.\par The discharge plan of care focused on the patient going home following surgery. The patient was encouraged to make the necessary arrangements to have someone stay with them when they are discharged home. Following discharge, a home care nurse will visit the patient. The home care nurse will open your home care case and request home physical therapy services. Home physical therapy will commence following discharge provided it is appropriate and covered by the health insurance benefit plan. \par The benefits of surgery were discussed with the patient including the potential for improving her current clinical condition through operative intervention. Alternatives to surgical intervention including continued conservative management were also discussed in detail. All questions were answered to the satisfaction of the patient. The treatment plan of care, as well as a model of a total hip arthroplasty equivalent to the one that will be used for their total joint replacement, was shared with the patient. The patient agreed to the plan of care as well as the use of implants in their total joint replacement. \par

## 2021-09-13 ENCOUNTER — APPOINTMENT (OUTPATIENT)
Dept: RHEUMATOLOGY | Facility: CLINIC | Age: 56
End: 2021-09-13
Payer: COMMERCIAL

## 2021-09-13 VITALS
SYSTOLIC BLOOD PRESSURE: 117 MMHG | OXYGEN SATURATION: 96 % | HEART RATE: 86 BPM | TEMPERATURE: 97.6 F | DIASTOLIC BLOOD PRESSURE: 79 MMHG | BODY MASS INDEX: 26.61 KG/M2 | WEIGHT: 175 LBS

## 2021-09-13 PROCEDURE — 99214 OFFICE O/P EST MOD 30 MIN: CPT

## 2021-09-15 ENCOUNTER — APPOINTMENT (OUTPATIENT)
Dept: RHEUMATOLOGY | Facility: CLINIC | Age: 56
End: 2021-09-15

## 2021-09-20 ENCOUNTER — APPOINTMENT (OUTPATIENT)
Dept: ENDOCRINOLOGY | Facility: CLINIC | Age: 56
End: 2021-09-20

## 2021-09-20 LAB
ALBUMIN SERPL ELPH-MCNC: 4.4 G/DL
ALP BLD-CCNC: 102 U/L
ALT SERPL-CCNC: 22 U/L
ANION GAP SERPL CALC-SCNC: 16 MMOL/L
AST SERPL-CCNC: 40 U/L
BASOPHILS # BLD AUTO: 0.03 K/UL
BASOPHILS NFR BLD AUTO: 0.5 %
BILIRUB SERPL-MCNC: 0.4 MG/DL
BUN SERPL-MCNC: 17 MG/DL
CALCIUM SERPL-MCNC: 9.4 MG/DL
CHLORIDE SERPL-SCNC: 103 MMOL/L
CO2 SERPL-SCNC: 22 MMOL/L
COVID-19 NUCLEOCAPSID  GAM ANTIBODY INTERPRETATION: NEGATIVE
COVID-19 SPIKE DOMAIN ANTIBODY INTERPRETATION: NEGATIVE
CREAT SERPL-MCNC: 0.72 MG/DL
CRP SERPL-MCNC: <3 MG/L
DEPRECATED KAPPA LC FREE/LAMBDA SER: 1.12 RATIO
EOSINOPHIL # BLD AUTO: 0.12 K/UL
EOSINOPHIL NFR BLD AUTO: 1.9 %
GLUCOSE SERPL-MCNC: 93 MG/DL
HCT VFR BLD CALC: 44.5 %
HGB BLD-MCNC: 14.1 G/DL
IGA SER QL IEP: 88 MG/DL
IGG SER QL IEP: 921 MG/DL
IGM SER QL IEP: 29 MG/DL
IMM GRANULOCYTES NFR BLD AUTO: 0.2 %
KAPPA LC CSF-MCNC: 0.81 MG/DL
KAPPA LC SERPL-MCNC: 0.91 MG/DL
LYMPHOCYTES # BLD AUTO: 2.03 K/UL
LYMPHOCYTES NFR BLD AUTO: 32.7 %
MAN DIFF?: NORMAL
MCHC RBC-ENTMCNC: 30.3 PG
MCHC RBC-ENTMCNC: 31.7 GM/DL
MCV RBC AUTO: 95.5 FL
MONOCYTES # BLD AUTO: 0.59 K/UL
MONOCYTES NFR BLD AUTO: 9.5 %
NEUTROPHILS # BLD AUTO: 3.43 K/UL
NEUTROPHILS NFR BLD AUTO: 55.2 %
PLATELET # BLD AUTO: 207 K/UL
POTASSIUM SERPL-SCNC: 5.4 MMOL/L
PROT SERPL-MCNC: 6.7 G/DL
RBC # BLD: 4.66 M/UL
RBC # FLD: 13.6 %
SARS-COV-2 AB SERPL IA-ACNC: 0.4 U/ML
SARS-COV-2 AB SERPL QL IA: 0.08 INDEX
SODIUM SERPL-SCNC: 141 MMOL/L
WBC # FLD AUTO: 6.21 K/UL

## 2021-09-28 ENCOUNTER — TRANSCRIPTION ENCOUNTER (OUTPATIENT)
Age: 56
End: 2021-09-28

## 2021-10-02 ENCOUNTER — TRANSCRIPTION ENCOUNTER (OUTPATIENT)
Age: 56
End: 2021-10-02

## 2021-10-13 NOTE — ASSESSMENT
[FreeTextEntry1] : PERLITA CHOWDHURY is a 56 year old woman followed for + MADHURI/RNP (reminder of serologies negative) in the setting of anterior uveitis episodes for which she has been managed with steroid eye drops with ophthalmology + episode of transverse myelitis in late 2020 (manifested with peripheral neuropathy), unclear etiology, MS has been ruled out, treated with pulse steroids and Rituxan which she feels has helped both her eyes and nerves. + FH of crohns, personal hx of + ASCA and hepatic cysts. + R hip OA, L knee OA. \par \par - plan for R THR, no rheumatologic contraindications at present \par - c/w eye drops per optho and regular optho f/u  \par - remains with paucity of lupus, myositis, or scleroderma features necessitating treatment and Rituxan can help with any CTD sx as well, we will continue with clinical surveillance. Echo was normal. \par - c/w gabapentin TID at current dose for neuropathy\par - s/p Rituxan, last dose in June with improvement, f/u neurology to determine timeline for next infusion if deemed necessary \par - check labs below including covid Abs given poor response in some pt's on Rituxan as well as Ig levels\par - RTC in 4-6 months, sooner if new/worsening inflammatory sx

## 2021-10-13 NOTE — PHYSICAL EXAM
[General Appearance - Alert] : alert [General Appearance - Well Nourished] : well nourished [Neck Appearance] : the appearance of the neck was normal [Oriented To Time, Place, And Person] : oriented to person, place, and time [Impaired Insight] : insight and judgment were intact [Affect] : the affect was normal [General Appearance - In No Acute Distress] : in no acute distress [Sclera] : the sclera and conjunctiva were normal [PERRL With Normal Accommodation] : pupils were equal in size, round, and reactive to light [Extraocular Movements] : extraocular movements were intact [Outer Ear] : the ears and nose were normal in appearance [Nasal Cavity] : the nasal mucosa and septum were normal [Oropharynx] : the oropharynx was normal [Auscultation Breath Sounds / Voice Sounds] : lungs were clear to auscultation bilaterally [Heart Rate And Rhythm] : heart rate was normal and rhythm regular [Heart Sounds] : normal S1 and S2 [Edema] : there was no peripheral edema [Cervical Lymph Nodes Enlarged Anterior Bilaterally] : anterior cervical [Supraclavicular Lymph Nodes Enlarged Bilaterally] : supraclavicular [No Spinal Tenderness] : no spinal tenderness [Abnormal Walk] : normal gait [Nail Clubbing] : no clubbing  or cyanosis of the fingernails [Musculoskeletal - Swelling] : no joint swelling seen [Motor Tone] : muscle strength and tone were normal [FreeTextEntry1] : No synovitis, effusions, contractures. L knee bony enlargement with crepitus, marked pain and marked limitation to R hip ROM  [] : no rash [No Focal Deficits] : no focal deficits

## 2021-10-13 NOTE — HISTORY OF PRESENT ILLNESS
[FreeTextEntry1] : PERLITA CHOWDHURY is a 56 year old woman with being followed for + MADHURI in the setting of anterior uveitis episodes for which she has been managed with steroid eye drops with ophthalmology + episode of transverse myelitis in late 2020 (manifested with peripheral neuropathy), unclear etiology, MS has been ruled out, treated with pulse steroids and Rituxan which she feels has helped both her eyes and nerves. \par \par + R hip OA, planning to get a THR\par + moderate L knee OA \par + family history of Crohn's disease (father) and personal ASCA positivity but without endoscopic evidence of IBD, + personal hx of liver cysts \par \par ------------\par 9/13/21 -- New to me today. Overall feeling well aside from ongoing R hip and L knee OA related pain, saw ortho recently about R THR. Neuropathy and eye sx are stable. CTD ROS negative for alopecia, sicca, salivary gland swelling, oral ulcers, malar rash, photosensitivity, SOB, chest pain, serositis, abd pain, dysuria, hematuria, rash, joint AM stiffness/synovitis, hematologic abnormalities, Raynauds, calcinosis, telangiectasis, sclerodactyly, dysphagia, focal muscle wasting.

## 2021-10-13 NOTE — REASON FOR VISIT
[Follow-Up: _____] : a [unfilled] follow-up visit [FreeTextEntry1] : + MADHURI, uveitis, R hip OA, neuropathy

## 2021-10-13 NOTE — REVIEW OF SYSTEMS
[Feeling Tired] : feeling tired [Arthralgias] : arthralgias [Joint Pain] : joint pain [Negative] : Heme/Lymph [As Noted in HPI] : as noted in HPI

## 2021-10-20 ENCOUNTER — APPOINTMENT (OUTPATIENT)
Dept: BREAST CENTER | Facility: CLINIC | Age: 56
End: 2021-10-20
Payer: COMMERCIAL

## 2021-10-20 VITALS
BODY MASS INDEX: 26.37 KG/M2 | WEIGHT: 174 LBS | HEIGHT: 68 IN | HEART RATE: 83 BPM | SYSTOLIC BLOOD PRESSURE: 128 MMHG | DIASTOLIC BLOOD PRESSURE: 86 MMHG

## 2021-10-20 PROCEDURE — 99213 OFFICE O/P EST LOW 20 MIN: CPT

## 2021-10-20 RX ORDER — BRIMONIDINE TARTRATE, TIMOLOL MALEATE 2; 5 MG/ML; MG/ML
SOLUTION/ DROPS OPHTHALMIC
Refills: 0 | Status: DISCONTINUED | COMMUNITY
End: 2021-10-20

## 2021-10-20 RX ORDER — PREDNISOLONE ACETATE 10 MG/ML
1 SUSPENSION/ DROPS OPHTHALMIC
Refills: 0 | Status: DISCONTINUED | COMMUNITY
End: 2021-10-20

## 2021-10-20 NOTE — PAST MEDICAL HISTORY
[Menarche Age ____] : age at menarche was [unfilled] [Menopause Age____] : age at menopause was [unfilled] [Total Preg ___] : G[unfilled] [Live Births ___] : P[unfilled]  [Age At Live Birth ___] : Age at live birth: [unfilled] [History of Hormone Replacement Treatment] : has no history of hormone replacement treatment [FreeTextEntry2] : 2 daughters [FreeTextEntry7] : x 3 years [FreeTextEntry8] : x 3 years each

## 2021-10-20 NOTE — HISTORY OF PRESENT ILLNESS
[FreeTextEntry1] : This is a 56 year old  postmenopausal female who has noticed clear discharge on her nightgown, possibly from both breasts, for about 3 months last year. She then noticed brown discharge from her right nipple.\par \par An MRI showed abnormal retroareolar enhancing nodules in the right breast.  Right MRI core biopsy showed a papilloma.  She underwent Saviscout localized right breast biopsy on 10/20/20.  \par \par She has started noticing discharge on her nightgown again.  It's a pea sized spot that she sees in the morning.  She cannot squeeze any discharge out.\par \par

## 2021-10-20 NOTE — DATA REVIEWED
[FreeTextEntry1] : Bilateral mammogram and breast ultrasound 7/7/2021:  No mammographic or sonographic evidence of malignancy.\par \par (Images reviewed on PACS.)

## 2021-10-20 NOTE — PHYSICAL EXAM
[EOMI] : extra ocular movement intact [Sclera nonicteric] : sclera nonicteric [Supple] : supple [No Supraclavicular Adenopathy] : no supraclavicular adenopathy [No Cervical Adenopathy] : no cervical adenopathy [No Thyromegaly] : no thyromegaly [Clear to Auscultation Bilat] : clear to auscultation bilaterally [No dominant masses] : no dominant masses in right breast  [No dominant masses] : no dominant masses left breast [No Nipple Retraction] : no left nipple retraction [No Nipple Discharge] : no left nipple discharge [No Axillary Lymphadenopathy] : no left axillary lymphadenopathy [Soft] : abdomen soft [Not Tender] : non-tender [de-identified] : S [de-identified] : Circumareolar scar 3:00.

## 2021-10-21 ENCOUNTER — NON-APPOINTMENT (OUTPATIENT)
Age: 56
End: 2021-10-21

## 2021-10-21 ENCOUNTER — APPOINTMENT (OUTPATIENT)
Dept: FAMILY MEDICINE | Facility: CLINIC | Age: 56
End: 2021-10-21
Payer: COMMERCIAL

## 2021-10-21 VITALS
HEIGHT: 68 IN | BODY MASS INDEX: 26.67 KG/M2 | DIASTOLIC BLOOD PRESSURE: 80 MMHG | WEIGHT: 176 LBS | SYSTOLIC BLOOD PRESSURE: 130 MMHG | HEART RATE: 89 BPM

## 2021-10-21 DIAGNOSIS — Z00.00 ENCOUNTER FOR GENERAL ADULT MEDICAL EXAMINATION W/OUT ABNORMAL FINDINGS: ICD-10-CM

## 2021-10-21 PROCEDURE — 99396 PREV VISIT EST AGE 40-64: CPT | Mod: 25

## 2021-10-21 PROCEDURE — 93000 ELECTROCARDIOGRAM COMPLETE: CPT

## 2021-10-21 RX ORDER — MULTIVITAMIN
CAPSULE ORAL
Refills: 0 | Status: ACTIVE | COMMUNITY

## 2021-10-24 NOTE — REVIEW OF SYSTEMS
[Dryness] : dryness  [Joint Pain] : joint pain [Negative] : Heme/Lymph [Discharge] : no discharge [Pain] : no pain [Hematuria] : no hematuria [de-identified] : SEE HPI

## 2021-10-24 NOTE — HEALTH RISK ASSESSMENT
[Very Good] : ~his/her~ current health as very good [Excellent] : ~his/her~  mood as  excellent [Yes] : Yes [Monthly or less (1 pt)] : Monthly or less (1 point) [1 or 2 (0 pts)] : 1 or 2 (0 points) [No] : In the past 12 months have you used drugs other than those required for medical reasons? No [No falls in past year] : Patient reported no falls in the past year [HIV Test offered] : HIV Test offered [Hepatitis C test offered] : Hepatitis C test offered [None] : None [With Family] : lives with family [# of Members in Household ___] :  household currently consist of [unfilled] member(s) [Unemployed] : unemployed [College] : College [] :  [# Of Children ___] : has [unfilled] children [Sexually Active] : sexually active [Feels Safe at Home] : Feels safe at home [Fully functional (bathing, dressing, toileting, transferring, walking, feeding)] : Fully functional (bathing, dressing, toileting, transferring, walking, feeding) [Fully functional (using the telephone, shopping, preparing meals, housekeeping, doing laundry, using] : Fully functional and needs no help or supervision to perform IADLs (using the telephone, shopping, preparing meals, housekeeping, doing laundry, using transportation, managing medications and managing finances) [Reports normal functional visual acuity (ie: able to read med bottle)] : Reports normal functional visual acuity [Smoke Detector] : smoke detector [Carbon Monoxide Detector] : carbon monoxide detector [Safety elements used in home] : safety elements used in home [Seat Belt] :  uses seat belt [Sunscreen] : uses sunscreen [With Patient/Caregiver] : , with patient/caregiver [Designated Healthcare Proxy] : Designated healthcare proxy [Name: ___] : Health Care Proxy's Name: [unfilled]  [Relationship: ___] : Relationship: [unfilled] [] : No [Audit-CScore] : 1 [de-identified] : EXERCISE VIDEOS FIVE DAYS A WEEK [de-identified] : KEEPING A HEALTHY DIET - GLUTEN FREE,.  DRINKING WATER. [Change in mental status noted] : No change in mental status noted [Language] : denies difficulty with language [Learning/Retaining New Information] : denies difficulty learning/retaining new information [Handling Complex Tasks] : denies difficulty handling complex tasks [High Risk Behavior] : no high risk behavior [Reports changes in hearing] : Reports no changes in hearing [Reports changes in vision] : Reports no changes in vision [Reports changes in dental health] : Reports no changes in dental health [MammogramDate] : 07/21 [PapSmearDate] : 12/20 [PapSmearComments] : DR. BENTON [ColonoscopyDate] : 03/20 [de-identified] : [employer] [de-identified] : DERMATOLOGY: DR. GILES [AdvancecareDate] : 10/21

## 2021-10-24 NOTE — PLAN
[FreeTextEntry1] : WANTS TO WAIT ON FLU VACCINE\par DISCUSSED SHINGRIX\par VISION SCREENING\par HEALTHY DIET AND LIFESTYLE MODIFICATIONS\par HEALTHY WEIGHT LOSS \par CHECK ROUTINE LAB WORK\par EKG: NSR AT 73 BPM, NO ACUTE ST-T WAVE CHANGES\par SPECIALISTS NOTES APPRECIATED; FOLLOW-UP ALL SPECIALISTS AS DIRECTED \par CALL WITH ANY QUESTIONS, CONCERNS OR CHANGES

## 2021-10-24 NOTE — PHYSICAL EXAM
[No Acute Distress] : no acute distress [Well Nourished] : well nourished [Well-Appearing] : well-appearing [Normal Sclera/Conjunctiva] : normal sclera/conjunctiva [PERRL] : pupils equal round and reactive to light [Normal Outer Ear/Nose] : the outer ears and nose were normal in appearance [Normal Oropharynx] : the oropharynx was normal [Normal TMs] : both tympanic membranes were normal [No Lymphadenopathy] : no lymphadenopathy [Supple] : supple [No Accessory Muscle Use] : no accessory muscle use [No Respiratory Distress] : no respiratory distress  [Clear to Auscultation] : lungs were clear to auscultation bilaterally [Normal Rate] : normal rate  [Regular Rhythm] : with a regular rhythm [Normal S1, S2] : normal S1 and S2 [Soft] : abdomen soft [Non Tender] : non-tender [Normal Bowel Sounds] : normal bowel sounds [No Joint Swelling] : no joint swelling [Grossly Normal Strength/Tone] : grossly normal strength/tone [No Rash] : no rash [Coordination Grossly Intact] : coordination grossly intact [No Focal Deficits] : no focal deficits [Normal Gait] : normal gait [Deep Tendon Reflexes (DTR)] : deep tendon reflexes were 2+ and symmetric [Speech Grossly Normal] : speech grossly normal [Memory Grossly Normal] : memory grossly normal [Normal Mood] : the mood was normal

## 2021-10-24 NOTE — HISTORY OF PRESENT ILLNESS
[FreeTextEntry1] : PHYSICAL [de-identified] : MS. CHOWDHURY IS A PLEASANT 55 YO PRESENTING FOR YEARLY PHYSICAL.  DIAGNOSED WITH PAPILLARY THYROID CANCER AND [place] [facility] [place]OM[place].  [place] [facility] [place]  \[place] [facility] [place]\par SAW NEUROLOGY DR. WINCHESTER\par BREAST SURGERY FOLLOWS FOR PAPILLOMA BREAST.  ROUTINE IMAGING PERFORMED.\par GETTING COVID BOOSTER\par SEEING SPECIALISTS INCLUDING RHEUMATOLOGY, ORTHOPEDICS, NEUROLOGY FOR.+ MADHURI, UVEITIS, RIGHT HIP OA, NEUROPATHY, TRANSVERSE MYELITIS.  GETS TINGLING IN FINGERS AND ARMS, HIP PAIN, DRY EYES

## 2021-10-25 ENCOUNTER — OUTPATIENT (OUTPATIENT)
Dept: OUTPATIENT SERVICES | Facility: HOSPITAL | Age: 56
LOS: 1 days | End: 2021-10-25
Payer: COMMERCIAL

## 2021-10-25 ENCOUNTER — APPOINTMENT (OUTPATIENT)
Dept: MRI IMAGING | Facility: CLINIC | Age: 56
End: 2021-10-25
Payer: COMMERCIAL

## 2021-10-25 DIAGNOSIS — D24.1 BENIGN NEOPLASM OF RIGHT BREAST: ICD-10-CM

## 2021-10-25 DIAGNOSIS — Z98.890 OTHER SPECIFIED POSTPROCEDURAL STATES: Chronic | ICD-10-CM

## 2021-10-25 DIAGNOSIS — N64.52 NIPPLE DISCHARGE: ICD-10-CM

## 2021-10-25 PROCEDURE — C8908: CPT

## 2021-10-25 PROCEDURE — 77049 MRI BREAST C-+ W/CAD BI: CPT | Mod: 26

## 2021-10-25 PROCEDURE — C8937: CPT

## 2021-10-25 PROCEDURE — A9585: CPT

## 2021-10-26 DIAGNOSIS — R92.8 OTHER ABNORMAL AND INCONCLUSIVE FINDINGS ON DIAGNOSTIC IMAGING OF BREAST: ICD-10-CM

## 2021-11-01 ENCOUNTER — APPOINTMENT (OUTPATIENT)
Dept: ORTHOPEDIC SURGERY | Facility: CLINIC | Age: 56
End: 2021-11-01
Payer: COMMERCIAL

## 2021-11-01 VITALS
HEART RATE: 82 BPM | WEIGHT: 174 LBS | HEIGHT: 68 IN | TEMPERATURE: 98.1 F | SYSTOLIC BLOOD PRESSURE: 135 MMHG | BODY MASS INDEX: 26.37 KG/M2 | DIASTOLIC BLOOD PRESSURE: 88 MMHG

## 2021-11-01 DIAGNOSIS — M16.12 UNILATERAL PRIMARY OSTEOARTHRITIS, LEFT HIP: ICD-10-CM

## 2021-11-01 PROCEDURE — 73502 X-RAY EXAM HIP UNI 2-3 VIEWS: CPT

## 2021-11-01 PROCEDURE — 99214 OFFICE O/P EST MOD 30 MIN: CPT

## 2021-11-01 NOTE — END OF VISIT
[FreeTextEntry3] : I, Oleksandr Mayo, acted solely as a scribe for Dr. Tejinder Bowman on this date 11/01/2021.  04-Aug-2021 16:29

## 2021-11-01 NOTE — DISCUSSION/SUMMARY
[de-identified] : 55 y/o F with mild left hip osteoarthritis. We reassured the pt that she is not a candidate for a left KORY. The etiology of her symptoms are unclear, but we suspect if may be due to her known hx of transverse myelitis. She is currently following up with neurology. We gave her a referral to Dr. Jose Armando Roy. F/u PRN.

## 2021-11-01 NOTE — PHYSICAL EXAM
[LE] : Sensory: Intact in bilateral lower extremities [ALL] : dorsalis pedis, posterior tibial, femoral, popliteal, and radial 2+ and symmetric bilaterally [Normal] : Alert and in no acute distress [Poor Appearance] : well-appearing [de-identified] : GENERAL APPEARANCE: Well nourished and hydrated, pleasant, alert, and oriented x 3. Appears their stated age. \par HEENT: Normocephalic, extraocular eye motion intact. Nasal septum midline. Oral cavity clear. External auditory canal clear. \par RESPIRATORY: Breath sounds clear and audible in all lobes. No wheezing, No accessory muscle use.\par CARDIOVASCULAR: No apparent abnormalities. No lower leg edema. No varicosities. Pedal pulses are palpable.\par NEUROLOGIC: Sensation is normal, no muscle weakness in the upper or lower extremities.\par DERMATOLOGIC: No apparent skin lesions, moist, warm, no rash.\par SPINE: Cervical spine appears normal and moves freely; thoracic spine appears normal and moves freely; lumbosacral spine appears normal and moves freely, normal, nontender.\par MUSCULOSKELETAL: Hands, wrists, and elbows are normal and move freely, shoulders are normal and move freely.  [de-identified] : Right hip exam shows pain with straight leg raise, positive Stinchfield maneuver, groin pain with external rotation, she has lost internal rotation, no tenderness over the greater trochanteric bursa. \par Left hip exam shows able to straight leg raise without pain.  [de-identified] : 3V xray of the left hip/pelvis done in the office today and reviewed by Dr. Tejinder Bowman demonstrates mild left hip osteoarthritis and advanced right hip osteoarthritis

## 2021-11-01 NOTE — HISTORY OF PRESENT ILLNESS
[Pain Location] : pain [2] : a current pain level of 2/10 [1] : a minimum pain level of 1/10 [6] : a maximum pain level of 6/10 [Walking] : worsened by walking [Rest] : relieved by rest [de-identified] : 57 y/o F presents with left hip discomfort. She states that she does not have left hip pain, but notes that it gives out about 3-4 times a day. She denies any falls. Her left hip symptoms are in the anterior hip. She has advanced right hip osteoarthritis and states that her right hip pain has been stable since she started on Rituxan. She has transverse myelitis and is followed by rheumatology and neurology.

## 2021-11-02 ENCOUNTER — RESULT REVIEW (OUTPATIENT)
Age: 56
End: 2021-11-02

## 2021-11-02 ENCOUNTER — OUTPATIENT (OUTPATIENT)
Dept: OUTPATIENT SERVICES | Facility: HOSPITAL | Age: 56
LOS: 1 days | End: 2021-11-02
Payer: COMMERCIAL

## 2021-11-02 ENCOUNTER — APPOINTMENT (OUTPATIENT)
Dept: MRI IMAGING | Facility: CLINIC | Age: 56
End: 2021-11-02
Payer: COMMERCIAL

## 2021-11-02 DIAGNOSIS — Z00.8 ENCOUNTER FOR OTHER GENERAL EXAMINATION: ICD-10-CM

## 2021-11-02 DIAGNOSIS — Z98.890 OTHER SPECIFIED POSTPROCEDURAL STATES: Chronic | ICD-10-CM

## 2021-11-02 PROCEDURE — 77065 DX MAMMO INCL CAD UNI: CPT | Mod: 26,RT

## 2021-11-02 PROCEDURE — 77065 DX MAMMO INCL CAD UNI: CPT

## 2021-11-02 PROCEDURE — A4648: CPT

## 2021-11-02 PROCEDURE — 19085 BX BREAST 1ST LESION MR IMAG: CPT | Mod: RT

## 2021-11-02 PROCEDURE — 88305 TISSUE EXAM BY PATHOLOGIST: CPT | Mod: 26

## 2021-11-02 PROCEDURE — 19085 BX BREAST 1ST LESION MR IMAG: CPT

## 2021-11-02 PROCEDURE — 88305 TISSUE EXAM BY PATHOLOGIST: CPT

## 2021-11-02 PROCEDURE — A9585: CPT

## 2021-11-04 ENCOUNTER — TRANSCRIPTION ENCOUNTER (OUTPATIENT)
Age: 56
End: 2021-11-04

## 2021-11-04 LAB
25(OH)D3 SERPL-MCNC: 64.8 NG/ML
ALBUMIN SERPL ELPH-MCNC: 4.7 G/DL
ALP BLD-CCNC: 109 U/L
ALT SERPL-CCNC: 22 U/L
ANION GAP SERPL CALC-SCNC: 14 MMOL/L
APPEARANCE: CLEAR
AST SERPL-CCNC: 24 U/L
BILIRUB SERPL-MCNC: 0.6 MG/DL
BILIRUBIN URINE: NEGATIVE
BLOOD URINE: NEGATIVE
BUN SERPL-MCNC: 14 MG/DL
CALCIUM SERPL-MCNC: 9.5 MG/DL
CHLORIDE SERPL-SCNC: 101 MMOL/L
CHOLEST SERPL-MCNC: 239 MG/DL
CO2 SERPL-SCNC: 26 MMOL/L
COLOR: NORMAL
CREAT SERPL-MCNC: 0.69 MG/DL
ESTIMATED AVERAGE GLUCOSE: 105 MG/DL
GLUCOSE QUALITATIVE U: NEGATIVE
GLUCOSE SERPL-MCNC: 86 MG/DL
HBA1C MFR BLD HPLC: 5.3 %
HDLC SERPL-MCNC: 95 MG/DL
KETONES URINE: NEGATIVE
LDLC SERPL CALC-MCNC: 132 MG/DL
LEUKOCYTE ESTERASE URINE: NEGATIVE
NITRITE URINE: NEGATIVE
NONHDLC SERPL-MCNC: 144 MG/DL
PH URINE: 7.5
POTASSIUM SERPL-SCNC: 4.4 MMOL/L
PROT SERPL-MCNC: 6.9 G/DL
PROTEIN URINE: NEGATIVE
SODIUM SERPL-SCNC: 140 MMOL/L
SPECIFIC GRAVITY URINE: 1.01
T4 FREE SERPL-MCNC: 1.1 NG/DL
TRIGL SERPL-MCNC: 61 MG/DL
TSH SERPL-ACNC: 3.11 UIU/ML
UROBILINOGEN URINE: NORMAL

## 2021-11-05 LAB — ANA SER IF-ACNC: NEGATIVE

## 2021-11-10 LAB — HEMOCCULT STL QL IA: NEGATIVE

## 2022-01-10 ENCOUNTER — APPOINTMENT (OUTPATIENT)
Dept: RHEUMATOLOGY | Facility: CLINIC | Age: 57
End: 2022-01-10

## 2022-02-16 ENCOUNTER — LABORATORY RESULT (OUTPATIENT)
Age: 57
End: 2022-02-16

## 2022-02-17 ENCOUNTER — APPOINTMENT (OUTPATIENT)
Dept: RHEUMATOLOGY | Facility: CLINIC | Age: 57
End: 2022-02-17
Payer: COMMERCIAL

## 2022-02-17 VITALS
OXYGEN SATURATION: 100 % | WEIGHT: 172 LBS | HEART RATE: 88 BPM | HEIGHT: 68 IN | BODY MASS INDEX: 26.07 KG/M2 | SYSTOLIC BLOOD PRESSURE: 131 MMHG | DIASTOLIC BLOOD PRESSURE: 81 MMHG

## 2022-02-17 PROCEDURE — 99214 OFFICE O/P EST MOD 30 MIN: CPT

## 2022-02-17 RX ORDER — ALPRAZOLAM 0.25 MG/1
0.25 TABLET ORAL
Qty: 2 | Refills: 0 | Status: DISCONTINUED | COMMUNITY
Start: 2021-10-26 | End: 2022-02-17

## 2022-03-29 LAB
A-TUMOR NECROSIS FACT SERPL-MCNC: <1.7 PG/ML
ALBUMIN MFR SERPL ELPH: 64.4 %
ALBUMIN SERPL ELPH-MCNC: 4.7 G/DL
ALBUMIN SERPL-MCNC: 4.4 G/DL
ALBUMIN/GLOB SERPL: 1.8 RATIO
ALP BLD-CCNC: 106 U/L
ALPHA1 GLOB MFR SERPL ELPH: 4 %
ALPHA1 GLOB SERPL ELPH-MCNC: 0.3 G/DL
ALPHA2 GLOB MFR SERPL ELPH: 9.6 %
ALPHA2 GLOB SERPL ELPH-MCNC: 0.7 G/DL
ALT SERPL-CCNC: 23 U/L
ANA PAT FLD IF-IMP: ABNORMAL
ANACR T: ABNORMAL
ANION GAP SERPL CALC-SCNC: 13 MMOL/L
AST SERPL-CCNC: 24 U/L
B-GLOBULIN MFR SERPL ELPH: 9.4 %
B-GLOBULIN SERPL ELPH-MCNC: 0.6 G/DL
BASOPHILS # BLD AUTO: 0.03 K/UL
BASOPHILS NFR BLD AUTO: 0.6 %
BILIRUB SERPL-MCNC: 0.5 MG/DL
BUN SERPL-MCNC: 10 MG/DL
CALCIUM SERPL-MCNC: 9.9 MG/DL
CELLS.CD3-CD19+/CELLS IN BLOOD: <1 %
CHLORIDE SERPL-SCNC: 105 MMOL/L
CK SERPL-CCNC: 159 U/L
CO2 SERPL-SCNC: 26 MMOL/L
COVID-19 NUCLEOCAPSID  GAM ANTIBODY INTERPRETATION: NEGATIVE
COVID-19 SPIKE DOMAIN ANTIBODY INTERPRETATION: NEGATIVE
CREAT SERPL-MCNC: 0.69 MG/DL
CRP SERPL-MCNC: <3 MG/L
DEPRECATED KAPPA LC FREE/LAMBDA SER: 1.18 RATIO
EOSINOPHIL # BLD AUTO: 0.08 K/UL
EOSINOPHIL NFR BLD AUTO: 1.5 %
ERYTHROCYTE [SEDIMENTATION RATE] IN BLOOD BY WESTERGREN METHOD: 14 MM/HR
GAMMA GLOB FLD ELPH-MCNC: 0.9 G/DL
GAMMA GLOB MFR SERPL ELPH: 12.6 %
GLUCOSE SERPL-MCNC: 92 MG/DL
HCT VFR BLD CALC: 46 %
HGB BLD-MCNC: 14.4 G/DL
IGA SER QL IEP: 92 MG/DL
IGG SER QL IEP: 951 MG/DL
IGM SER QL IEP: 27 MG/DL
IGNF SERPL-MCNC: <4.2 PG/ML
IL10 SERPL-MCNC: <2.8 PG/ML
IL12 SERPL-MCNC: <1.9 PG/ML
IL13 SERPL-MCNC: <1.7 PG/ML
IL17A SERPL-MCNC: <1.4 PG/ML
IL2 SERPL-MCNC: 661.5 PG/ML
IL2 SERPL-MCNC: <2.1 PG/ML
IL4 SERPL-MCNC: <2.2 PG/ML
IL6 SERPL-MCNC: <2 PG/ML
IL8 SERPL-MCNC: <3 PG/ML
IMM GRANULOCYTES NFR BLD AUTO: 0.2 %
INTERLEUKIN 1 BETA: <6.5 PG/ML
INTERLEUKIN 5: <2.1 PG/ML
INTERPRETATION SERPL IEP-IMP: NORMAL
KAPPA LC CSF-MCNC: 1.04 MG/DL
KAPPA LC SERPL-MCNC: 1.23 MG/DL
LDH SERPL-CCNC: 210 U/L
LYMPHOCYTES # BLD AUTO: 1.76 K/UL
LYMPHOCYTES NFR BLD AUTO: 32.5 %
M PROTEIN SPEC IFE-MCNC: NORMAL
MAN DIFF?: NORMAL
MCHC RBC-ENTMCNC: 29.9 PG
MCHC RBC-ENTMCNC: 31.3 GM/DL
MCV RBC AUTO: 95.4 FL
MISCELLANEOUS TEST: NORMAL
MONOCYTES # BLD AUTO: 0.48 K/UL
MONOCYTES NFR BLD AUTO: 8.9 %
NEUTROPHILS # BLD AUTO: 3.05 K/UL
NEUTROPHILS NFR BLD AUTO: 56.3 %
PLATELET # BLD AUTO: 255 K/UL
POTASSIUM SERPL-SCNC: 4.6 MMOL/L
PROC NAME: NORMAL
PROT SERPL-MCNC: 6.9 G/DL
RBC # BLD: 4.82 M/UL
RBC # FLD: 13.5 %
SARS-COV-2 AB SERPL IA-ACNC: 0.4 U/ML
SARS-COV-2 AB SERPL QL IA: 0.07 INDEX
SODIUM SERPL-SCNC: 143 MMOL/L
TSH SERPL-ACNC: 2.03 UIU/ML
WBC # FLD AUTO: 5.41 K/UL

## 2022-04-18 ENCOUNTER — APPOINTMENT (OUTPATIENT)
Dept: RADIOLOGY | Facility: CLINIC | Age: 57
End: 2022-04-18
Payer: COMMERCIAL

## 2022-04-18 ENCOUNTER — OUTPATIENT (OUTPATIENT)
Dept: OUTPATIENT SERVICES | Facility: HOSPITAL | Age: 57
LOS: 1 days | End: 2022-04-18
Payer: COMMERCIAL

## 2022-04-18 ENCOUNTER — RESULT REVIEW (OUTPATIENT)
Age: 57
End: 2022-04-18

## 2022-04-18 DIAGNOSIS — Z98.890 OTHER SPECIFIED POSTPROCEDURAL STATES: Chronic | ICD-10-CM

## 2022-04-18 DIAGNOSIS — Z00.8 ENCOUNTER FOR OTHER GENERAL EXAMINATION: ICD-10-CM

## 2022-04-18 PROCEDURE — 77080 DXA BONE DENSITY AXIAL: CPT | Mod: 26

## 2022-04-18 PROCEDURE — 77080 DXA BONE DENSITY AXIAL: CPT

## 2022-05-14 ENCOUNTER — NON-APPOINTMENT (OUTPATIENT)
Age: 57
End: 2022-05-14

## 2022-05-31 ENCOUNTER — APPOINTMENT (OUTPATIENT)
Dept: RHEUMATOLOGY | Facility: CLINIC | Age: 57
End: 2022-05-31
Payer: COMMERCIAL

## 2022-05-31 VITALS
OXYGEN SATURATION: 99 % | DIASTOLIC BLOOD PRESSURE: 93 MMHG | HEART RATE: 81 BPM | RESPIRATION RATE: 18 BRPM | HEIGHT: 68 IN | SYSTOLIC BLOOD PRESSURE: 136 MMHG | BODY MASS INDEX: 26.22 KG/M2 | WEIGHT: 173 LBS

## 2022-05-31 DIAGNOSIS — R20.2 PARESTHESIA OF SKIN: ICD-10-CM

## 2022-05-31 PROCEDURE — 99214 OFFICE O/P EST MOD 30 MIN: CPT | Mod: 25

## 2022-05-31 RX ORDER — TOBRAMYCIN AND DEXAMETHASONE 3; 1 MG/ML; MG/ML
0.3-0.1 SUSPENSION/ DROPS OPHTHALMIC
Qty: 5 | Refills: 0 | Status: COMPLETED | COMMUNITY
Start: 2022-04-28

## 2022-05-31 RX ORDER — NIRMATRELVIR AND RITONAVIR 300-100 MG
20 X 150 MG & KIT ORAL
Qty: 30 | Refills: 0 | Status: COMPLETED | COMMUNITY
Start: 2022-05-15

## 2022-05-31 RX ORDER — GABAPENTIN 300 MG/1
300 CAPSULE ORAL 3 TIMES DAILY
Refills: 0 | Status: DISCONTINUED | COMMUNITY
End: 2022-05-31

## 2022-06-01 LAB
ALBUMIN SERPL ELPH-MCNC: 4.5 G/DL
ALP BLD-CCNC: 93 U/L
ALT SERPL-CCNC: 18 U/L
ANION GAP SERPL CALC-SCNC: 13 MMOL/L
AST SERPL-CCNC: 21 U/L
BASOPHILS # BLD AUTO: 0.03 K/UL
BASOPHILS NFR BLD AUTO: 0.5 %
BILIRUB SERPL-MCNC: 0.6 MG/DL
BUN SERPL-MCNC: 13 MG/DL
CALCIUM SERPL-MCNC: 9.5 MG/DL
CHLORIDE SERPL-SCNC: 103 MMOL/L
CO2 SERPL-SCNC: 26 MMOL/L
COVID-19 NUCLEOCAPSID  GAM ANTIBODY INTERPRETATION: NEGATIVE
COVID-19 SPIKE DOMAIN ANTIBODY INTERPRETATION: NEGATIVE
CREAT SERPL-MCNC: 0.66 MG/DL
CRP SERPL-MCNC: <3 MG/L
EGFR: 102 ML/MIN/1.73M2
EOSINOPHIL # BLD AUTO: 0.08 K/UL
EOSINOPHIL NFR BLD AUTO: 1.5 %
ERYTHROCYTE [SEDIMENTATION RATE] IN BLOOD BY WESTERGREN METHOD: 11 MM/HR
GLUCOSE SERPL-MCNC: 98 MG/DL
HCT VFR BLD CALC: 43.8 %
HGB BLD-MCNC: 13.8 G/DL
IMM GRANULOCYTES NFR BLD AUTO: 0.2 %
LYMPHOCYTES # BLD AUTO: 1.9 K/UL
LYMPHOCYTES NFR BLD AUTO: 34.7 %
MAN DIFF?: NORMAL
MCHC RBC-ENTMCNC: 30.1 PG
MCHC RBC-ENTMCNC: 31.5 GM/DL
MCV RBC AUTO: 95.4 FL
MONOCYTES # BLD AUTO: 0.44 K/UL
MONOCYTES NFR BLD AUTO: 8 %
NEUTROPHILS # BLD AUTO: 3.01 K/UL
NEUTROPHILS NFR BLD AUTO: 55.1 %
PLATELET # BLD AUTO: 229 K/UL
POTASSIUM SERPL-SCNC: 4.5 MMOL/L
PROT SERPL-MCNC: 6.6 G/DL
RBC # BLD: 4.59 M/UL
RBC # FLD: 13 %
SARS-COV-2 AB SERPL IA-ACNC: 0.4 U/ML
SARS-COV-2 AB SERPL QL IA: 0.12 INDEX
SODIUM SERPL-SCNC: 142 MMOL/L
WBC # FLD AUTO: 5.47 K/UL

## 2022-06-06 LAB — 14-3-3 ETA AG SER IA-MCNC: <0.2 NG/ML

## 2022-06-21 ENCOUNTER — APPOINTMENT (OUTPATIENT)
Dept: DERMATOLOGY | Facility: CLINIC | Age: 57
End: 2022-06-21
Payer: COMMERCIAL

## 2022-06-21 PROCEDURE — 99203 OFFICE O/P NEW LOW 30 MIN: CPT

## 2022-06-23 ENCOUNTER — RESULT REVIEW (OUTPATIENT)
Age: 57
End: 2022-06-23

## 2022-06-23 ENCOUNTER — APPOINTMENT (OUTPATIENT)
Dept: MAMMOGRAPHY | Facility: CLINIC | Age: 57
End: 2022-06-23

## 2022-06-23 ENCOUNTER — APPOINTMENT (OUTPATIENT)
Dept: ULTRASOUND IMAGING | Facility: CLINIC | Age: 57
End: 2022-06-23

## 2022-06-23 PROCEDURE — 77066 DX MAMMO INCL CAD BI: CPT

## 2022-06-23 PROCEDURE — 76641 ULTRASOUND BREAST COMPLETE: CPT | Mod: 50

## 2022-06-23 PROCEDURE — G0279: CPT

## 2022-06-28 ENCOUNTER — NON-APPOINTMENT (OUTPATIENT)
Age: 57
End: 2022-06-28

## 2022-07-01 ENCOUNTER — APPOINTMENT (OUTPATIENT)
Dept: BREAST CENTER | Facility: CLINIC | Age: 57
End: 2022-07-01

## 2022-07-01 VITALS
HEIGHT: 68 IN | DIASTOLIC BLOOD PRESSURE: 91 MMHG | SYSTOLIC BLOOD PRESSURE: 133 MMHG | HEART RATE: 89 BPM | BODY MASS INDEX: 26.22 KG/M2 | WEIGHT: 173 LBS

## 2022-07-01 PROCEDURE — 99213 OFFICE O/P EST LOW 20 MIN: CPT

## 2022-07-01 NOTE — PHYSICAL EXAM
[EOMI] : extra ocular movement intact [Sclera nonicteric] : sclera nonicteric [Supple] : supple [No Supraclavicular Adenopathy] : no supraclavicular adenopathy [No Cervical Adenopathy] : no cervical adenopathy [No Thyromegaly] : no thyromegaly [Clear to Auscultation Bilat] : clear to auscultation bilaterally [No dominant masses] : no dominant masses in right breast  [No dominant masses] : no dominant masses left breast [No Nipple Retraction] : no left nipple retraction [No Nipple Discharge] : no left nipple discharge [No Axillary Lymphadenopathy] : no left axillary lymphadenopathy [Soft] : abdomen soft [Not Tender] : non-tender [de-identified] : Circumareolar scar 3:00.

## 2022-07-01 NOTE — HISTORY OF PRESENT ILLNESS
[FreeTextEntry1] : This is a 57 year old  postmenopausal female who had noticed clear discharge on her nightgown, possibly from both breasts, for about 3 months in 2020. She then noticed brown discharge from her right nipple.\par \par An MRI showed abnormal retroareolar enhancing nodules in the right breast.  Right MRI core biopsy showed a papilloma.  She underwent Saviscout localized right breast biopsy on 10/20/20.  \par \par She started noticing discharge on her nightgown again last fall.  It was a pea sized spot that she saw in the morning.  She could not squeeze any discharge out. An MRI in October showed another abnormality in the right breast and an MRI biopsy was benign.\par \par She still see a clear spot intermittently.  Recent mammogram and ultrasound were normal.\par \par

## 2022-07-01 NOTE — DATA REVIEWED
[FreeTextEntry1] : Bilateral mammogram and breast ultrasound 6/23/202:  No mammographic or sonographic evidence of malignancy.\par \par \par Bilateral breast MRI 10/25/2021:  Multiple bilateral enhancing foci without change. 5 mm enhancing mass central retroareolar, rec biopsy, 1 cm nodule left medial breast c/w sebaceous cyst.\par \par \par (Images reviewed on PACS.)

## 2022-07-21 ENCOUNTER — APPOINTMENT (OUTPATIENT)
Dept: FAMILY MEDICINE | Facility: CLINIC | Age: 57
End: 2022-07-21

## 2022-11-15 ENCOUNTER — APPOINTMENT (OUTPATIENT)
Dept: MRI IMAGING | Facility: CLINIC | Age: 57
End: 2022-11-15

## 2022-11-15 ENCOUNTER — OUTPATIENT (OUTPATIENT)
Dept: OUTPATIENT SERVICES | Facility: HOSPITAL | Age: 57
LOS: 1 days | End: 2022-11-15
Payer: COMMERCIAL

## 2022-11-15 DIAGNOSIS — N64.52 NIPPLE DISCHARGE: ICD-10-CM

## 2022-11-15 DIAGNOSIS — Z98.890 OTHER SPECIFIED POSTPROCEDURAL STATES: Chronic | ICD-10-CM

## 2022-11-15 DIAGNOSIS — N60.19 DIFFUSE CYSTIC MASTOPATHY OF UNSPECIFIED BREAST: ICD-10-CM

## 2022-11-15 DIAGNOSIS — Z00.8 ENCOUNTER FOR OTHER GENERAL EXAMINATION: ICD-10-CM

## 2022-11-15 PROCEDURE — A9585: CPT

## 2022-11-15 PROCEDURE — C8908: CPT

## 2022-11-15 PROCEDURE — C8937: CPT

## 2022-11-15 PROCEDURE — 77049 MRI BREAST C-+ W/CAD BI: CPT | Mod: 26

## 2022-12-01 ENCOUNTER — LABORATORY RESULT (OUTPATIENT)
Age: 57
End: 2022-12-01

## 2022-12-01 ENCOUNTER — APPOINTMENT (OUTPATIENT)
Dept: RHEUMATOLOGY | Facility: CLINIC | Age: 57
End: 2022-12-01

## 2022-12-01 PROCEDURE — 99214 OFFICE O/P EST MOD 30 MIN: CPT | Mod: 25

## 2022-12-02 LAB
ALBUMIN SERPL ELPH-MCNC: 4.6 G/DL
ALP BLD-CCNC: 93 U/L
ALT SERPL-CCNC: 19 U/L
ANION GAP SERPL CALC-SCNC: 11 MMOL/L
AST SERPL-CCNC: 28 U/L
BASOPHILS # BLD AUTO: 0.03 K/UL
BASOPHILS NFR BLD AUTO: 0.5 %
BILIRUB SERPL-MCNC: 0.4 MG/DL
BUN SERPL-MCNC: 12 MG/DL
CALCIUM SERPL-MCNC: 9.4 MG/DL
CHLORIDE SERPL-SCNC: 104 MMOL/L
CO2 SERPL-SCNC: 28 MMOL/L
COVID-19 NUCLEOCAPSID  GAM ANTIBODY INTERPRETATION: NEGATIVE
COVID-19 SPIKE DOMAIN ANTIBODY INTERPRETATION: NEGATIVE
CREAT SERPL-MCNC: 0.66 MG/DL
CRP SERPL-MCNC: <3 MG/L
EGFR: 102 ML/MIN/1.73M2
EOSINOPHIL # BLD AUTO: 0.07 K/UL
EOSINOPHIL NFR BLD AUTO: 1.1 %
ERYTHROCYTE [SEDIMENTATION RATE] IN BLOOD BY WESTERGREN METHOD: 7 MM/HR
FOLATE SERPL-MCNC: >20 NG/ML
GLUCOSE SERPL-MCNC: 92 MG/DL
HAV IGM SER QL: NONREACTIVE
HBV CORE IGG+IGM SER QL: NONREACTIVE
HBV CORE IGM SER QL: NONREACTIVE
HBV SURFACE AG SER QL: NONREACTIVE
HCT VFR BLD CALC: 43.1 %
HCV AB SER QL: NONREACTIVE
HCV S/CO RATIO: 0.08 S/CO
HCYS SERPL-MCNC: 6.6 UMOL/L
HGB BLD-MCNC: 14.3 G/DL
IMM GRANULOCYTES NFR BLD AUTO: 0.2 %
LYMPHOCYTES # BLD AUTO: 1.76 K/UL
LYMPHOCYTES NFR BLD AUTO: 28.5 %
MAN DIFF?: NORMAL
MCHC RBC-ENTMCNC: 30.7 PG
MCHC RBC-ENTMCNC: 33.2 GM/DL
MCV RBC AUTO: 92.5 FL
MONOCYTES # BLD AUTO: 0.5 K/UL
MONOCYTES NFR BLD AUTO: 8.1 %
NEUTROPHILS # BLD AUTO: 3.81 K/UL
NEUTROPHILS NFR BLD AUTO: 61.6 %
PLATELET # BLD AUTO: 244 K/UL
POTASSIUM SERPL-SCNC: 4.3 MMOL/L
PROT SERPL-MCNC: 6.9 G/DL
RBC # BLD: 4.66 M/UL
RBC # FLD: 13.2 %
SARS-COV-2 AB SERPL IA-ACNC: 0.4 U/ML
SARS-COV-2 AB SERPL QL IA: 0.16 INDEX
SODIUM SERPL-SCNC: 142 MMOL/L
VIT B12 SERPL-MCNC: 1344 PG/ML
WBC # FLD AUTO: 6.18 K/UL

## 2022-12-04 LAB
M TB IFN-G BLD-IMP: NEGATIVE
QUANTIFERON TB PLUS MITOGEN MINUS NIL: 3.97 IU/ML
QUANTIFERON TB PLUS NIL: 0.01 IU/ML
QUANTIFERON TB PLUS TB1 MINUS NIL: 0 IU/ML
QUANTIFERON TB PLUS TB2 MINUS NIL: 0 IU/ML

## 2022-12-05 LAB
A/G RATIO: 1.5
ALBUMIN: 4 G/DL
ALPHA-1-GLOBULIN: 0.3 G/DL
ALPHA-2-GLOBULIN: 0.7 G/DL
BETA GLOBULIN: 0.9 G/DL
FREE KAPPA LT CHAINS,S: 13.5 MG/L
FREE LAMBDA LT CHAINS,S: 9.7 MG/L
GAMMA GLOBULIN: 0.9 G/DL
GLOBULIN, TOTAL: 2.8 G/DL
IGG SUBSET TOTAL IGG: 946 MG/DL
IMMUNOFIXATION RESULT, SERUM: NORMAL
IMMUNOGLOBULIN A, QN, SERUM: 76 MG/DL
IMMUNOGLOBULIN M, QN, SERUM: 28 MG/DL
KAPPA/LAMBDA RATIO,S: 1.39
Lab: NORMAL
M-SPIKE: NORMAL G/DL
PROTEIN, TOTAL: 6.8 G/DL

## 2023-01-20 ENCOUNTER — APPOINTMENT (OUTPATIENT)
Dept: PULMONOLOGY | Facility: CLINIC | Age: 58
End: 2023-01-20
Payer: COMMERCIAL

## 2023-01-20 ENCOUNTER — NON-APPOINTMENT (OUTPATIENT)
Age: 58
End: 2023-01-20

## 2023-01-20 VITALS
HEART RATE: 89 BPM | DIASTOLIC BLOOD PRESSURE: 74 MMHG | TEMPERATURE: 98.2 F | RESPIRATION RATE: 16 BRPM | OXYGEN SATURATION: 98 % | WEIGHT: 172 LBS | HEIGHT: 68 IN | BODY MASS INDEX: 26.07 KG/M2 | SYSTOLIC BLOOD PRESSURE: 126 MMHG

## 2023-01-20 DIAGNOSIS — Z78.9 OTHER SPECIFIED HEALTH STATUS: ICD-10-CM

## 2023-01-20 PROCEDURE — 99204 OFFICE O/P NEW MOD 45 MIN: CPT | Mod: 25

## 2023-01-20 PROCEDURE — 94618 PULMONARY STRESS TESTING: CPT

## 2023-01-20 PROCEDURE — 94010 BREATHING CAPACITY TEST: CPT

## 2023-01-20 PROCEDURE — 95012 NITRIC OXIDE EXP GAS DETER: CPT

## 2023-01-20 NOTE — REASON FOR VISIT
[Initial] : an initial visit [TextBox_44] :  SOB, ?abnormal chest x-ray, (moderate COPD), PND syndrome, snoring \par  SOB, ?abnormal chest x-ray, (moderate COPD), PND syndrome, snoring \par  SOB, ?abnormal chest x-ray, (moderate COPD), PND syndrome, snoring \par  SOB, ?abnormal chest x-ray, (moderate COPD), PND syndrome, snoring \par  SOB, ?abnormal chest x-ray, (moderate COPD), PND syndrome, snoring \par  SOB, SOB, ?abnormal chest x-ray, (moderate COPD), PND syndrome, snoring \par SOB ?abnormal chest x-ray, (moderate COPD), PND syndrome, snoring \par

## 2023-01-20 NOTE — HISTORY OF PRESENT ILLNESS
[TextBox_4] : Ms. CHOWDHURY is a 57 year old female presenting to the office today for initial pulmonary evaluation. Her chief complaint is-\par \par -she notes she coughs a lot in the morning \par -she notes going to her doctor and getting her X-ray done \par -she notes SOB on exertion \par -she notes no swallowing issues \par -she notes intermittent reflux \par -she notes she has PND \par -she notes sense of smelling and taste are okay \par -she notes her weight is stable \par -she notes her bowels are regular \par -she notes her balance is not good \par -she notes intermittent snoring \par -she notes good quality of sleep \par -she notes memory and concentration are good \par -she notes hoarseness in the morning sometimes  \par -she notes slightly yellow mucus \par -she notes neuropathy in her fingers \par -she notes arthritis and hip \par \par she denies any headaches, nausea, vomiting, fever, chills, sweats, chest pain, chest pressure, diarrhea, constipation, dysphagia, dizziness, leg swelling, leg pain, itchy eyes, itchy ears, heartburn, reflux, sour taste in the mouth, myalgias or arthralgias.

## 2023-01-20 NOTE — PROCEDURE
[FreeTextEntry1] : Chest X-ray (August 2020) revealed "Moderate COPD"\par \par Chest CT moderate hyper inflammation nasal pile sinus surgery\par \par PFT reveals normal flows, with an FEV1 of 3.22 L, which is 106% of predicted, with a normal flow volume loop \par \par 6 minute walk test reveals a low saturation of 95 % with very slight evidence of dyspnea or fatigue; walked 586.8 meters \par \par FENO was 27: a normal value being less than 25\par Fractional exhaled nitric oxide (FENO) is regarded as a simple, noninvasive method for assessing eosinophilic airway inflammation. Produced by a variety of cells within the lung, nitric oxide (NO) concentrations are generally low in healthy individuals. However, high concentrations of NO appear to be involved in nonspecific host defense mechanisms and chronic inflammatory diseases such as asthma. The American Thoracic Society (ATS) therefore has recommended using FENO to aid in the diagnosis and monitoring of eosinophilic airway inflammation and asthma, and for identifying steroid responsive individuals whose chronic respiratory symptoms may be caused by airway inflammation. \par

## 2023-01-20 NOTE — ADDENDUM
[FreeTextEntry1] : Documented by Fausto Stockton acting as a scribe for Dr. Dewayne Greenfield on (01/20/2023).\par \par All medical record entries made by the Scribe were at my, Dr. Dewayne Greenfield's, direction and personally dictated by me on (01/20/2023). I have reviewed the chart and agree that the record accurately reflects my personal performance of the history, physical exam, assessment and plan. I have personally directed, reviewed, and agree with the discharge instructions.

## 2023-01-20 NOTE — ASSESSMENT
[FreeTextEntry1] : Ms. CHOWDHURY is a 57 year year old female coming into the office today for an initial evaluation with a prior history of chronic inflammatory demyelinating polyradiculoneuropathy, nasal polyps sinus surgery, fibrocystic breast, papilloma, osteoarthritis, anterior uveitis and S/p Covid-19 (05/2022) who now comes in for a pulmonary evaluation for SOB, ?abnormal chest x-ray, (moderate COPD), PND syndrome, snoring \par \par The patient's SOB is felt to be multifactorial:\par - Overweight\par - Out-of Shape\par - Poor breathing mechanics \par - Pulmonary \par      - In doubting any current underling lung disease despite the x-ray reading\par - Cardiac \par \par Problem 1: In doubting any current underling lung disease despite the x-ray reading\par - Full pft to follow up \par - Get alpha 1 antitrypsin level \par \par Problem 2: PND syndrome (Prior nasal polyps and AM cough) \par - Add Olopatadine 0.6% 1 sniff each nostril twice a day \par \par Problem 3: snoring\par - no indication this time for sleep study \par \par Problem 4: Covid-19 05/2022\par - Recommended SaNotize \par - No additional vaccine \par \par Problem 5: chronic  demyelinating polyradiculoneuropathy\par -neuro renew and SPM \par \par Problem 6: Abnormal chest X-ray\par - Read as "moderate COPD" ,more then likely represents elevated lung capacity and not underlined COPD, no indication for a chest scan \par \par Problem : Cardiac\par -Recommended cardiac follow up evaluation with cardiologist if needed \par \par Problem : Overweight/out of shape\par - Weight loss, exercise, and diet control were discussed and are highly encouraged. Treatment options were given such as, aqua therapy, and contacting a nutritionist. Recommended to use the elliptical, stationary bike, less use of treadmill. Mindful eating was explained to the patient Obesity is associated with worsening asthma, shortness of breath, and potential for cardiac disease, diabetes, and other underlying medical conditions\par \par Problem : Poor Breathing Mechanics\par - Recommended Wim Hof and Buteyko breathing techniques \par - Proper breathing techniques were reviewed with an emphasis of exhalation. Patient instructed to breath in for 1 second and out for four seconds. Patient was encouraged to not talk while walking.\par \par Problem : Health maintenance\par -s/p flu shot\par -recommended strep pneumonia vaccines: Prevnar-20 vaccine, followed by Pneumo vaccine 23 one year following\par -recommended early intervention for URIs\par -recommended regular osteoporosis evaluations-recommended early dermatological evaluations\par -recommended after the age of 50 to the age of 70, colonoscopy every 5 years\par \par \par Follow up in 3-4 months\par pt is encouraged to call or fax the office with any questions or concerns.\par -Education provided to the PT regarding their visit and conditions.

## 2023-01-23 ENCOUNTER — APPOINTMENT (OUTPATIENT)
Dept: DERMATOLOGY | Facility: CLINIC | Age: 58
End: 2023-01-23
Payer: COMMERCIAL

## 2023-01-23 PROCEDURE — 99212 OFFICE O/P EST SF 10 MIN: CPT

## 2023-02-07 ENCOUNTER — APPOINTMENT (OUTPATIENT)
Dept: OBGYN | Facility: CLINIC | Age: 58
End: 2023-02-07
Payer: COMMERCIAL

## 2023-02-07 VITALS
HEART RATE: 83 BPM | BODY MASS INDEX: 26.83 KG/M2 | DIASTOLIC BLOOD PRESSURE: 85 MMHG | HEIGHT: 68 IN | SYSTOLIC BLOOD PRESSURE: 126 MMHG | WEIGHT: 177 LBS

## 2023-02-07 PROCEDURE — 99396 PREV VISIT EST AGE 40-64: CPT

## 2023-02-07 NOTE — PHYSICAL EXAM
[Chaperone Present] : A chaperone was present in the examining room during all aspects of the physical examination [Appropriately responsive] : appropriately responsive [Alert] : alert [No Acute Distress] : no acute distress [No Lymphadenopathy] : no lymphadenopathy [Regular Rate Rhythm] : regular rate rhythm [No Murmurs] : no murmurs [Clear to Auscultation B/L] : clear to auscultation bilaterally [Soft] : soft [Non-tender] : non-tender [Non-distended] : non-distended [No HSM] : No HSM [No Lesions] : no lesions [No Mass] : no mass [Oriented x3] : oriented x3 [FreeTextEntry6] : No masses, nontender, no skin changes, nipple discharge, no adenopathy. [Examination Of The Breasts] : a normal appearance [No Masses] : no breast masses were palpable [Labia Majora] : normal [Labia Minora] : normal [Normal] : normal [Tenderness] : nontender [Anteversion] : anteverted [Mass ___ cm] : no uterine mass was palpated [Uterine Adnexae] : normal

## 2023-02-07 NOTE — HISTORY OF PRESENT ILLNESS
[FreeTextEntry1] : Patient is a 57-year-old  4 para 2-0-2-2 last menstrual period 2019\par Patient presents for annual visit,,, denies any

## 2023-02-07 NOTE — PLAN
[FreeTextEntry1] : Patient is a 57-year-old  4 para 2-0-2-2 last menstrual period \par Patient presents for annual visit,,, denies any complaints\par Physical exam reveals a well-developed well-nourished female no apparent distress,,, BMI 27\par Heart regular rhythm and rate, lungs clear, breast no mass nontender no skin change or nipple discharge no adenopathy, abdomen soft nontender no organomegaly.\par Pelvic exam shows normal female external genitalia, vagina no lesions, cervix appropriate size nontender, uterus anteverted normal size nontender, adnexa no mass nontender.\par Pap smear was performed\par Patient states she had a mammogram and sonogram done in 2022 with negative findings\par Patient does state she had COVID diagnosis back in May of thousand 22,,, denies any residual symptoms or deficits,,, states she has been both vaccinated and boosted\par Essentially benign joint exam\par Follow-up 1 year prior to that as needed

## 2023-02-09 LAB — HPV HIGH+LOW RISK DNA PNL CVX: NOT DETECTED

## 2023-02-17 LAB — CYTOLOGY CVX/VAG DOC THIN PREP: ABNORMAL

## 2023-02-28 ENCOUNTER — LABORATORY RESULT (OUTPATIENT)
Age: 58
End: 2023-02-28

## 2023-02-28 ENCOUNTER — TRANSCRIPTION ENCOUNTER (OUTPATIENT)
Age: 58
End: 2023-02-28

## 2023-02-28 LAB
25(OH)D3 SERPL-MCNC: 48 NG/ML
A1AT SERPL-MCNC: 132 MG/DL
BASOPHILS # BLD AUTO: 0.04 K/UL
BASOPHILS NFR BLD AUTO: 0.9 %
EOSINOPHIL # BLD AUTO: 0.1 K/UL
EOSINOPHIL NFR BLD AUTO: 2.2 %
HCT VFR BLD CALC: 43.1 %
HGB BLD-MCNC: 14.2 G/DL
IMM GRANULOCYTES NFR BLD AUTO: 0.2 %
LYMPHOCYTES # BLD AUTO: 1.99 K/UL
LYMPHOCYTES NFR BLD AUTO: 43.6 %
MAN DIFF?: NORMAL
MCHC RBC-ENTMCNC: 30 PG
MCHC RBC-ENTMCNC: 32.9 GM/DL
MCV RBC AUTO: 90.9 FL
MONOCYTES # BLD AUTO: 0.41 K/UL
MONOCYTES NFR BLD AUTO: 9 %
NEUTROPHILS # BLD AUTO: 2.01 K/UL
NEUTROPHILS NFR BLD AUTO: 44.1 %
PLATELET # BLD AUTO: 230 K/UL
RBC # BLD: 4.74 M/UL
RBC # FLD: 12.8 %
WBC # FLD AUTO: 4.56 K/UL

## 2023-03-01 LAB — 24R-OH-CALCIDIOL SERPL-MCNC: 44.7 PG/ML

## 2023-03-02 LAB
A ALTERNATA IGE QN: <0.1 KUA/L
A ALTERNATA IGE QN: <0.1 KUA/L
A FUMIGATUS IGE QN: <0.1 KUA/L
A FUMIGATUS IGE QN: <0.1 KUA/L
BERMUDA GRASS IGE QN: <0.1 KUA/L
BOXELDER IGE QN: <0.1 KUA/L
C ALBICANS IGE QN: <0.1 KUA/L
C HERBARUM IGE QN: <0.1 KUA/L
C HERBARUM IGE QN: <0.1 KUA/L
CALIF WALNUT IGE QN: <0.1 KUA/L
CAT DANDER IGE QN: <0.1 KUA/L
CAT DANDER IGE QN: <0.1 KUA/L
CLAM IGE QN: <0.1 KUA/L
CMN PIGWEED IGE QN: <0.1 KUA/L
CODFISH IGE QN: <0.1 KUA/L
COMMON RAGWEED IGE QN: <0.1 KUA/L
COMMON RAGWEED IGE QN: <0.1 KUA/L
CORN IGE QN: <0.1 KUA/L
COTTONWOOD IGE QN: <0.1 KUA/L
COW MILK IGE QN: <0.1 KUA/L
D FARINAE IGE QN: <0.1 KUA/L
D FARINAE IGE QN: <0.1 KUA/L
D PTERONYSS IGE QN: <0.1 KUA/L
D PTERONYSS IGE QN: <0.1 KUA/L
DEPRECATED A ALTERNATA IGE RAST QL: 0
DEPRECATED A ALTERNATA IGE RAST QL: 0
DEPRECATED A FUMIGATUS IGE RAST QL: 0
DEPRECATED A FUMIGATUS IGE RAST QL: 0
DEPRECATED BERMUDA GRASS IGE RAST QL: 0
DEPRECATED BOXELDER IGE RAST QL: 0
DEPRECATED C ALBICANS IGE RAST QL: 0
DEPRECATED C HERBARUM IGE RAST QL: 0
DEPRECATED C HERBARUM IGE RAST QL: 0
DEPRECATED CAT DANDER IGE RAST QL: 0
DEPRECATED CAT DANDER IGE RAST QL: 0
DEPRECATED CLAM IGE RAST QL: 0
DEPRECATED CODFISH IGE RAST QL: 0
DEPRECATED COMMON PIGWEED IGE RAST QL: 0
DEPRECATED COMMON RAGWEED IGE RAST QL: 0
DEPRECATED COMMON RAGWEED IGE RAST QL: 0
DEPRECATED CORN IGE RAST QL: 0
DEPRECATED COTTONWOOD IGE RAST QL: 0
DEPRECATED COW MILK IGE RAST QL: 0
DEPRECATED D FARINAE IGE RAST QL: 0
DEPRECATED D FARINAE IGE RAST QL: 0
DEPRECATED D PTERONYSS IGE RAST QL: 0
DEPRECATED D PTERONYSS IGE RAST QL: 0
DEPRECATED DOG DANDER IGE RAST QL: 0
DEPRECATED DOG DANDER IGE RAST QL: 0
DEPRECATED DUCK FEATHER IGE RAST QL: 0
DEPRECATED EGG WHITE IGE RAST QL: 0
DEPRECATED GOOSE FEATHER IGE RAST QL: 0
DEPRECATED GOOSEFOOT IGE RAST QL: 0
DEPRECATED LONDON PLANE IGE RAST QL: 0
DEPRECATED M RACEMOSUS IGE RAST QL: 0
DEPRECATED MOUSE URINE PROT IGE RAST QL: 0
DEPRECATED MUGWORT IGE RAST QL: 0
DEPRECATED P NOTATUM IGE RAST QL: 0
DEPRECATED PEANUT IGE RAST QL: 0
DEPRECATED RED CEDAR IGE RAST QL: 0
DEPRECATED ROACH IGE RAST QL: 0
DEPRECATED ROACH IGE RAST QL: 0
DEPRECATED SCALLOP IGE RAST QL: <0.1 KUA/L
DEPRECATED SESAME SEED IGE RAST QL: 0
DEPRECATED SHEEP SORREL IGE RAST QL: 0
DEPRECATED SHRIMP IGE RAST QL: 0
DEPRECATED SILVER BIRCH IGE RAST QL: 0
DEPRECATED SOYBEAN IGE RAST QL: 0
DEPRECATED TIMOTHY IGE RAST QL: 0
DEPRECATED TIMOTHY IGE RAST QL: 0
DEPRECATED WALNUT IGE RAST QL: 0
DEPRECATED WHEAT IGE RAST QL: 0
DEPRECATED WHITE ASH IGE RAST QL: 0
DEPRECATED WHITE OAK IGE RAST QL: 0
DEPRECATED WHITE OAK IGE RAST QL: 0
DOG DANDER IGE QN: <0.1 KUA/L
DOG DANDER IGE QN: <0.1 KUA/L
DUCK FEATHER IGE QN: <0.1 KUA/L
EGG WHITE IGE QN: <0.1 KUA/L
GOOSE FEATHER IGE QN: <0.1 KUA/L
GOOSEFOOT IGE QN: <0.1 KUA/L
LONDON PLANE IGE QN: <0.1 KUA/L
M RACEMOSUS IGE QN: <0.1 KUA/L
MOUSE URINE PROT IGE QN: <0.1 KUA/L
MUGWORT IGE QN: <0.1 KUA/L
MULBERRY (T70) CLASS: 0
MULBERRY (T70) CONC: <0.1 KUA/L
P NOTATUM IGE QN: <0.1 KUA/L
PEANUT IGE QN: <0.1 KUA/L
RED CEDAR IGE QN: <0.1 KUA/L
ROACH IGE QN: <0.1 KUA/L
ROACH IGE QN: <0.1 KUA/L
SCALLOP IGE QN: 0
SCALLOP IGE QN: <0.1 KUA/L
SESAME SEED IGE QN: <0.1 KUA/L
SHEEP SORREL IGE QN: <0.1 KUA/L
SILVER BIRCH IGE QN: <0.1 KUA/L
SOYBEAN IGE QN: <0.1 KUA/L
TIMOTHY IGE QN: <0.1 KUA/L
TIMOTHY IGE QN: <0.1 KUA/L
TOTAL IGE SMQN RAST: 12 KU/L
TREE ALLERG MIX1 IGE QL: 0
WALNUT IGE QN: <0.1 KUA/L
WHEAT IGE QN: <0.1 KUA/L
WHITE ASH IGE QN: <0.1 KUA/L
WHITE ELM IGE QN: 0
WHITE ELM IGE QN: <0.1 KUA/L
WHITE OAK IGE QN: <0.1 KUA/L
WHITE OAK IGE QN: <0.1 KUA/L

## 2023-03-03 ENCOUNTER — TRANSCRIPTION ENCOUNTER (OUTPATIENT)
Age: 58
End: 2023-03-03

## 2023-03-06 LAB
A1AT PHENOTYP SERPL-IMP: NORMAL
A1AT SERPL-MCNC: 135 MG/DL

## 2023-03-07 ENCOUNTER — TRANSCRIPTION ENCOUNTER (OUTPATIENT)
Age: 58
End: 2023-03-07

## 2023-03-15 ENCOUNTER — APPOINTMENT (OUTPATIENT)
Dept: FAMILY MEDICINE | Facility: CLINIC | Age: 58
End: 2023-03-15

## 2023-03-17 LAB
ANNOTATION COMMENT IMP: NORMAL
ELECTRONIC SIGNATURE: NORMAL
SERPINA1 GENE MUT TESTED BLD/T: NORMAL

## 2023-03-24 ENCOUNTER — APPOINTMENT (OUTPATIENT)
Dept: PULMONOLOGY | Facility: CLINIC | Age: 58
End: 2023-03-24
Payer: COMMERCIAL

## 2023-03-24 VITALS
HEART RATE: 91 BPM | RESPIRATION RATE: 16 BRPM | DIASTOLIC BLOOD PRESSURE: 80 MMHG | BODY MASS INDEX: 26.22 KG/M2 | OXYGEN SATURATION: 98 % | WEIGHT: 173 LBS | HEIGHT: 68 IN | SYSTOLIC BLOOD PRESSURE: 130 MMHG | TEMPERATURE: 97.7 F

## 2023-03-24 PROCEDURE — 94727 GAS DIL/WSHOT DETER LNG VOL: CPT

## 2023-03-24 PROCEDURE — 99214 OFFICE O/P EST MOD 30 MIN: CPT | Mod: 25

## 2023-03-24 PROCEDURE — 94010 BREATHING CAPACITY TEST: CPT

## 2023-03-24 PROCEDURE — 94729 DIFFUSING CAPACITY: CPT

## 2023-03-24 PROCEDURE — 95012 NITRIC OXIDE EXP GAS DETER: CPT

## 2023-03-24 NOTE — ASSESSMENT
[FreeTextEntry1] : Ms. CHOWDHURY is a 57 year year old female coming into the office today for an initial evaluation with a prior history of chronic inflammatory demyelinating polyradiculoneuropathy, nasal polyps sinus surgery, fibrocystic breast, papilloma, osteoarthritis, anterior uveitis and S/p Covid-19 (05/2022) who now comes in for a f/p pulmonary evaluation for SOB, ?abnormal chest x-ray, (moderate COPD), PND syndrome, snoring - stable\par \par The patient's SOB is felt to be multifactorial:\par - Overweight\par - Out-of Shape\par - Poor breathing mechanics \par - Pulmonary \par      - In doubting any current underling lung disease despite the x-ray reading\par - Cardiac \par \par Problem 1: In doubting any current underling lung disease despite the x-ray reading\par - Full pft wnl\par - s/p alpha 1 antitrypsin level wnl\par \par Problem 2: PND syndrome (Prior nasal polyps and AM cough) \par - Add Olopatadine 0.6% 1 sniff each nostril twice a day \par -Environmental measures for allergies were encouraged including mattress and pillow covers, air purifier, and environmental controls. \par \par Problem 3: snoring\par - no indication this time for sleep study \par \par Problem 4: Covid-19 05/2022\par - Recommended SaNotize \par - No additional vaccine \par \par Problem 5: chronic  demyelinating polyradiculoneuropathy\par -neuro renew and SPM \par -recommended neurologic f/p\par -recommended rheumatologic f/p\par \par Problem 6: Abnormal chest X-ray\par - Read as "moderate COPD" ,more then likely represents elevated lung capacity and not underlined COPD, no indication for a chest scan \par \par Problem 7: Cardiac\par -Recommended cardiac follow up evaluation with cardiologist if needed \par \par Problem 8: Overweight/out of shape\par - Weight loss, exercise, and diet control were discussed and are highly encouraged. Treatment options were given such as, aqua therapy, and contacting a nutritionist. Recommended to use the elliptical, stationary bike, less use of treadmill. Mindful eating was explained to the patient Obesity is associated with worsening asthma, shortness of breath, and potential for cardiac disease, diabetes, and other underlying medical conditions\par \par Problem 9: Poor Breathing Mechanics\par - Recommended Wim Hof and Buteyko breathing techniques \par - Proper breathing techniques were reviewed with an emphasis of exhalation. Patient instructed to breath in for 1 second and out for four seconds. Patient was encouraged to not talk while walking.\par \par Problem 10: Health maintenance\par -recommended far infrared sauna\par -recommended Epsom salt bath\par -s/p flu shot\par -recommended strep pneumonia vaccines: Prevnar-20 vaccine, followed by Pneumo vaccine 23 one year following\par -recommended early intervention for URIs\par -recommended regular osteoporosis evaluations-recommended early dermatological evaluations\par -recommended after the age of 50 to the age of 70, colonoscopy every 5 years\par \par \par Follow up in 3-4 months\par pt is encouraged to call or fax the office with any questions or concerns.\par -Education provided to the PT regarding their visit and conditions.

## 2023-03-24 NOTE — REASON FOR VISIT
[Follow-Up] : a follow-up visit [TextBox_44] :  SOB, ?abnormal chest x-ray, (moderate COPD), PND syndrome, snoring

## 2023-03-24 NOTE — ADDENDUM
[FreeTextEntry1] : Documented by RITA Melchor acting as a scribe for Dr. Dewayne Greenfield on 03/24/2023 .\par \par All medical record entries made by the Scribe were at my, Dr. Dewayne Greenfield's, direction and personally dictated by me on 03/24/2023. I have reviewed the chart and agree that the record accurately reflects my personal performance of the history, physical exam, assessment and plan. I have also personally directed, reviewed, and agree with the discharge instructions.

## 2023-03-24 NOTE — HISTORY OF PRESENT ILLNESS
[TextBox_4] : Ms. CHOWDHURY is a 57 year old female presenting to the office today for f/p pulmonary evaluation. Her chief complaint is-\par \par -she notes she will begin a plant based diet\par -she notes her condition is stable\par -she notes bowels are regular \par -she notes sleeping for 8hrs\par -she notes good quality of sleep \par -she notes exercising (5 hrs a week)\par -she notes weight is stable \par -she notes muscle weakness persists\par -she notes inability to lift 15lb weight or run due to muscle weakness\par -she notes PND\par \par -she denies any headaches, nausea, emesis, fever, chills, sweats, chest pain, chest pressure, coughing, wheezing, palpitations, constipation, diarrhea, vertigo, dysphagia, heartburn, reflux, itchy eyes, itchy ears, leg swelling, arthralgias, myalgias, or sour taste in the mouth.

## 2023-03-24 NOTE — PROCEDURE
[FreeTextEntry1] : Full PFT reveals normal flows; FEV1 was  3.35L which is 118% of predicted; normal lung volumes; normal diffusion at 31.9, which is 135% of predicted; normal flow volume loop. JOURDAN test revealed normal PI max which was 93% of predicted and moderately reduced PE max which was 38% of predicted\par \par FENO was 24; a normal value being less than 25\par Fractional exhaled nitric oxide (FENO) is regarded as a simple, noninvasive method for assessing eosinophilic airway inflammation. Produced by a variety of cells within the lung, nitric oxide (NO) concentrations are generally low in healthy individuals. However, high concentrations of NO appear to be involved in nonspecific host defense mechanisms and chronic inflammatory diseases such as asthma. The American Thoracic Society (ATS) therefore has recommended using FENO to aid in the diagnosis and monitoring of eosinophilic airway inflammation and asthma, and for identifying steroid responsive individuals whose chronic respiratory symptoms may be caused by airway inflammation.

## 2023-03-27 ENCOUNTER — APPOINTMENT (OUTPATIENT)
Dept: PULMONOLOGY | Facility: CLINIC | Age: 58
End: 2023-03-27

## 2023-03-28 ENCOUNTER — OUTPATIENT (OUTPATIENT)
Dept: OUTPATIENT SERVICES | Facility: HOSPITAL | Age: 58
LOS: 1 days | End: 2023-03-28
Payer: COMMERCIAL

## 2023-03-28 ENCOUNTER — APPOINTMENT (OUTPATIENT)
Dept: MRI IMAGING | Facility: CLINIC | Age: 58
End: 2023-03-28
Payer: COMMERCIAL

## 2023-03-28 DIAGNOSIS — Z00.8 ENCOUNTER FOR OTHER GENERAL EXAMINATION: ICD-10-CM

## 2023-03-28 DIAGNOSIS — Z98.890 OTHER SPECIFIED POSTPROCEDURAL STATES: Chronic | ICD-10-CM

## 2023-03-28 PROCEDURE — 70551 MRI BRAIN STEM W/O DYE: CPT

## 2023-03-28 PROCEDURE — 70551 MRI BRAIN STEM W/O DYE: CPT | Mod: 26

## 2023-03-28 NOTE — ASU PATIENT PROFILE, ADULT - SURGICAL SITE INCISION
Lab Results   Component Value Date    CHOL 128 12/13/2021    CHOL 129 04/27/2020    TRIG 179 (H) 12/13/2021    TRIG 128 04/27/2020    HDL 37 (L) 12/13/2021    HDL 34 (L) 04/27/2020    LDLCALC 55.2 (L) 12/13/2021    LDLCALC 69.4 04/27/2020    NONHDLCHOL 91 12/13/2021    NONHDLCHOL 95 04/27/2020    AST 10 02/23/2023    ALT 10 02/23/2023     The ASCVD Risk score (Jaison DK, et al., 2019) failed to calculate for the following reasons:    The valid total cholesterol range is 130 to 320 mg/dL    no

## 2023-04-03 ENCOUNTER — LABORATORY RESULT (OUTPATIENT)
Age: 58
End: 2023-04-03

## 2023-04-03 ENCOUNTER — APPOINTMENT (OUTPATIENT)
Dept: RHEUMATOLOGY | Facility: CLINIC | Age: 58
End: 2023-04-03
Payer: COMMERCIAL

## 2023-04-03 VITALS
HEIGHT: 68 IN | DIASTOLIC BLOOD PRESSURE: 79 MMHG | BODY MASS INDEX: 26.22 KG/M2 | HEART RATE: 82 BPM | WEIGHT: 173 LBS | OXYGEN SATURATION: 100 % | RESPIRATION RATE: 18 BRPM | SYSTOLIC BLOOD PRESSURE: 134 MMHG

## 2023-04-03 DIAGNOSIS — H20.9 UNSPECIFIED IRIDOCYCLITIS: ICD-10-CM

## 2023-04-03 PROCEDURE — 36415 COLL VENOUS BLD VENIPUNCTURE: CPT

## 2023-04-03 PROCEDURE — 99214 OFFICE O/P EST MOD 30 MIN: CPT | Mod: 25

## 2023-04-04 PROBLEM — H20.9 ANTERIOR UVEITIS: Status: ACTIVE | Noted: 2020-06-26

## 2023-04-04 LAB
ALBUMIN SERPL ELPH-MCNC: 4.7 G/DL
ALP BLD-CCNC: 101 U/L
ALT SERPL-CCNC: 21 U/L
ANION GAP SERPL CALC-SCNC: 12 MMOL/L
AST SERPL-CCNC: 22 U/L
BASOPHILS # BLD AUTO: 0.03 K/UL
BASOPHILS NFR BLD AUTO: 0.5 %
BILIRUB SERPL-MCNC: 0.5 MG/DL
BUN SERPL-MCNC: 12 MG/DL
CALCIUM SERPL-MCNC: 9.7 MG/DL
CHLORIDE SERPL-SCNC: 103 MMOL/L
CHOLEST SERPL-MCNC: 243 MG/DL
CO2 SERPL-SCNC: 26 MMOL/L
CREAT SERPL-MCNC: 0.67 MG/DL
CRP SERPL-MCNC: <3 MG/L
DEPRECATED KAPPA LC FREE/LAMBDA SER: 1.48 RATIO
EGFR: 102 ML/MIN/1.73M2
EOSINOPHIL # BLD AUTO: 0.07 K/UL
EOSINOPHIL NFR BLD AUTO: 1.2 %
ERYTHROCYTE [SEDIMENTATION RATE] IN BLOOD BY WESTERGREN METHOD: 14 MM/HR
GLUCOSE SERPL-MCNC: 86 MG/DL
HAV IGM SER QL: NONREACTIVE
HBV CORE IGG+IGM SER QL: NONREACTIVE
HBV CORE IGM SER QL: NONREACTIVE
HBV SURFACE AG SER QL: NONREACTIVE
HCT VFR BLD CALC: 44.3 %
HCV AB SER QL: NONREACTIVE
HCV S/CO RATIO: 0.08 S/CO
HDLC SERPL-MCNC: 99 MG/DL
HGB BLD-MCNC: 14.5 G/DL
IGA SER QL IEP: 90 MG/DL
IGG SER QL IEP: 955 MG/DL
IGM SER QL IEP: 32 MG/DL
IMM GRANULOCYTES NFR BLD AUTO: 0.2 %
KAPPA LC CSF-MCNC: 1.03 MG/DL
KAPPA LC SERPL-MCNC: 1.52 MG/DL
LDLC SERPL CALC-MCNC: 132 MG/DL
LYMPHOCYTES # BLD AUTO: 2.21 K/UL
LYMPHOCYTES NFR BLD AUTO: 37 %
MAN DIFF?: NORMAL
MCHC RBC-ENTMCNC: 30 PG
MCHC RBC-ENTMCNC: 32.7 GM/DL
MCV RBC AUTO: 91.7 FL
MONOCYTES # BLD AUTO: 0.4 K/UL
MONOCYTES NFR BLD AUTO: 6.7 %
NEUTROPHILS # BLD AUTO: 3.25 K/UL
NEUTROPHILS NFR BLD AUTO: 54.4 %
NONHDLC SERPL-MCNC: 144 MG/DL
PLATELET # BLD AUTO: 253 K/UL
POTASSIUM SERPL-SCNC: 4.2 MMOL/L
PROT SERPL-MCNC: 6.7 G/DL
RBC # BLD: 4.83 M/UL
RBC # FLD: 13 %
SODIUM SERPL-SCNC: 140 MMOL/L
TRIGL SERPL-MCNC: 59 MG/DL
WBC # FLD AUTO: 5.97 K/UL

## 2023-04-04 NOTE — REASON FOR VISIT
[Follow-Up: _____] : a [unfilled] follow-up visit [Family Member] : family member [FreeTextEntry1] : + inflammatory arthritis, ?CIDP/MS/neuropathy, h/o +MADHURI, uveitis, R hip OA, RTX therapy.

## 2023-04-04 NOTE — REVIEW OF SYSTEMS
[Feeling Tired] : feeling tired [Arthralgias] : arthralgias [Joint Pain] : joint pain [As Noted in HPI] : as noted in HPI [Negative] : Heme/Lymph

## 2023-04-04 NOTE — PHYSICAL EXAM
[General Appearance - Alert] : alert [General Appearance - In No Acute Distress] : in no acute distress [General Appearance - Well Nourished] : well nourished [Sclera] : the sclera and conjunctiva were normal [Extraocular Movements] : extraocular movements were intact [PERRL With Normal Accommodation] : pupils were equal in size, round, and reactive to light [Outer Ear] : the ears and nose were normal in appearance [Nasal Cavity] : the nasal mucosa and septum were normal [Oropharynx] : the oropharynx was normal [Neck Appearance] : the appearance of the neck was normal [Auscultation Breath Sounds / Voice Sounds] : lungs were clear to auscultation bilaterally [Heart Rate And Rhythm] : heart rate was normal and rhythm regular [Heart Sounds] : normal S1 and S2 [Edema] : there was no peripheral edema [Cervical Lymph Nodes Enlarged Anterior Bilaterally] : anterior cervical [Supraclavicular Lymph Nodes Enlarged Bilaterally] : supraclavicular [No Spinal Tenderness] : no spinal tenderness [Nail Clubbing] : no clubbing  or cyanosis of the fingernails [Abnormal Walk] : normal gait [Musculoskeletal - Swelling] : no joint swelling seen [FreeTextEntry1] : No synovitis, effusions, contractures. L knee bony enlargement with crepitus, marked pain and marked limitation to R hip ROM  [Motor Tone] : muscle strength and tone were normal [] : no rash [No Focal Deficits] : no focal deficits [Oriented To Time, Place, And Person] : oriented to person, place, and time [Impaired Insight] : insight and judgment were intact [Affect] : the affect was normal

## 2023-04-04 NOTE — HISTORY OF PRESENT ILLNESS
[___ Month(s) Ago] : [unfilled] month(s) ago [FreeTextEntry1] : - Pt overall doing ok, no new complaints. Reports no new joint issues, no inflammatory arthritis. Denies any changes in paresthesia, motor weakness, sensory changes, vision changes or eye pain, redness, tenderness, photosensitivity. Denies any new GI symptoms, inflammatory back pain, rash/psoriasis, etc\par - notes has been seeing Dr. Mcallister but has not had any changes in medication or care for quite some time. She has seen a second opinoin neurologist and plans to follow w/ them. They reportedly have ability for infusions as well. Pt is discontent w/ current level of care and would like to try to taper off RTX if possible. I discussed we can follow along and map her CD19 levels to ensure she remains w/ level of immunosuppression and she may only require infusion once yearly or other intervals. She would like to try to taper if possible since she has not had any symptom changes for years currently\par - last RTX 1/2023 and planned again 6/2023 but will likely be with new neurologist by that time\par - DEXA UTD 4/2022: osteopenia, remains on on Vit D and Ca supp daily\par - Previous lab results were discussed, showed no abnormal findings. \par - UTD COVID booster\par - Will check labs today for disease monitoring and continue RTX as discussed - can plan to delay next infusion to 8-9 months or up tot 12 with closer monitoring of CD19 and Ig levels\par - ROS remains unchanged otherwise.

## 2023-04-04 NOTE — ASSESSMENT
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 + FH of Crohns, personal hx of + ASCA and hepatic cysts. + R hip OA, L knee OA.  ?CIDP vs TM vs idiopathic neuropathy dx.\par Pt has been on long-term RTX and wants to try to taper dosing from q6 months. Has had infusion at 8 month shayan due to delays w/o changes. No active neurological or rheumatic sxs over the last years. Denies uveitis, changes in neuro weakness, sensory changes, vision changes, or changes in neuropathic sxs. Will plan to defer infusion by 3 months to 9 month shayan if ok w/ neurology and monitor\par \par - Previous and recent labs reviewed \par - Disease monitoring drawn in office today w/ CBC, CMP, ESR, CRP, cytokine profile, CD19%, B12, etc.  including covid Abs given poor response in some pt's on Rituxan as well as Ig levels.  Yearly Screening w/ HBV, HCV, QTB \par - s/p Rituxan, last dose on 7/5/2022 with continued improvements. Planning next dose 01-03/2023. Remains with paucity of lupus, myositis, or scleroderma features necessitating treatment and Rituxan can help with any CTD sx as well, we will continue with clinical surveillance. Last Echo was normal. \par - c/w Calcium 500mg BID w/ meals. c/w Vitamin D 2000 UT daily. \par - c/w regular optho, cardio, neuro f/u  \par - next DEXA 4/2024 due.\par \par RTO Sept 2023

## 2023-04-06 LAB
A-TUMOR NECROSIS FACT SERPL-MCNC: <1.7 PG/ML
IGNF SERPL-MCNC: <4.2 PG/ML
IL10 SERPL-MCNC: <2.8 PG/ML
IL12 SERPL-MCNC: <1.9 PG/ML
IL13 SERPL-MCNC: <1.7 PG/ML
IL17A SERPL-MCNC: <1.4 PG/ML
IL2 SERPL-MCNC: 1298.5 PG/ML
IL2 SERPL-MCNC: <2.1 PG/ML
IL4 SERPL-MCNC: <2.2 PG/ML
IL6 SERPL-MCNC: <2 PG/ML
IL8 SERPL-MCNC: <3 PG/ML
INTERLEUKIN 1 BETA: <6.5 PG/ML
INTERLEUKIN 5: <2.1 PG/ML

## 2023-04-07 LAB
M TB IFN-G BLD-IMP: NEGATIVE
QUANTIFERON TB PLUS MITOGEN MINUS NIL: 3.07 IU/ML
QUANTIFERON TB PLUS NIL: 0.02 IU/ML
QUANTIFERON TB PLUS TB1 MINUS NIL: 0 IU/ML
QUANTIFERON TB PLUS TB2 MINUS NIL: 0 IU/ML

## 2023-04-11 LAB — 14-3-3 ETA AG SER IA-MCNC: <0.2 NG/ML

## 2023-05-16 ENCOUNTER — APPOINTMENT (OUTPATIENT)
Dept: NEUROLOGY | Facility: CLINIC | Age: 58
End: 2023-05-16
Payer: COMMERCIAL

## 2023-05-16 VITALS
DIASTOLIC BLOOD PRESSURE: 92 MMHG | TEMPERATURE: 97.8 F | BODY MASS INDEX: 26.52 KG/M2 | HEART RATE: 85 BPM | WEIGHT: 175 LBS | SYSTOLIC BLOOD PRESSURE: 134 MMHG | HEIGHT: 68 IN

## 2023-05-16 DIAGNOSIS — G37.9 DEMYELINATING DISEASE OF CENTRAL NERVOUS SYSTEM, UNSPECIFIED: ICD-10-CM

## 2023-05-16 DIAGNOSIS — R20.2 PARESTHESIA OF SKIN: ICD-10-CM

## 2023-05-16 DIAGNOSIS — G60.9 HEREDITARY AND IDIOPATHIC NEUROPATHY, UNSPECIFIED: ICD-10-CM

## 2023-05-16 DIAGNOSIS — M79.601 PARESTHESIA OF SKIN: ICD-10-CM

## 2023-05-16 DIAGNOSIS — M79.602 PARESTHESIA OF SKIN: ICD-10-CM

## 2023-05-16 PROCEDURE — 99215 OFFICE O/P EST HI 40 MIN: CPT

## 2023-05-16 NOTE — PHYSICAL EXAM
[General Appearance - Alert] : alert [General Appearance - In No Acute Distress] : in no acute distress [Impaired Insight] : insight and judgment were intact [Mood] : the mood was normal [Person] : oriented to person [Place] : oriented to place [Time] : oriented to time [Registration Intact] : recent registration memory intact [Concentration Intact] : normal concentrating ability [Naming Objects] : no difficulty naming common objects [Repeating Phrases] : no difficulty repeating a phrase [Fluency] : fluency intact [Comprehension] : comprehension intact [Current Events] : adequate knowledge of current events [Cranial Nerves Optic (II)] : visual acuity intact bilaterally,  visual fields full to confrontation, pupils equal round and reactive to light [Cranial Nerves Oculomotor (III)] : extraocular motion intact [Cranial Nerves Trigeminal (V)] : facial sensation intact symmetrically [Cranial Nerves Facial (VII)] : face symmetrical [Cranial Nerves Vestibulocochlear (VIII)] : hearing was intact bilaterally [Cranial Nerves Glossopharyngeal (IX)] : tongue and palate midline [Cranial Nerves Accessory (XI - Cranial And Spinal)] : head turning and shoulder shrug symmetric [Cranial Nerves Hypoglossal (XII)] : there was no tongue deviation with protrusion [Motor Tone] : muscle tone was normal in all four extremities [Motor Strength] : muscle strength was normal in all four extremities [No Muscle Atrophy] : normal bulk in all four extremities [Sensation Tactile Decrease] : light touch was intact [Proprioception] : proprioception was intact [0] : Ankle jerk left 0 [PERRL With Normal Accommodation] : pupils were equal in size, round, reactive to light, with normal accommodation [Extraocular Movements] : extraocular movements were intact [Full Visual Field] : full visual field [Hearing Threshold Finger Rub Not Alcorn] : hearing was normal [Neck Cervical Mass (___cm)] : no neck mass was observed [Auscultation Breath Sounds / Voice Sounds] : lungs were clear to auscultation bilaterally [Heart Sounds] : normal S1 and S2 [Arterial Pulses Carotid] : carotid pulses were normal with no bruits [Edema] : there was no peripheral edema [No Spinal Tenderness] : no spinal tenderness [Involuntary Movements] : no involuntary movements were seen [] : no rash [1+] : Patella right 1+ [Tremor] : no tremor present [Dysdiadochokinesia Bilaterally] : not present [Coordination - Dysmetria Impaired Finger-to-Nose Bilateral] : not present [FreeTextEntry8] : Mildly antalgic gait, left foot in Boot [FreeTextEntry7] : Decreased pinprick perception with distal to proximal gradient in the upper and lower extremities.  Vibration sense intact [FreeTextEntry1] : Left foot in boot

## 2023-05-16 NOTE — REVIEW OF SYSTEMS
[Poor Coordination] : poor coordination [Numbness] : numbness [Tingling] : tingling [Hypersensitivity] : hypersensitivity [As Noted in HPI] : as noted in HPI [Negative] : Heme/Lymph [Joint Pain] : joint pain [Arm Weakness] : no arm weakness [Hand Weakness] : no hand weakness [FreeTextEntry9] : Hip pain

## 2023-05-16 NOTE — DISCUSSION/SUMMARY
[FreeTextEntry1] : 58-year-old F started with right hip arthritis in Sept 2019, followed by b/l uveitis in 12/2019 and diagnosed Crohn's disease/UC after 3/2020, was treated with Mesalamine for a short time, developed paresthesias / dysesthesias in the fingertips of both hands, arms, upper Torso as well as perioral region in 4/2020, was evaluated by Dr. Mcallister, MRI brain revealed nonspecific white matter changes, MRI C- T spine showed DJD without abnormal cord signal, SSEP's abnormal, spinal tap was done, she has been treated with Rituxan infusions every 6 months for the past 2 and 1/2 years.\par \par # CNS demyelinating/? TM/ autoimmune disease, pt has noted remarkable resolution of Uveitis with Rituxan, does not reports relief of neuropathic pain.\par \par # Patient's symptoms are suggestive of small fiber neuropathy > large fibre neuropathy, however,  hypoactive reflexes may favor large fiber neuropathy / demyelinating polyneuropathy\par \par - In order to determine the type of neuropathy and further management, gold standard for determining type of neuropathy is EMG/NCV studies of upper and lower extremities.  From what I understand patient has never had EMG/NCV studies performed previously.\par - Spinal tap–full CSF results were not available for me to review, hence unable to determine and correlate with demyelinating disease versus albumino-cytological dissociation\par \par I have recommended, patient follow-up and discuss above with Dr. Mcallister, so that the determination regarding continued use of Rituxan versus an alternative agent for autoimmune disease can decided\par \par

## 2023-05-16 NOTE — REASON FOR VISIT
[Follow-Up: _____] : a [unfilled] follow-up visit [FreeTextEntry1] : For opinion regarding continuation/discontinuation of Rituxan being used for CNS demyelinating disease/CIDP

## 2023-05-16 NOTE — HISTORY OF PRESENT ILLNESS
[FreeTextEntry1] : 58-year-old female with history of arthritis, bilateral uveitis, Crohn's disease, dysesthesias of upper/lower extremities extending up to torso, symptoms started in 2019, she has had extensive work-up with Dr. Mcallister, for transverse myelitis, CNS demyelination as well as peripheral neuropathy.\par \par Patient had seen Dr. Mccray at [facility] in 6/2020 he had recommended EMG/NCV studies for diagnostic purposes of neuropathy, in addition to spinal tap and skin biopsy for small fiber neuropathy and rheumatological work-up. \par \par Patient had consulted me in July 2020 for a second opinion, I recommended ruling out B12 deficiency, in addition to EMG/NCV studies of upper and lower extremities to rule out large fiber neuropathy/mononeuritis multiplex and demyelinating polyneuropathy.\par \par Patient comes in today, seeking another opinion, she reports over the past 3 years, she had a spinal tap performed by Dr. Mcallister, analysis of CSF is not available however from the neurologist consult notes, OCPs were positive, NMO/MOG negative, not sure if protein was normal or elevated.  Patient is was started on Rituxan, has been getting 6 monthly infusions since past 2-1/2 years, she reports she has noted total resolution of uveitis, she continues to have numbness in the fingertips and feet, she denies focal motor weakness, gait ataxia, foot drops or worsening balance and gait. \par \par Patient has been following up with Dr. Roy the rheumatologist, he has raised question regarding continued use of Rituxan.  Patient is seeking some clarification in regard to continued use of Rituxan.\par \par \par \par

## 2023-05-16 NOTE — DATA REVIEWED
[de-identified] : 6/19/2020: MRI of the brain with IV contrast revealed minimal non-specific white matter signal abnormalities.\par MRI cervical spine with contrast: multilevel degenerative changes without any cord signals and 11 mm nodule of the right thyroid lobe and was compared with the previous MRI of April 2020. \par 4/29/20: MRI Thoracic spine non-contrast: Disc herniations at T7-T8 and T12 and L1 without Significant canal stenosis  \par  [de-identified] : 5/1/20 SSEP:Abnormal SSEP study of UE's, is indicative of dysfunction in the ascending somatosensory pathways at the brachial plexus.\par SSEP of LE's is abnormal. indicative of dysfunction in the ascending somatosensory pathways between the cervical thoracic spinal cord and thalamocortical regions\par Blood work from Dr. Richardson reviewed

## 2023-06-27 ENCOUNTER — APPOINTMENT (OUTPATIENT)
Dept: MAMMOGRAPHY | Facility: CLINIC | Age: 58
End: 2023-06-27
Payer: COMMERCIAL

## 2023-06-27 ENCOUNTER — RESULT REVIEW (OUTPATIENT)
Age: 58
End: 2023-06-27

## 2023-06-27 ENCOUNTER — OUTPATIENT (OUTPATIENT)
Dept: OUTPATIENT SERVICES | Facility: HOSPITAL | Age: 58
LOS: 1 days | End: 2023-06-27
Payer: COMMERCIAL

## 2023-06-27 ENCOUNTER — APPOINTMENT (OUTPATIENT)
Dept: ULTRASOUND IMAGING | Facility: CLINIC | Age: 58
End: 2023-06-27
Payer: COMMERCIAL

## 2023-06-27 DIAGNOSIS — Z00.00 ENCOUNTER FOR GENERAL ADULT MEDICAL EXAMINATION WITHOUT ABNORMAL FINDINGS: ICD-10-CM

## 2023-06-27 DIAGNOSIS — Z98.890 OTHER SPECIFIED POSTPROCEDURAL STATES: Chronic | ICD-10-CM

## 2023-06-27 DIAGNOSIS — Z00.8 ENCOUNTER FOR OTHER GENERAL EXAMINATION: ICD-10-CM

## 2023-06-27 PROCEDURE — 77067 SCR MAMMO BI INCL CAD: CPT | Mod: 26

## 2023-06-27 PROCEDURE — 77063 BREAST TOMOSYNTHESIS BI: CPT | Mod: 26

## 2023-06-27 PROCEDURE — 77067 SCR MAMMO BI INCL CAD: CPT

## 2023-06-27 PROCEDURE — 76641 ULTRASOUND BREAST COMPLETE: CPT

## 2023-06-27 PROCEDURE — 76641 ULTRASOUND BREAST COMPLETE: CPT | Mod: 26,50

## 2023-06-27 PROCEDURE — 77063 BREAST TOMOSYNTHESIS BI: CPT

## 2023-06-28 ENCOUNTER — NON-APPOINTMENT (OUTPATIENT)
Age: 58
End: 2023-06-28

## 2023-07-13 ENCOUNTER — APPOINTMENT (OUTPATIENT)
Dept: MRI IMAGING | Facility: CLINIC | Age: 58
End: 2023-07-13
Payer: COMMERCIAL

## 2023-07-13 ENCOUNTER — OUTPATIENT (OUTPATIENT)
Dept: OUTPATIENT SERVICES | Facility: HOSPITAL | Age: 58
LOS: 1 days | End: 2023-07-13

## 2023-07-13 DIAGNOSIS — M25.572 PAIN IN LEFT ANKLE AND JOINTS OF LEFT FOOT: ICD-10-CM

## 2023-07-13 DIAGNOSIS — Z98.890 OTHER SPECIFIED POSTPROCEDURAL STATES: Chronic | ICD-10-CM

## 2023-07-13 PROCEDURE — 73721 MRI JNT OF LWR EXTRE W/O DYE: CPT | Mod: 26,LT

## 2023-07-23 ENCOUNTER — NON-APPOINTMENT (OUTPATIENT)
Age: 58
End: 2023-07-23

## 2023-07-26 ENCOUNTER — NON-APPOINTMENT (OUTPATIENT)
Age: 58
End: 2023-07-26

## 2023-07-26 ENCOUNTER — APPOINTMENT (OUTPATIENT)
Dept: PULMONOLOGY | Facility: CLINIC | Age: 58
End: 2023-07-26
Payer: COMMERCIAL

## 2023-07-26 VITALS
HEART RATE: 78 BPM | DIASTOLIC BLOOD PRESSURE: 84 MMHG | WEIGHT: 172 LBS | SYSTOLIC BLOOD PRESSURE: 136 MMHG | BODY MASS INDEX: 26.07 KG/M2 | TEMPERATURE: 97.1 F | OXYGEN SATURATION: 98 % | HEIGHT: 68 IN | RESPIRATION RATE: 18 BRPM

## 2023-07-26 PROCEDURE — 94010 BREATHING CAPACITY TEST: CPT

## 2023-07-26 PROCEDURE — 99214 OFFICE O/P EST MOD 30 MIN: CPT | Mod: 25

## 2023-07-26 RX ORDER — IPRATROPIUM BROMIDE 42 UG/1
0.06 SPRAY, METERED NASAL
Refills: 0 | Status: ACTIVE | COMMUNITY

## 2023-07-26 RX ORDER — AZITHROMYCIN 250 MG/1
250 TABLET, FILM COATED ORAL
Qty: 1 | Refills: 0 | Status: ACTIVE | COMMUNITY
Start: 2023-07-26 | End: 1900-01-01

## 2023-07-28 ENCOUNTER — APPOINTMENT (OUTPATIENT)
Dept: PULMONOLOGY | Facility: CLINIC | Age: 58
End: 2023-07-28
Payer: COMMERCIAL

## 2023-07-28 ENCOUNTER — NON-APPOINTMENT (OUTPATIENT)
Age: 58
End: 2023-07-28

## 2023-07-28 ENCOUNTER — APPOINTMENT (OUTPATIENT)
Dept: ORTHOPEDIC SURGERY | Facility: CLINIC | Age: 58
End: 2023-07-28
Payer: COMMERCIAL

## 2023-07-28 VITALS
TEMPERATURE: 98 F | SYSTOLIC BLOOD PRESSURE: 120 MMHG | RESPIRATION RATE: 18 BRPM | BODY MASS INDEX: 26.07 KG/M2 | HEIGHT: 68 IN | WEIGHT: 172 LBS | DIASTOLIC BLOOD PRESSURE: 67 MMHG | OXYGEN SATURATION: 98 % | HEART RATE: 92 BPM

## 2023-07-28 VITALS — WEIGHT: 172 LBS | HEIGHT: 68 IN | BODY MASS INDEX: 26.07 KG/M2

## 2023-07-28 PROCEDURE — 99214 OFFICE O/P EST MOD 30 MIN: CPT | Mod: 25

## 2023-07-28 PROCEDURE — 94010 BREATHING CAPACITY TEST: CPT

## 2023-07-28 PROCEDURE — 99213 OFFICE O/P EST LOW 20 MIN: CPT

## 2023-07-28 RX ORDER — CEPHALEXIN 500 MG/1
500 CAPSULE ORAL
Qty: 14 | Refills: 0 | Status: DISCONTINUED | COMMUNITY
Start: 2023-06-12

## 2023-07-28 RX ORDER — COVID-19 ANTIGEN TEST
KIT MISCELLANEOUS
Qty: 8 | Refills: 0 | Status: DISCONTINUED | COMMUNITY
Start: 2023-02-10

## 2023-07-28 NOTE — ASSESSMENT
[FreeTextEntry1] : Ms. CHOWDHURY is a 58 year old female coming into the office today for a follow-up pulmonary evaluation with a prior history of chronic inflammatory demyelinating polyradiculoneuropathy, nasal polyps sinus surgery, fibrocystic breast, papilloma, osteoarthritis, anterior uveitis and S/p Covid-19 (05/2022) who now comes in for a f/p pulmonary evaluation for SOB, ?abnormal chest x-ray, (moderate COPD), PND syndrome, snoring - stable, #1 issue is ankle sprain\par \par The patient's SOB is felt to be multifactorial:\par - Overweight\par - Out-of Shape\par - Poor breathing mechanics \par - Pulmonary \par      - In doubting any current underling lung disease despite the x-ray reading\par - Cardiac \par \par Problem 1: In doubting any current underling lung disease despite the x-ray reading\par - Full pft wnl\par - s/p alpha 1 antitrypsin level wnl\par \par Problem 2: PND syndrome (Prior nasal polyps and AM cough) \par - Add Olopatadine 0.6% 1 sniff each nostril twice a day \par -on Benzonatate 200 mg TID (Tessalon Perles) and ipratropium bromide \par -Environmental measures for allergies were encouraged including mattress and pillow covers, air purifier, and environmental controls. \par \par Problem 3: snoring\par - no indication this time for sleep study \par \par Problem 4: Covid-19 05/2022\par - Recommended SaNotize \par - No additional vaccine \par \par Problem 5: chronic  demyelinating polyradiculoneuropathy\par -neuro renew and SPM \par -recommended neurologic f/p\par -recommended rheumatologic f/p\par \par Problem 6: Abnormal chest X-ray\par - Read as "moderate COPD" ,more then likely represents elevated lung capacity and not underlined COPD, no indication for a chest scan \par \par Problem 7: Cardiac\par -Recommended cardiac follow up evaluation with cardiologist if needed \par \par Problem 8: Overweight/out of shape\par - Weight loss, exercise, and diet control were discussed and are highly encouraged. Treatment options were given such as, aqua therapy, and contacting a nutritionist. Recommended to use the elliptical, stationary bike, less use of treadmill. Mindful eating was explained to the patient Obesity is associated with worsening asthma, shortness of breath, and potential for cardiac disease, diabetes, and other underlying medical conditions\par \par Problem 9: Poor Breathing Mechanics\par - Recommended Wim Hof and Buteyko breathing techniques \par - Proper breathing techniques were reviewed with an emphasis of exhalation. Patient instructed to breath in for 1 second and out for four seconds. Patient was encouraged to not talk while walking.\par \par Problem 10: Health maintenance\par -recommended far infrared sauna\par -recommended Epsom salt bath\par -s/p flu shot\par -recommended strep pneumonia vaccines: Prevnar-20 vaccine, followed by Pneumo vaccine 23 one year following\par -recommended early intervention for URIs\par -recommended regular osteoporosis evaluations-recommended early dermatological evaluations\par -recommended after the age of 50 to the age of 70, colonoscopy every 5 years\par \par Follow up in 6 months\par pt is encouraged to call or fax the office with any questions or concerns.\par -Education provided to the PT regarding their visit and conditions.

## 2023-07-28 NOTE — PROCEDURE
[FreeTextEntry1] : SPI performed in office show normal. \par FEV: 112\par FEV1/FVC Ratio: 106\par DPL44-07%: 151\par \par

## 2023-07-28 NOTE — HISTORY OF PRESENT ILLNESS
[TextBox_4] : Ms. CHOWDHURY is a 58 year old female presenting to the office today for f/p pulmonary evaluation. Her chief complaint is-\par \par -she notes on Tessalon Perles and ipratropium bromide, which have improved her Sx\par -she notes urgent care visit 2 days ago\par -she notes constipation with Ca supplementation\par -she notes her bowels are otherwise regular\par -she notes ankle sprain\par -she notes exercising (PT)\par -she notes good quality of sleep\par -she notes sinuses are active, but controlled with Rx\par \par -she denies any headaches, nausea, emesis, fever, chills, sweats, chest pain, chest pressure, coughing, wheezing, palpitations, diarrhea, constipation, dysphagia, vertigo, leg swelling, itchy eyes, itchy ears, heartburn, reflux, or sour taste in the mouth.\par

## 2023-07-28 NOTE — PROCEDURE
[FreeTextEntry1] : PFTs revealed normal flows; FEV1 was  3.42L, which is 121% of predicted; normal flow volume loop. \par PFTs were performed to evaluate for SOB\par \par JOURDAN test revealed severely reduced PI max and PE max, with PI max of 37, which is 50% of predicted; PE max of 45, which is 32% of predicted.\par

## 2023-07-28 NOTE — REASON FOR VISIT
[Follow-Up] : a follow-up visit [Cough] : cough [TextBox_44] : abnormal chest x-ray, PND syndrome, snoring

## 2023-07-28 NOTE — HISTORY OF PRESENT ILLNESS
[TextBox_4] : Ms. CHOWDHURY is a 58 year old female with a prior history of chronic inflammatory demyelinating polyradiculoneuropathy, nasal polyps sinus surgery, fibrocystic breast, papilloma, osteoarthritis, anterior uveitis and S/p Covid-19 (05/2022) who now presents to the office for an acute visit.\par \par Her chief concern is a cough.\par \par Patient reports 10 days ago she started experiencing sore throat.  She states later on she experienced postnasal drip and cough with yellow phlegm.  Patient reports she went to urgent care on Monday and was prescribed ipratropium nasal spray and benzonatate 200 mg tablets.  She states she used benzonatate twice and ipratropium nasal spray once with little benefit. Patient reports she was also taking over-the-counter Mucinex with benefit.  She states the cough is dry since yesterday.\par Patient reports she does not feel like she is coming down with a cold.\par \par Patient denies fever, chills, SOB @ rest or exertion, wheezing, nasal congestion, runny nose, or heartburn/reflux.\par

## 2023-07-28 NOTE — ADDENDUM
[FreeTextEntry1] : Documented by Karolyn Lopez acting as a scribe for Dr. Dewayne Greenfield on 07/28/2023. All medical record entries made by the Scribe were at my, Dr. Dewayne Greenfield's, direction and personally dictated by me on 07/28/2023. I have reviewed the chart and agree that the record accurately reflects my personal performance of the history, physical exam, assessment and plan. I have also personally directed, reviewed, and agree with the discharge instructions.\par \par

## 2023-07-28 NOTE — ASSESSMENT
[FreeTextEntry1] : Ms. CHOWDHURY is a 58 year old female with a prior history of chronic inflammatory demyelinating polyradiculoneuropathy, nasal polyps sinus surgery, fibrocystic breast, papilloma, osteoarthritis, anterior uveitis and S/p Covid-19 (05/2022) who now presents to the office for an acute visit.\par \par Given spirometry and cough likely r/t post nasal drip due to recent URI. Will treat for post nasal drip and cough. \par \par 1.Cough\par -likely r/t post nasal drip due to recent URI\par -see treatment for PND\par -increase benzonatate 200 mg PO TID\par -add Z-rosa isela X 5 days (patient to start antibiotics if cough and PND not improving after increasing frequency nasal spray and benzonatate)\par \par 2.PND/Allergies\par -increase Atrovent 2 sprays TID\par -advised OTC antihistamine\par \par Patient to follow up with Dr. Greenfield as scheduled.\par Patient to call with further questions and concerns.\par Patient verbalizes understanding of care plan and is agreeable.

## 2023-08-03 ENCOUNTER — RX RENEWAL (OUTPATIENT)
Age: 58
End: 2023-08-03

## 2023-08-03 RX ORDER — OLOPATADINE HYDROCHLORIDE 665 UG/1
0.6 SPRAY, METERED NASAL
Qty: 3 | Refills: 2 | Status: ACTIVE | COMMUNITY
Start: 2023-01-20 | End: 1900-01-01

## 2023-08-15 NOTE — PHYSICAL EXAM
Transition of Care Plan:    RUR: 9%  Prior Level of Functioning:  independent    Disposition: SNF   If SNF or IPR: Date FOC offered: 8/14/23  Date [address] received: 8/14/23  Accepting facility: [address] [place] [address]  Date authorization started with reference number: 8/14/23  Date authorization received and expires: tbd  Follow up appointments: pcp/specialist   DME needed: defer to SNF   Transportation at discharge: BLS vs wheelchair   IM/IMM Medicare/ letter given: will need prior to d/c   Caregiver Contact: Tita valencia [phone]    Discharge Caregiver contacted prior to discharge? yes  Care Conference needed? no  Barriers to discharge: insurance auth. Cm spoke with Homero Bruce at [address] [place] [address], she said Nino Cowan is still pending. PT/OT taty completed and notes are pending.     Daren Matamoros, [address] [place] Management Department  IR:[phone] [No Acute Distress] : no acute distress [Normal Oropharynx] : normal oropharynx [Normal Appearance] : normal appearance [No Neck Mass] : no neck mass [Normal Rate/Rhythm] : normal rate/rhythm [Normal S1, S2] : normal s1, s2 [No Murmurs] : no murmurs [No Resp Distress] : no resp distress [Clear to Auscultation Bilaterally] : clear to auscultation bilaterally [No Abnormalities] : no abnormalities [Benign] : benign [Normal Gait] : normal gait [No Clubbing] : no clubbing [No Cyanosis] : no cyanosis [No Edema] : no edema [FROM] : FROM [Normal Color/ Pigmentation] : normal color/ pigmentation [No Focal Deficits] : no focal deficits [Oriented x3] : oriented x3 [Normal Affect] : normal affect [III] : Mallampati Class: III [TextBox_68] : I:E ratio 1:3; clear

## 2023-08-16 NOTE — PHYSICAL EXAM
[de-identified] : General Exam\par \par Well developed, well nourished\par No apparent distress\par Oriented to person, place, and time\par Mood: Normal\par Affect: Normal\par Balance and coordination: Normal\par Gait: Normal\par \par left ankle exam\par \par Skin: Clean, dry, intact\par Inspection: No obvious malalignment, no swelling, no effusion\par Tenderness: No tenderness over the lateral malleolus, medial malleolus, + ttp CFL/ATFL/PTFL, no ttp deltoid ligament. No tenderness proximal fibula. No tenderness about heel, no pain with heel squeeze.\par ROM:dorsi flexion 20°, plantar flexion 40° normal subtalar motion. \par Stability: Negative anterior/posterior drawer.\par Additional tests: Negative Mortons compression test, Negative syndesmosis squeeze test.\par Strength: 5/5 TA/GS/EHL\par Sensation: Intact to light touch in dp/sp/tib/jocelin/saph distributions\par Pulses: 2+ DP/PT pulses\par  [de-identified] : MRI reviewed left ankle.  Findings consistent with lateral ankle sprain with partial tearing of the anterior inferior talofibular ligament and a bony avulsion of the calcaneofibular ligament

## 2023-08-16 NOTE — HISTORY OF PRESENT ILLNESS
[de-identified] : 58 y.o F presents to the office complaining of 4 months of L ankle pain. States in March she inverted her L ankle. Denies previous history of injury to either ankle. Denies mechanical symptoms. Denies numbness/tingling in involved area. She has tried a full course of physical therapy. She presents today in an ankle brace. She presents today with MRI results. Here today to discuss treatment options for involved injury. \par \par The patient's past medical history, past surgical history, medications, allergies, and social history were reviewed by me today with the patient and documented accordingly. In addition, the patient's family history, which is noncontributory to this visit, was also reviewed.\par

## 2023-08-16 NOTE — DISCUSSION/SUMMARY
[de-identified] : 50-year-old female severe ankle sprain 3 months ago she was treated in a boot for 2 months and now in a brace.  She has not done consistent physical therapy and she said significant immobilization.  Her findings are consistent with a lateral ankle sprain there is no instability on examination today there is a small bony evulsion which is common with this injury pattern this was discussed at length patient.  Recommend physical therapy at this time.  She may follow-up in 2 months.  She also did cortisone injection last week from her podiatrist

## 2023-08-16 NOTE — CONSULT LETTER
[Dear  ___] : Dear  [unfilled], [Consult Letter:] : I had the pleasure of evaluating your patient, [unfilled]. [Please see my note below.] : Please see my note below. [Consult Closing:] : Thank you very much for allowing me to participate in the care of this patient.  If you have any questions, please do not hesitate to contact me. [Sincerely,] : Sincerely, [FreeTextEntry3] : DR. SUKHWINDER NICHOLAS

## 2023-09-05 DIAGNOSIS — E78.5 HYPERLIPIDEMIA, UNSPECIFIED: ICD-10-CM

## 2023-09-06 ENCOUNTER — APPOINTMENT (OUTPATIENT)
Dept: BREAST CENTER | Facility: CLINIC | Age: 58
End: 2023-09-06
Payer: COMMERCIAL

## 2023-09-06 VITALS
HEIGHT: 68 IN | HEART RATE: 90 BPM | BODY MASS INDEX: 25.91 KG/M2 | SYSTOLIC BLOOD PRESSURE: 136 MMHG | WEIGHT: 171 LBS | DIASTOLIC BLOOD PRESSURE: 88 MMHG

## 2023-09-06 DIAGNOSIS — N60.19 DIFFUSE CYSTIC MASTOPATHY OF UNSPECIFIED BREAST: ICD-10-CM

## 2023-09-06 DIAGNOSIS — Z91.89 OTHER SPECIFIED PERSONAL RISK FACTORS, NOT ELSEWHERE CLASSIFIED: ICD-10-CM

## 2023-09-06 PROCEDURE — 99213 OFFICE O/P EST LOW 20 MIN: CPT

## 2023-09-06 RX ORDER — BENZONATATE 200 MG/1
200 CAPSULE ORAL 3 TIMES DAILY
Qty: 60 | Refills: 0 | Status: DISCONTINUED | COMMUNITY
Start: 2023-07-26 | End: 2023-09-06

## 2023-09-06 NOTE — DATA REVIEWED
[FreeTextEntry1] : Bilateral mammogram and breast ultrasound 6/27/2023:  No mammographic or sonographic evidence of malignancy.   Bilateral breast MRI 11/15/2022:No MR evidence of malignancy. 3.1 cm subdermal lesion left midline chest wall, likely sebaceous cyst  (Images reviewed on PACS.)

## 2023-09-06 NOTE — HISTORY OF PRESENT ILLNESS
[FreeTextEntry1] : This is a 58 year old  postmenopausal female who had noticed clear discharge on her nightgown, possibly from both breasts, for about 3 months in 2020. She then noticed brown discharge from her right nipple.  An MRI showed abnormal retroareolar enhancing nodules in the right breast.  Right MRI core biopsy showed a papilloma.  She underwent Saviscout localized right breast biopsy on 10/20/20.    She started noticing discharge on her nightgown again in 2021. An MRI in October 2021 showed another abnormality in the right breast and an MRI biopsy was benign.  She no longer sees any discharge.

## 2023-09-06 NOTE — PHYSICAL EXAM
[EOMI] : extra ocular movement intact [Sclera nonicteric] : sclera nonicteric [Supple] : supple [No Supraclavicular Adenopathy] : no supraclavicular adenopathy [No Cervical Adenopathy] : no cervical adenopathy [No Thyromegaly] : no thyromegaly [Clear to Auscultation Bilat] : clear to auscultation bilaterally [No dominant masses] : no dominant masses in right breast  [No dominant masses] : no dominant masses left breast [No Nipple Retraction] : no left nipple retraction [No Nipple Discharge] : no left nipple discharge [No Axillary Lymphadenopathy] : no left axillary lymphadenopathy [Soft] : abdomen soft [Not Tender] : non-tender [de-identified] : Circumareolar scar 3:00.

## 2023-09-07 ENCOUNTER — APPOINTMENT (OUTPATIENT)
Dept: RHEUMATOLOGY | Facility: CLINIC | Age: 58
End: 2023-09-07
Payer: COMMERCIAL

## 2023-09-07 VITALS
DIASTOLIC BLOOD PRESSURE: 86 MMHG | HEIGHT: 68 IN | HEART RATE: 79 BPM | OXYGEN SATURATION: 97 % | WEIGHT: 171 LBS | BODY MASS INDEX: 25.91 KG/M2 | SYSTOLIC BLOOD PRESSURE: 121 MMHG

## 2023-09-07 DIAGNOSIS — Z79.1 LONG TERM (CURRENT) USE OF NON-STEROIDAL ANTI-INFLAMMATORIES (NSAID): ICD-10-CM

## 2023-09-07 DIAGNOSIS — C73 MALIGNANT NEOPLASM OF THYROID GLAND: ICD-10-CM

## 2023-09-07 PROCEDURE — 99214 OFFICE O/P EST MOD 30 MIN: CPT

## 2023-09-29 ENCOUNTER — APPOINTMENT (OUTPATIENT)
Dept: ORTHOPEDIC SURGERY | Facility: CLINIC | Age: 58
End: 2023-09-29
Payer: COMMERCIAL

## 2023-09-29 VITALS — WEIGHT: 170 LBS | BODY MASS INDEX: 25.76 KG/M2 | HEIGHT: 68 IN

## 2023-09-29 PROCEDURE — 99213 OFFICE O/P EST LOW 20 MIN: CPT

## 2023-11-13 PROBLEM — C73 PAPILLARY THYROID CARCINOMA: Status: ACTIVE | Noted: 2021-10-21

## 2023-11-13 PROBLEM — Z79.1 NSAID LONG-TERM USE: Status: ACTIVE | Noted: 2019-12-18

## 2023-12-13 ENCOUNTER — APPOINTMENT (OUTPATIENT)
Dept: ORTHOPEDIC SURGERY | Facility: CLINIC | Age: 58
End: 2023-12-13
Payer: COMMERCIAL

## 2023-12-13 VITALS — BODY MASS INDEX: 25.76 KG/M2 | WEIGHT: 170 LBS | HEIGHT: 68 IN

## 2023-12-13 LAB — CRP SERPL HS-MCNC: 2.31 MG/L

## 2023-12-13 PROCEDURE — 20610 DRAIN/INJ JOINT/BURSA W/O US: CPT | Mod: RT

## 2023-12-13 PROCEDURE — 73564 X-RAY EXAM KNEE 4 OR MORE: CPT | Mod: RT

## 2023-12-13 PROCEDURE — 99214 OFFICE O/P EST MOD 30 MIN: CPT | Mod: 25

## 2023-12-13 NOTE — DISCUSSION/SUMMARY
[de-identified] : Right knee osteoarthritis with acute exacerbation.  I discussed the treatment of degenerative arthritis with the patient at length today. I described the spectrum of treatment from nonoperative modalities to total joint arthroplasty. Noninvasive and nonoperative treatment modalities include weight reduction, activity modification with low impact exercise, PRN use of acetaminophen or anti-inflammatory medication if tolerated, glucosamine/chondroitin supplements, and physical therapy. Further treatments can include corticosteroid injection and the use of hyaluronic acid injections. Definitive treatment can certainly include total joint arthroplasty also. The risks and benefits of each treatment options was discussed and all questions were answered.  Injection: Right knee joint. Indication: Arthritis.  A discussion was had with the patient regarding this procedure and all questions were answered. All risks, benefits and alternatives were discussed. These included but were not limited to bleeding, infection, and allergic reaction. Alcohol was used to clean the skin, and betadine was used to sterilize and prep the area in the supero-lateral aspect of the right knee. Ethyl chloride spray was then used as a topical anesthetic. A 21-gauge needle was used to inject 4cc of 1% lidocaine and 1cc of 40mg/ml methylprednisolone into the knee. A sterile bandage was then applied. The patient tolerated the procedure well and there were no complications.   Ice.  Tylenol.  Follow-up as needed

## 2023-12-13 NOTE — PHYSICAL EXAM
[de-identified] : General Exam  Well developed, well nourished No apparent distress Oriented to person, place, and time Mood: Normal Affect: Normal Balance and coordination: Normal Gait: Normal  right knee exam  Skin: Clean, dry, intact Inspection: No obvious malalignment, no masses, no swelling, mild effusion. Tenderness: no MJLT. No LJLT. No tenderness over the medial and lateral patella facets. No ttp medial/lateral epicondyle, patella tendon, tibial tubercle, pes anserinus, or proximal fibula. ROM: 0 to 140 no pain with deep flexion  Stability: Stable to varus, valgus, lachman testing. Negative anterior/posterior drawer. Additional tests: Negative McMurrays test, Negative patellar grind test.  Strength: 5/5 Q/H/TA/GS/EHL, no atrophy Neuro: In tact to light touch throughout in dp/sp/tib/jocelin/saph nerve districutions, DTR's normal Pulses: 2+ DP/PT pulses. [de-identified] :  The following radiographs were ordered and read by me during this patients visit. I reviewed each radiograph in detail with the patient and discussed the findings as highlighted below.  4 views right knee were obtained today mild arthrosis slight joint space narrowing no fracture

## 2023-12-13 NOTE — HISTORY OF PRESENT ILLNESS
[de-identified] : Right knee pain 1 week no falls no trauma.  She reports that she was doing exercise bike she did notice swelling in her knee denies prior issues with this knee she denies any numbness or tingling.  She is having pain with walking.

## 2023-12-21 LAB
APOLIPOPROTEIN B: 77 MG/DL
CHOLESTEROL, TOTAL: 212 MG/DL
CHOLESTEROL/HDL RATIO: 2.3
HDL CHOLESTEROL: 91 MG/DL
HLD.LARGE SERPL-SCNC: NORMAL NMOL/L
LDL MEDIUM: 244 NMOL/L
LDL SERPL QN: 225.5
LDL SERPL-SCNC: 1325 NMOL/L
LDL SMALL SERPL-SCNC: 168 NMOL/L
LDLC REAL SIZE PAT SERPL: NORMAL
LDLC SERPL CALC-MCNC: 106
LIPOPROTEIN (A): 19 NMOL/L
NON-HDL CHOLESTEROL: 121
TRIGLYCERIDES: 67 MG/DL

## 2024-01-04 ENCOUNTER — APPOINTMENT (OUTPATIENT)
Dept: OBGYN | Facility: CLINIC | Age: 59
End: 2024-01-04
Payer: COMMERCIAL

## 2024-01-04 VITALS
BODY MASS INDEX: 26.22 KG/M2 | SYSTOLIC BLOOD PRESSURE: 151 MMHG | HEIGHT: 68 IN | HEART RATE: 92 BPM | DIASTOLIC BLOOD PRESSURE: 87 MMHG | WEIGHT: 173 LBS

## 2024-01-04 PROCEDURE — 99213 OFFICE O/P EST LOW 20 MIN: CPT

## 2024-01-04 NOTE — PHYSICAL EXAM
[Chaperone Present] : A chaperone was present in the examining room during all aspects of the physical examination [Labia Majora] : normal [Labia Minora] : normal [Discharge] : a  ~M vaginal discharge was present [Scant] : scant [Foul Smelling] : not foul smelling [White] : white [Thin] : thin [Normal] : normal [Tenderness] : nontender [Anteversion] : anteverted [Mass ___ cm] : no uterine mass was palpated [Uterine Adnexae] : normal [FreeTextEntry5] : No CMT

## 2024-01-04 NOTE — PLAN
[FreeTextEntry1] : Patient is a 58-year-old  4 para 2-0-2-2 last menstrual period 2019 Patient presents for relation complaining of a possible yeast infection Patient states she may have developed a yeast infection after diagnosis of COVID and self treated with over-the-counter 3 night cream,,, use the last dose approximately 3 days prior Physical exam reveals a well-developed well-nourished female in no apparent distress,, BMI 26 Pelvic Ti shows normal female external genitalia, vagina no lesions minimal erythema and minimal thin white discharge,, cervix appropriate size no cervical motion tenderness, uterus anteverted normal size nontender, adnexa no mass nontender Vaginal culture performed Will await culture results prior to initiating treatment since patient did self treat  Julissa was present as a chaperone for the entire assessment and examination of this patient

## 2024-01-09 DIAGNOSIS — N39.0 URINARY TRACT INFECTION, SITE NOT SPECIFIED: ICD-10-CM

## 2024-01-09 LAB
A VAGINAE DNA VAG QL NAA+PROBE: NORMAL
BACTERIA UR CULT: ABNORMAL
BVAB2 DNA VAG QL NAA+PROBE: NORMAL
C KRUSEI DNA VAG QL NAA+PROBE: NEGATIVE
C TRACH RRNA SPEC QL NAA+PROBE: NEGATIVE
CANDIDA DNA VAG QL NAA+PROBE: NEGATIVE
MEGA1 DNA VAG QL NAA+PROBE: NORMAL
N GONORRHOEA RRNA SPEC QL NAA+PROBE: NEGATIVE
T VAGINALIS RRNA SPEC QL NAA+PROBE: NEGATIVE

## 2024-01-26 ENCOUNTER — APPOINTMENT (OUTPATIENT)
Dept: PULMONOLOGY | Facility: CLINIC | Age: 59
End: 2024-01-26
Payer: COMMERCIAL

## 2024-01-26 ENCOUNTER — APPOINTMENT (OUTPATIENT)
Dept: BREAST CENTER | Facility: CLINIC | Age: 59
End: 2024-01-26
Payer: COMMERCIAL

## 2024-01-26 VITALS
DIASTOLIC BLOOD PRESSURE: 89 MMHG | SYSTOLIC BLOOD PRESSURE: 141 MMHG | BODY MASS INDEX: 26.22 KG/M2 | HEART RATE: 84 BPM | HEIGHT: 68 IN | WEIGHT: 173 LBS

## 2024-01-26 VITALS
WEIGHT: 170 LBS | BODY MASS INDEX: 25.76 KG/M2 | TEMPERATURE: 97.3 F | RESPIRATION RATE: 18 BRPM | HEART RATE: 83 BPM | HEIGHT: 68 IN | DIASTOLIC BLOOD PRESSURE: 80 MMHG | SYSTOLIC BLOOD PRESSURE: 120 MMHG | OXYGEN SATURATION: 99 %

## 2024-01-26 DIAGNOSIS — N64.52 NIPPLE DISCHARGE: ICD-10-CM

## 2024-01-26 DIAGNOSIS — R06.02 SHORTNESS OF BREATH: ICD-10-CM

## 2024-01-26 DIAGNOSIS — E66.3 OVERWEIGHT: ICD-10-CM

## 2024-01-26 DIAGNOSIS — R93.89 ABNORMAL FINDINGS ON DIAGNOSTIC IMAGING OF OTHER SPECIFIED BODY STRUCTURES: ICD-10-CM

## 2024-01-26 DIAGNOSIS — Z86.018 PERSONAL HISTORY OF OTHER BENIGN NEOPLASM: ICD-10-CM

## 2024-01-26 DIAGNOSIS — R06.83 SNORING: ICD-10-CM

## 2024-01-26 DIAGNOSIS — J30.9 ALLERGIC RHINITIS, UNSPECIFIED: ICD-10-CM

## 2024-01-26 PROCEDURE — 94727 GAS DIL/WSHOT DETER LNG VOL: CPT

## 2024-01-26 PROCEDURE — 94729 DIFFUSING CAPACITY: CPT

## 2024-01-26 PROCEDURE — ZZZZZ: CPT

## 2024-01-26 PROCEDURE — 94010 BREATHING CAPACITY TEST: CPT

## 2024-01-26 PROCEDURE — 99213 OFFICE O/P EST LOW 20 MIN: CPT

## 2024-01-26 PROCEDURE — 99214 OFFICE O/P EST MOD 30 MIN: CPT | Mod: 25

## 2024-01-26 NOTE — PROCEDURE
[FreeTextEntry1] : Full PFT reveals normal flows; FEV1 was  3.22L which is 114% of predicted; normal lung volumes; normal diffusion at 31.1, which is 133% of predicted; normal flow volume loop. PFTs were performed to evaluate for SOB

## 2024-01-26 NOTE — PHYSICAL EXAM
[EOMI] : extra ocular movement intact [Sclera nonicteric] : sclera nonicteric [Supple] : supple [No Supraclavicular Adenopathy] : no supraclavicular adenopathy [No Cervical Adenopathy] : no cervical adenopathy [No Thyromegaly] : no thyromegaly [Clear to Auscultation Bilat] : clear to auscultation bilaterally [No dominant masses] : no dominant masses in right breast  [No dominant masses] : no dominant masses left breast [No Nipple Retraction] : no left nipple retraction [de-identified] : 59 year M with PMH HTN, HLD, PTSD, and complaint of fatigue presents for initial CPE. Pt denies CP/SOB, fever/chills, n/v/d/c.  [No Nipple Discharge] : no left nipple discharge [No Axillary Lymphadenopathy] : no left axillary lymphadenopathy [Soft] : abdomen soft [Not Tender] : non-tender [de-identified] : Circumareolar scar 3:00. No discharge could be elicited.

## 2024-01-26 NOTE — ADDENDUM
[FreeTextEntry1] : Documented by Karolyn Lopez acting as a scribe for Dr. Dewayne Greenfield on 01/26/2024. All medical record entries made by the Scribe were at my, Dr. Dewayne Greenfield's, direction and personally dictated by me on 01/26/2024. I have reviewed the chart and agree that the record accurately reflects my personal performance of the history, physical exam, assessment and plan. I have also personally directed, reviewed, and agree with the discharge instructions.

## 2024-01-26 NOTE — DATA REVIEWED
[FreeTextEntry1] : Bilateral mammogram and breast ultrasound 6/27/2023:  No mammographic or sonographic evidence of malignancy.   Bilateral breast MRI 11/15/2022:No MR evidence of malignancy. 3.1 cm subdermal lesion left midline chest wall, likely sebaceous cyst

## 2024-01-26 NOTE — HISTORY OF PRESENT ILLNESS
[FreeTextEntry1] : This is a 58 year old  postmenopausal female who had noticed clear discharge on her nightgown, possibly from both breasts, for about 3 months in 2020. She then noticed brown discharge from her right nipple.  An MRI showed abnormal retroareolar enhancing nodules in the right breast.  Right MRI core biopsy showed a papilloma.  She underwent Saviscout localized right breast biopsy on 10/20/20.    She started noticing discharge on her nightgown again in 2021. An MRI in October 2021 showed another abnormality in the right breast and an MRI biopsy was benign.  She has again starting noticing right nipple discharge.  She sees it in the morning on her nightgown and it seems clear.  She also noticed it once after exercising.  It happens about once a week.

## 2024-01-26 NOTE — ASSESSMENT
[FreeTextEntry1] : Ms. CHOWDHURY is a 58 year old female coming into the office today for a follow-up pulmonary evaluation with a prior history of chronic inflammatory demyelinating polyradiculoneuropathy, nasal polyps sinus surgery, fibrocystic breast, papilloma, osteoarthritis, anterior uveitis and S/p Covid-19 (05/2022, 12/2023) who now comes in for a follow-up pulmonary evaluation for SOB, ?abnormal chest x-ray, (moderate COPD), PND syndrome, snoring - stable, #1 issue is leg weakness  The patient's SOB is felt to be multifactorial: - Overweight - Out-of Shape - Poor breathing mechanics - Pulmonary   -doubting any current underling lung disease despite the x-ray reading - Cardiac  Problem 1: doubting any current underling lung disease despite the x-ray reading - Full pft wnl - s/p alpha 1 antitrypsin level wnl  Problem 2: PND syndrome (Prior nasal polyps and AM cough) - continue Olopatadine 0.6% 1 sniff each nostril twice a day -on Benzonatate 200 mg TID (Tessalon Perles) and ipratropium bromide -Environmental measures for allergies were encouraged including mattress and pillow covers, air purifier, and environmental controls.  Problem 3: snoring - no indication this time for sleep study  Problem 4: Covid-19 05/2022, 12/2023 - Recommended SaNotize - No additional vaccine  Problem 5: chronic demyelinating polyradiculoneuropathy -neuro renew and SPM -recommended neurologic f/p -recommended rheumatologic f/p  Problem 6: Abnormal chest X-ray - Read as "moderate COPD" ,more then likely represents elevated lung capacity and not underlined COPD, no indication for a chest scan  Problem 7: Cardiac -Recommended cardiac follow up evaluation with cardiologist if needed  Problem 8: Overweight/out of shape -recommended Berberine OTC supplement for visceral fat loss  - Weight loss, exercise, and diet control were discussed and are highly encouraged. Treatment options were given such as, aqua therapy, and contacting a nutritionist. Recommended to use the elliptical, stationary bike, less use of treadmill. Mindful eating was explained to the patient Obesity is associated with worsening asthma, shortness of breath, and potential for cardiac disease, diabetes, and other underlying medical conditions  Problem 9: Poor Breathing Mechanics - Recommended Shelia Henderson and Armando breathing techniques - Proper breathing techniques were reviewed with an emphasis of exhalation. Patient instructed to breath in for 1 second and out for four seconds. Patient was encouraged to not talk while walking.  Problem 10: Health maintenance -recommended far infrared sauna -recommended Epsom salt bath -s/p flu shot 2023 -recommended strep pneumonia vaccines: Prevnar-20 vaccine, followed by Pneumo vaccine 23 one year following -recommended early intervention for URIs -recommended regular osteoporosis evaluations-recommended early dermatological evaluations -recommended after the age of 50 to the age of 70, colonoscopy every 5 years  Follow up in 6 months pt is encouraged to call or fax the office with any questions or concerns. -Education provided to the PT regarding their visit and conditions.

## 2024-01-26 NOTE — HISTORY OF PRESENT ILLNESS
[TextBox_4] : Ms. CHOWDHURY is a 58 year old female presenting to the office today for a follow-up pulmonary evaluation. Her chief complaint is  -she notes feeling generally well  -she notes sleeping for 8 hours -she denies heartburn and reflux -she notes energy levels are good (9/10) -she notes vision is stable  -she notes dypshonia due to PND in the morning -she notes she uses olopatadine 3-4 times per week -she notes weight is stable  -she notes exercising (20-30 minutes Peloton, weightlifting) -she notes she takes fish oil, vitamin K, and apple cider vinegar tablets -she notes her ankle sprain has resolved -she notes feeling weakness, and tingling/sensitivity in her hands due to neuropathy  -she denies any headaches, nausea, emesis, fever, chills, sweats, chest pain, chest pressure, coughing, wheezing, palpitations, diarrhea, constipation, dysphagia, vertigo, arthralgias, myalgias, leg swelling, itchy eyes, itchy ears, heartburn, reflux, or sour taste in the mouth.

## 2024-01-29 LAB
APPEARANCE: CLEAR
BACTERIA UR CULT: ABNORMAL
BILIRUBIN URINE: NEGATIVE
BLOOD URINE: NEGATIVE
COLOR: YELLOW
GLUCOSE QUALITATIVE U: NEGATIVE MG/DL
KETONES URINE: NEGATIVE MG/DL
LEUKOCYTE ESTERASE URINE: NEGATIVE
NITRITE URINE: NEGATIVE
PH URINE: 6.5
PROTEIN URINE: NEGATIVE MG/DL
SPECIFIC GRAVITY URINE: 1.02
UROBILINOGEN URINE: 0.2 MG/DL

## 2024-01-29 RX ORDER — SULFAMETHOXAZOLE AND TRIMETHOPRIM 800; 160 MG/1; MG/1
800-160 TABLET ORAL
Qty: 14 | Refills: 0 | Status: ACTIVE | COMMUNITY
Start: 2024-01-29 | End: 1900-01-01

## 2024-02-07 ENCOUNTER — APPOINTMENT (OUTPATIENT)
Dept: MRI IMAGING | Facility: CLINIC | Age: 59
End: 2024-02-07
Payer: COMMERCIAL

## 2024-02-07 ENCOUNTER — OUTPATIENT (OUTPATIENT)
Dept: OUTPATIENT SERVICES | Facility: HOSPITAL | Age: 59
LOS: 1 days | End: 2024-02-07
Payer: COMMERCIAL

## 2024-02-07 DIAGNOSIS — Z00.8 ENCOUNTER FOR OTHER GENERAL EXAMINATION: ICD-10-CM

## 2024-02-07 DIAGNOSIS — Z98.890 OTHER SPECIFIED POSTPROCEDURAL STATES: Chronic | ICD-10-CM

## 2024-02-07 DIAGNOSIS — N64.52 NIPPLE DISCHARGE: ICD-10-CM

## 2024-02-07 DIAGNOSIS — Z86.018 PERSONAL HISTORY OF OTHER BENIGN NEOPLASM: ICD-10-CM

## 2024-02-07 PROCEDURE — 77049 MRI BREAST C-+ W/CAD BI: CPT | Mod: 26

## 2024-02-07 PROCEDURE — A9585: CPT

## 2024-02-07 PROCEDURE — C8908: CPT

## 2024-02-07 PROCEDURE — C8937: CPT

## 2024-02-08 ENCOUNTER — APPOINTMENT (OUTPATIENT)
Dept: OBGYN | Facility: CLINIC | Age: 59
End: 2024-02-08
Payer: COMMERCIAL

## 2024-02-08 VITALS
WEIGHT: 170 LBS | BODY MASS INDEX: 25.76 KG/M2 | HEART RATE: 99 BPM | SYSTOLIC BLOOD PRESSURE: 145 MMHG | HEIGHT: 68 IN | DIASTOLIC BLOOD PRESSURE: 82 MMHG

## 2024-02-08 DIAGNOSIS — Z79.620 ENCOUNTER FOR THERAPEUTIC DRUG LVL MONITORING: ICD-10-CM

## 2024-02-08 DIAGNOSIS — M25.572 PAIN IN LEFT ANKLE AND JOINTS OF LEFT FOOT: ICD-10-CM

## 2024-02-08 DIAGNOSIS — Z11.51 ENCOUNTER FOR SCREENING FOR HUMAN PAPILLOMAVIRUS (HPV): ICD-10-CM

## 2024-02-08 DIAGNOSIS — Z92.29 PERSONAL HISTORY OF OTHER DRUG THERAPY: ICD-10-CM

## 2024-02-08 DIAGNOSIS — Z12.4 ENCOUNTER FOR SCREENING FOR MALIGNANT NEOPLASM OF CERVIX: ICD-10-CM

## 2024-02-08 DIAGNOSIS — Z51.81 ENCOUNTER FOR THERAPEUTIC DRUG LVL MONITORING: ICD-10-CM

## 2024-02-08 DIAGNOSIS — Z12.39 ENCOUNTER FOR OTHER SCREENING FOR MALIGNANT NEOPLASM OF BREAST: ICD-10-CM

## 2024-02-08 DIAGNOSIS — Z01.419 ENCOUNTER FOR GYNECOLOGICAL EXAMINATION (GENERAL) (ROUTINE) W/OUT ABNORMAL FINDINGS: ICD-10-CM

## 2024-02-08 DIAGNOSIS — Z12.31 ENCOUNTER FOR SCREENING MAMMOGRAM FOR MALIGNANT NEOPLASM OF BREAST: ICD-10-CM

## 2024-02-08 DIAGNOSIS — M25.561 PAIN IN RIGHT KNEE: ICD-10-CM

## 2024-02-08 DIAGNOSIS — R92.30 DENSE BREASTS, UNSPECIFIED: ICD-10-CM

## 2024-02-08 DIAGNOSIS — U07.1 COVID-19: ICD-10-CM

## 2024-02-08 DIAGNOSIS — Z87.898 PERSONAL HISTORY OF OTHER SPECIFIED CONDITIONS: ICD-10-CM

## 2024-02-08 PROCEDURE — 99396 PREV VISIT EST AGE 40-64: CPT

## 2024-02-08 NOTE — PLAN
[FreeTextEntry1] : Patient is a 58-year-old  4 para 2-0-2-2 last menstrual period  Patient presents for annual visit,, denies any complaints Physical exam reveals a well-developed well-nourished female no apparent distress,, BMI 26 Heart regular rhythm and rate, lungs clear, breast no mass nontender no skin change no nipple discharge no adenopathy, abdomen soft nontender no organomegaly Pelvic exam shows normal female external genitalia, vagina no lesions, cervix appropriate size nontender, uterus anteverted normal size nontender, adnexa no mass nontender Pap smear was performed Patient had recent mammogram performed in July of this past year with negative findings and also had an MRI of the breast performed last week with normal findings Patient states she had colonoscopy performed in  with negative findings Essentially benign joint exam Follow-up 1 year or prior to that as needed  Julissa was present as a chaperone for the entire assessment and examination of this patient

## 2024-02-12 LAB
BACTERIA UR CULT: ABNORMAL
HPV HIGH+LOW RISK DNA PNL CVX: NOT DETECTED

## 2024-02-12 RX ORDER — AMPICILLIN 500 MG/1
500 CAPSULE ORAL 4 TIMES DAILY
Qty: 28 | Refills: 0 | Status: ACTIVE | COMMUNITY
Start: 2024-01-09 | End: 1900-01-01

## 2024-02-15 LAB — CYTOLOGY CVX/VAG DOC THIN PREP: NORMAL

## 2024-02-22 ENCOUNTER — APPOINTMENT (OUTPATIENT)
Dept: RHEUMATOLOGY | Facility: CLINIC | Age: 59
End: 2024-02-22
Payer: COMMERCIAL

## 2024-02-22 VITALS
TEMPERATURE: 97.3 F | OXYGEN SATURATION: 99 % | HEART RATE: 85 BPM | WEIGHT: 170 LBS | HEIGHT: 68 IN | BODY MASS INDEX: 25.76 KG/M2 | DIASTOLIC BLOOD PRESSURE: 83 MMHG | SYSTOLIC BLOOD PRESSURE: 136 MMHG

## 2024-02-22 DIAGNOSIS — M17.12 UNILATERAL PRIMARY OSTEOARTHRITIS, LEFT KNEE: ICD-10-CM

## 2024-02-22 DIAGNOSIS — Z79.620 LONG TERM (CURRENT) USE OF IMMUNOSUPPRESSIVE BIOLOGIC: ICD-10-CM

## 2024-02-22 DIAGNOSIS — M16.11 UNILATERAL PRIMARY OSTEOARTHRITIS, RIGHT HIP: ICD-10-CM

## 2024-02-22 DIAGNOSIS — R76.8 OTHER SPECIFIED ABNORMAL IMMUNOLOGICAL FINDINGS IN SERUM: ICD-10-CM

## 2024-02-22 DIAGNOSIS — G37.3 ACUTE TRANSVERSE MYELITIS IN DEMYELINATING DISEASE OF CENTRAL NERVOUS SYSTEM: ICD-10-CM

## 2024-02-22 DIAGNOSIS — R89.4 ABNORMAL IMMUNOLOGICAL FINDINGS IN SPECIMENS FROM OTHER ORGANS, SYSTEMS AND TISSUES: ICD-10-CM

## 2024-02-22 DIAGNOSIS — G61.81 CHRONIC INFLAMMATORY DEMYELINATING POLYNEURITIS: ICD-10-CM

## 2024-02-22 DIAGNOSIS — Z79.899 OTHER LONG TERM (CURRENT) DRUG THERAPY: ICD-10-CM

## 2024-02-22 PROCEDURE — 99214 OFFICE O/P EST MOD 30 MIN: CPT

## 2024-02-23 LAB
APPEARANCE: CLEAR
BACTERIA: NEGATIVE /HPF
BILIRUBIN URINE: NEGATIVE
BLOOD URINE: NEGATIVE
CAST: 0 /LPF
COLOR: YELLOW
EPITHELIAL CELLS: 0 /HPF
GLUCOSE QUALITATIVE U: NEGATIVE MG/DL
KETONES URINE: NEGATIVE MG/DL
LEUKOCYTE ESTERASE URINE: NEGATIVE
MICROSCOPIC-UA: NORMAL
NITRITE URINE: NEGATIVE
PH URINE: 6
PROTEIN URINE: NEGATIVE MG/DL
RED BLOOD CELLS URINE: 0 /HPF
SPECIFIC GRAVITY URINE: 1.01
UROBILINOGEN URINE: 0.2 MG/DL
WHITE BLOOD CELLS URINE: 0 /HPF

## 2024-02-24 LAB — BACTERIA UR CULT: NORMAL

## 2024-02-26 PROBLEM — M16.11 PRIMARY OSTEOARTHRITIS OF RIGHT HIP: Status: ACTIVE | Noted: 2019-12-03

## 2024-02-26 PROBLEM — M17.12 PRIMARY OSTEOARTHRITIS OF LEFT KNEE: Status: ACTIVE | Noted: 2021-09-10

## 2024-03-24 NOTE — HISTORY OF PRESENT ILLNESS
[FreeTextEntry1] : Patient is a 58-year-old  4 para 2-0-2-2 last menstrual period 2019 Patient presents for evaluation complaining of possible vaginal yeast infection PAST MEDICAL HISTORY:  No pertinent past medical history

## 2024-05-02 ENCOUNTER — APPOINTMENT (OUTPATIENT)
Dept: RADIOLOGY | Facility: CLINIC | Age: 59
End: 2024-05-02
Payer: COMMERCIAL

## 2024-05-02 ENCOUNTER — OUTPATIENT (OUTPATIENT)
Dept: OUTPATIENT SERVICES | Facility: HOSPITAL | Age: 59
LOS: 1 days | End: 2024-05-02
Payer: COMMERCIAL

## 2024-05-02 DIAGNOSIS — Z00.00 ENCOUNTER FOR GENERAL ADULT MEDICAL EXAMINATION WITHOUT ABNORMAL FINDINGS: ICD-10-CM

## 2024-05-02 DIAGNOSIS — Z98.890 OTHER SPECIFIED POSTPROCEDURAL STATES: Chronic | ICD-10-CM

## 2024-05-02 DIAGNOSIS — Z00.8 ENCOUNTER FOR OTHER GENERAL EXAMINATION: ICD-10-CM

## 2024-05-02 PROCEDURE — 77080 DXA BONE DENSITY AXIAL: CPT

## 2024-05-02 PROCEDURE — 77080 DXA BONE DENSITY AXIAL: CPT | Mod: 26

## 2024-06-15 ENCOUNTER — NON-APPOINTMENT (OUTPATIENT)
Age: 59
End: 2024-06-15

## 2024-07-10 ENCOUNTER — APPOINTMENT (OUTPATIENT)
Dept: FAMILY MEDICINE | Facility: CLINIC | Age: 59
End: 2024-07-10

## 2024-07-10 VITALS
BODY MASS INDEX: 25.76 KG/M2 | HEIGHT: 68 IN | HEART RATE: 92 BPM | WEIGHT: 170 LBS | SYSTOLIC BLOOD PRESSURE: 126 MMHG | DIASTOLIC BLOOD PRESSURE: 94 MMHG | OXYGEN SATURATION: 98 %

## 2024-07-10 VITALS — SYSTOLIC BLOOD PRESSURE: 122 MMHG | DIASTOLIC BLOOD PRESSURE: 82 MMHG

## 2024-07-10 DIAGNOSIS — M85.80 OTHER SPECIFIED DISORDERS OF BONE DENSITY AND STRUCTURE, UNSPECIFIED SITE: ICD-10-CM

## 2024-07-10 PROCEDURE — 99396 PREV VISIT EST AGE 40-64: CPT

## 2024-07-10 RX ORDER — RITUXIMAB 10 MG/ML
INJECTION, SOLUTION INTRAVENOUS
Refills: 0 | Status: ACTIVE | COMMUNITY

## 2024-07-10 RX ORDER — CALCIUM CITRATE/VITAMIN D3 315MG-6.25
TABLET ORAL
Refills: 0 | Status: ACTIVE | COMMUNITY

## 2024-07-10 RX ORDER — FLUTICASONE PROPIONATE 50 UG/1
50 SPRAY, METERED NASAL
Refills: 0 | Status: ACTIVE | COMMUNITY

## 2024-07-11 ENCOUNTER — APPOINTMENT (OUTPATIENT)
Dept: RHEUMATOLOGY | Facility: CLINIC | Age: 59
End: 2024-07-11

## 2024-07-11 VITALS
BODY MASS INDEX: 25.76 KG/M2 | OXYGEN SATURATION: 99 % | WEIGHT: 170 LBS | DIASTOLIC BLOOD PRESSURE: 82 MMHG | TEMPERATURE: 97.9 F | SYSTOLIC BLOOD PRESSURE: 133 MMHG | HEART RATE: 78 BPM | HEIGHT: 68 IN

## 2024-07-11 DIAGNOSIS — R89.4 ABNORMAL IMMUNOLOGICAL FINDINGS IN SPECIMENS FROM OTHER ORGANS, SYSTEMS AND TISSUES: ICD-10-CM

## 2024-07-11 DIAGNOSIS — Z79.620 LONG TERM (CURRENT) USE OF IMMUNOSUPPRESSIVE BIOLOGIC: ICD-10-CM

## 2024-07-11 DIAGNOSIS — G37.9 DEMYELINATING DISEASE OF CENTRAL NERVOUS SYSTEM, UNSPECIFIED: ICD-10-CM

## 2024-07-11 PROCEDURE — 99214 OFFICE O/P EST MOD 30 MIN: CPT

## 2024-07-11 PROCEDURE — 36415 COLL VENOUS BLD VENIPUNCTURE: CPT

## 2024-07-12 LAB
ALBUMIN SERPL ELPH-MCNC: 4.7 G/DL
ALP BLD-CCNC: 97 U/L
ALT SERPL-CCNC: 20 U/L
ANION GAP SERPL CALC-SCNC: 12 MMOL/L
BASOPHILS # BLD AUTO: 0.03 K/UL
BASOPHILS NFR BLD AUTO: 0.5 %
BILIRUB SERPL-MCNC: 0.6 MG/DL
BUN SERPL-MCNC: 11 MG/DL
CALCIUM SERPL-MCNC: 9.5 MG/DL
CD16+CD56+ CELLS # BLD: 103 CELLS/UL
CD16+CD56+ CELLS NFR BLD: 5 %
CD19 CELLS NFR BLD: 35 CELLS/UL
CD3 CELLS # BLD: 2003 CELLS/UL
CD3+CD4+ CELLS # BLD: 1671 CELLS/UL
CD3+CD4+ CELLS NFR BLD: 76 %
CD3+CD4+ CELLS/CD3+CD8+ CLL SPEC: 4.89 RATIO
CD3+CD8+ CELLS # SPEC: 342 CELLS/UL
CD3+CD8+ CELLS NFR BLD: 15 %
CELLS.CD3-CD19+/CELLS IN BLOOD: 2 %
CHLORIDE SERPL-SCNC: 102 MMOL/L
CO2 SERPL-SCNC: 26 MMOL/L
CREAT SERPL-MCNC: 0.71 MG/DL
CRP SERPL-MCNC: <3 MG/L
EGFR: 98 ML/MIN/1.73M2
EOSINOPHIL # BLD AUTO: 0.51 K/UL
ERYTHROCYTE [SEDIMENTATION RATE] IN BLOOD BY WESTERGREN METHOD: 3 MM/HR
GLUCOSE SERPL-MCNC: 92 MG/DL
HCT VFR BLD CALC: 44.8 %
HGB BLD-MCNC: 14.3 G/DL
IGA SER QL IEP: 79 MG/DL
IGM SER QL IEP: 27 MG/DL
IMM GRANULOCYTES NFR BLD AUTO: 0.2 %
KAPPA LC CSF-MCNC: 0.93 MG/DL
KAPPA LC SERPL-MCNC: 1 MG/DL
LYMPHOCYTES # BLD AUTO: 2.2 K/UL
LYMPHOCYTES NFR BLD AUTO: 37.4 %
MAN DIFF?: NORMAL
MCHC RBC-ENTMCNC: 30.3 PG
MCV RBC AUTO: 94.9 FL
MONOCYTES # BLD AUTO: 0.39 K/UL
MONOCYTES NFR BLD AUTO: 6.6 %
NEUTROPHILS # BLD AUTO: 2.75 K/UL
NEUTROPHILS NFR BLD AUTO: 46.6 %
PLATELET # BLD AUTO: 226 K/UL
POTASSIUM SERPL-SCNC: 4.4 MMOL/L
PROT SERPL-MCNC: 7 G/DL
RBC # BLD: 4.72 M/UL
RBC # FLD: 13.7 %
SODIUM SERPL-SCNC: 140 MMOL/L
WBC # FLD AUTO: 5.89 K/UL

## 2024-07-18 LAB
ANTI-BETA2 GLYCOPROTEIN 1 IGG CONCENTRATION: 0.9 U/ML
ANTI-CARDIOLIPIN IGG CONCENTRATION: 1.5 GPL
ANTI-CARDIOLIPIN IGM CONCENTRATION: 1.2 MPL
ANTI-CENP IGG CONCENTRATION: <0.4 U/ML
ANTI-CYCLIC CITRULLINATED PEPTIDE IGG CONCENTRATION: 1.1 U/ML
ANTI-DOUBLE-STRANDED DNA IGG CONCENTRATION: 15.34 IU/ML
ANTI-JO-1 IGG CONCENTRATION: 0.9 U/ML
ANTI-NUCLEAR ANTIBODIES - CYTOPLASMIC PATTERN: NORMAL
ANTI-NUCLEAR ANTIBODIES - PRIMARY NUCLEAR PATTERN: NORMAL
ANTI-NUCLEAR ANTIBODIES - PRIMARY PATTERN TITER: NEGATIVE
ANTI-NUCLEAR ANTIBODIES IGG CONCENTRATION: 19.71 UNITS
ANTI-RNA POL III IGG CONCENTRATION: <0.7 U/ML
ANTI-RNP70 IGG CONCENTRATION: 1.1 U/ML
ANTI-RO52 IGG CONCENTRATION: 1 U/ML
ANTI-RO60 IGG CONCENTRATION: 1.7 U/ML
ANTI-SCL-70 IGG CONCENTRATION: <0.6 U/ML
ANTI-SMITH IGG CONCENTRATION: <0.7 U/ML
ANTI-SS-B (LA) IGG CONCENTRATION: 2.5 U/ML
ANTI-THYROGLOBULIN IGG CONCENTRATION: <12 IU/ML
ANTI-THYROID PEROXIDASE IGG CONCENTRATION: <4 IU/ML
ANTI-U1RNP IGG CONCENTRATION: 13 U/ML
AVISE LUPUS INDEX: -2
AVISE LUPUS RESULT: NEGATIVE
B-LYMPHOCYTE-BOUND C4D (BC4D) LEVEL: 16
ERYTHROCYTE-BOUND C4D (EC4D) LEVEL: 2
RHEUMATOID FACTOR (IGA) CONCENTRATION: 1.1 IU/ML
RHEUMATOID FACTOR (IGM) CONCENTRATION: 0.7 IU/ML

## 2024-07-19 ENCOUNTER — RESULT REVIEW (OUTPATIENT)
Age: 59
End: 2024-07-19

## 2024-07-19 ENCOUNTER — APPOINTMENT (OUTPATIENT)
Dept: MAMMOGRAPHY | Facility: CLINIC | Age: 59
End: 2024-07-19

## 2024-07-19 ENCOUNTER — APPOINTMENT (OUTPATIENT)
Dept: ULTRASOUND IMAGING | Facility: CLINIC | Age: 59
End: 2024-07-19

## 2024-07-19 ENCOUNTER — OUTPATIENT (OUTPATIENT)
Dept: OUTPATIENT SERVICES | Facility: HOSPITAL | Age: 59
LOS: 1 days | End: 2024-07-19
Payer: COMMERCIAL

## 2024-07-19 DIAGNOSIS — Z98.890 OTHER SPECIFIED POSTPROCEDURAL STATES: Chronic | ICD-10-CM

## 2024-07-19 DIAGNOSIS — N64.52 NIPPLE DISCHARGE: ICD-10-CM

## 2024-07-19 DIAGNOSIS — N60.19 DIFFUSE CYSTIC MASTOPATHY OF UNSPECIFIED BREAST: ICD-10-CM

## 2024-07-19 PROCEDURE — 77063 BREAST TOMOSYNTHESIS BI: CPT | Mod: 26

## 2024-07-19 PROCEDURE — 76641 ULTRASOUND BREAST COMPLETE: CPT

## 2024-07-19 PROCEDURE — 77067 SCR MAMMO BI INCL CAD: CPT | Mod: 26

## 2024-07-19 PROCEDURE — 76641 ULTRASOUND BREAST COMPLETE: CPT | Mod: 26,50

## 2024-07-19 PROCEDURE — 77063 BREAST TOMOSYNTHESIS BI: CPT

## 2024-07-19 PROCEDURE — 77067 SCR MAMMO BI INCL CAD: CPT

## 2024-07-23 ENCOUNTER — APPOINTMENT (OUTPATIENT)
Dept: PULMONOLOGY | Facility: CLINIC | Age: 59
End: 2024-07-23
Payer: COMMERCIAL

## 2024-07-23 VITALS
TEMPERATURE: 97.4 F | HEART RATE: 86 BPM | SYSTOLIC BLOOD PRESSURE: 134 MMHG | WEIGHT: 170 LBS | BODY MASS INDEX: 25.76 KG/M2 | DIASTOLIC BLOOD PRESSURE: 82 MMHG | RESPIRATION RATE: 16 BRPM | HEIGHT: 68 IN | OXYGEN SATURATION: 99 %

## 2024-07-23 DIAGNOSIS — R06.02 SHORTNESS OF BREATH: ICD-10-CM

## 2024-07-23 DIAGNOSIS — R93.89 ABNORMAL FINDINGS ON DIAGNOSTIC IMAGING OF OTHER SPECIFIED BODY STRUCTURES: ICD-10-CM

## 2024-07-23 DIAGNOSIS — J30.9 ALLERGIC RHINITIS, UNSPECIFIED: ICD-10-CM

## 2024-07-23 DIAGNOSIS — G61.81 CHRONIC INFLAMMATORY DEMYELINATING POLYNEURITIS: ICD-10-CM

## 2024-07-23 DIAGNOSIS — R06.83 SNORING: ICD-10-CM

## 2024-07-23 PROCEDURE — 95012 NITRIC OXIDE EXP GAS DETER: CPT

## 2024-07-23 PROCEDURE — 99214 OFFICE O/P EST MOD 30 MIN: CPT | Mod: 25

## 2024-07-23 PROCEDURE — 94010 BREATHING CAPACITY TEST: CPT

## 2024-07-23 NOTE — ADDENDUM
[FreeTextEntry1] :  Documented by Diya Gonsales acting as a scribe for Dr. Dewayne Greenfield on 07/23/2024 .   All medical record entries made by the Scribe were at my, Dr. Dewayne Greenfield's direction and personally dictated by me on 07/23/2024 . I have reviewed the chart and agree that the record accurately reflects my personal performance of the history, Physical exam, assessment, and plan. I have also personally directed, reviewed, and agree with the discharge instructions.

## 2024-07-23 NOTE — PROCEDURE
[FreeTextEntry1] : PFT reveals normal flows; FEV1 was 3.16 L which is 106.2 % of predicted with a normal flow volume loop. PFT's for performed to evaluate for SOB.   FENO was 25; a normal value being less than 25. Fractional exhaled nitric oxide (FENO) is regarded as a simple, noninvasive method for assessing eosinophilic airway inflammation. Produced by a variety of cells within the lung, nitric oxide (NO) concentrations are generally low in healthy individuals. However, high concentrations of NO appear to be involved in nonspecific host defense mechanisms and chronic inflammatory diseases such as asthma. The American Thoracic Society (ATS) therefore has strongly recommended using FENO to aid in the assessment, management, and long-term monitoring of eosinophilic airway inflammation and asthma, and for identifying steroid responsive individuals whose chronic respiratory symptoms may be caused by airway inflammation. In their 2011 clinical practice guideline, the ATS emphasizes the importance of using FENO.

## 2024-07-23 NOTE — HISTORY OF PRESENT ILLNESS
[TextBox_4] : Ms. CHOWDHURY is a 59 year old female presenting to the office today for a follow-up pulmonary evaluation. Her chief complaint is  -she notes feeling well  She notes exercising via peloton, walking and weights -she notes her knee is swelling around a month ago she notes sleep is good around 7-8 hours, restful - she notes her sinuses are controlled now -she notes her weight is stable but wants to lose more weight  She notes no new medications, vitamins, or supplements but is trying to pace out the Rituxan she is on. -she notes recent domestic travel -she notes she still has leg weakness    -Patient denies any headaches, nausea, vomiting, fever, chills, sweats, chest pain, chest pressure, palpitations, coughing, wheezing, fatigue, diarrhea, constipation, dysphagia, arthralgias, myalgias, dizziness, leg pain, itchy eyes, itchy ears, dysphonia, heartburn, reflux or sour taste in mouth.

## 2024-07-23 NOTE — ASSESSMENT
[FreeTextEntry1] : Ms. CHOWDHURY is a 59 year old female coming into the office today for a follow-up pulmonary evaluation with a prior history of chronic inflammatory demyelinating polyradiculoneuropathy, nasal polyps sinus surgery, fibrocystic breast, papilloma, osteoarthritis, anterior uveitis and S/p Covid-19 (05/2022, 12/2023) who now comes in for a follow-up pulmonary evaluation for SOB, ?abnormal chest x-ray, (moderate COPD), PND syndrome, snoring - stable, #1 issue is leg weakness (still)  The patient's SOB is felt to be multifactorial: - Overweight - Out-of Shape - Poor breathing mechanics - Pulmonary   -doubting any current underling lung disease despite the x-ray reading - Cardiac  Problem 1: doubting any current underling lung disease despite the x-ray reading - Full pft wnl - s/p alpha 1 antitrypsin level wnl  Problem 2: PND syndrome (Prior nasal polyps and AM cough) - continue Olopatadine 0.6% 1 sniff each nostril twice a day -on Benzonatate 200 mg TID (Tessalon Perles) and ipratropium bromide -Environmental measures for allergies were encouraged including mattress and pillow covers, air purifier, and environmental controls.  Problem 3: snoring - no indication this time for sleep study  Problem 4: Covid-19 05/2022, 12/2023 - Recommended SaNotize - No additional vaccine  Problem 5: chronic demyelinating polyradiculoneuropathy -neuro renew and SPM -recommended neurologic f/p -recommended rheumatologic f/p -Rituxin q 6 months  Problem 6: Abnormal chest X-ray - Read as "moderate COPD" ,more then likely represents elevated lung capacity and not underlined COPD, no indication for a chest scan  Problem 7: Cardiac -Recommended cardiac follow up evaluation with cardiologist if needed  Problem 8: Overweight/out of shape -recommended Berberine OTC supplement for visceral fat loss  - Weight loss, exercise, and diet control were discussed and are highly encouraged. Treatment options were given such as, aqua therapy, and contacting a nutritionist. Recommended to use the elliptical, stationary bike, less use of treadmill. Mindful eating was explained to the patient Obesity is associated with worsening asthma, shortness of breath, and potential for cardiac disease, diabetes, and other underlying medical conditions  Problem 9: Poor Breathing Mechanics - Recommended Wijoe Hof and [place] breathing techniques - Proper breathing techniques were reviewed with an emphasis of exhalation. Patient instructed to breath in for 1 second and out for four seconds. Patient was encouraged to not talk while walking.  Problem 10: Health maintenance -recommended far infrared sauna -recommended Epsom salt bath -s/p flu shot 2023 -recommended strep pneumonia vaccines: Prevnar-20 vaccine, followed by Pneumo vaccine 23 one year following -recommended early intervention for URIs -recommended regular osteoporosis evaluations-recommended early dermatological evaluations -recommended after the age of 50 to the age of 70, colonoscopy every 5 years  Follow up in 6 months pt is encouraged to call or fax the office with any questions or concerns. -Education provided to the PT regarding their visit and conditions.

## 2024-07-25 ENCOUNTER — APPOINTMENT (OUTPATIENT)
Dept: GASTROENTEROLOGY | Facility: CLINIC | Age: 59
End: 2024-07-25
Payer: COMMERCIAL

## 2024-07-25 VITALS
SYSTOLIC BLOOD PRESSURE: 122 MMHG | BODY MASS INDEX: 25.76 KG/M2 | OXYGEN SATURATION: 97 % | HEIGHT: 68 IN | HEART RATE: 72 BPM | DIASTOLIC BLOOD PRESSURE: 80 MMHG | WEIGHT: 170 LBS

## 2024-07-25 DIAGNOSIS — Z12.11 ENCOUNTER FOR SCREENING FOR MALIGNANT NEOPLASM OF COLON: ICD-10-CM

## 2024-07-25 PROCEDURE — 99203 OFFICE O/P NEW LOW 30 MIN: CPT

## 2024-07-25 NOTE — HISTORY OF PRESENT ILLNESS
[FreeTextEntry1] : 59-year-old female Family history of polyps, brother As far as patient aware, not advanced adenomas No family history of colon cancer Last colonoscopy 2020 without polypectomy Shortly after that colonoscopy patient diagnosed with uveitis, transverse myelitis, currently on Rituxan Consultation at that time was regarding some blood work suspicious for Crohn's disease We agreed at that time that in the absence of clinical complaints, would not pursue further diagnostic workup for Crohn's  GI wise, patient has largely remained asymptomatic since that last visit Here to discuss appropriate screening interval for her colon

## 2024-07-25 NOTE — REASON FOR VISIT
Hypertension is well regulated on current medication listed and reviewed in detail   [Consultation] : a consultation visit [FreeTextEntry1] : Screening evaluation

## 2024-07-25 NOTE — ASSESSMENT
[FreeTextEntry1] : Average risk for colon cancer and polyp Last colonoscopy 4+ years ago, no indication for repeat examination at this time Above discussed in detail with patient, expresses good understanding of my clinical impression   Patient referred by Dr. Theresa Vasquez

## 2024-08-02 ENCOUNTER — LABORATORY RESULT (OUTPATIENT)
Age: 59
End: 2024-08-02

## 2024-08-23 ENCOUNTER — APPOINTMENT (OUTPATIENT)
Dept: RHEUMATOLOGY | Facility: CLINIC | Age: 59
End: 2024-08-23

## 2024-10-18 NOTE — REASON FOR VISIT
Medication: Nifedipine passed protocol.   Last office visit date: 8/16/24  Next appointment scheduled?: No;  Patient was advised at last visit to follow up in 1 year    Number of refills given: 3  
[Follow-Up: _____] : a [unfilled] follow-up visit

## 2025-01-07 ENCOUNTER — APPOINTMENT (OUTPATIENT)
Dept: RHEUMATOLOGY | Facility: CLINIC | Age: 60
End: 2025-01-07
Payer: COMMERCIAL

## 2025-01-07 VITALS
SYSTOLIC BLOOD PRESSURE: 119 MMHG | BODY MASS INDEX: 25.76 KG/M2 | WEIGHT: 170 LBS | DIASTOLIC BLOOD PRESSURE: 83 MMHG | OXYGEN SATURATION: 100 % | HEIGHT: 68 IN | HEART RATE: 80 BPM

## 2025-01-07 DIAGNOSIS — R89.4 ABNORMAL IMMUNOLOGICAL FINDINGS IN SPECIMENS FROM OTHER ORGANS, SYSTEMS AND TISSUES: ICD-10-CM

## 2025-01-07 DIAGNOSIS — Z79.620 LONG TERM (CURRENT) USE OF IMMUNOSUPPRESSIVE BIOLOGIC: ICD-10-CM

## 2025-01-07 DIAGNOSIS — G61.81 CHRONIC INFLAMMATORY DEMYELINATING POLYNEURITIS: ICD-10-CM

## 2025-01-07 DIAGNOSIS — G37.9 DEMYELINATING DISEASE OF CENTRAL NERVOUS SYSTEM, UNSPECIFIED: ICD-10-CM

## 2025-01-07 DIAGNOSIS — Z79.899 OTHER LONG TERM (CURRENT) DRUG THERAPY: ICD-10-CM

## 2025-01-07 PROCEDURE — 36415 COLL VENOUS BLD VENIPUNCTURE: CPT

## 2025-01-07 PROCEDURE — 99214 OFFICE O/P EST MOD 30 MIN: CPT

## 2025-01-07 RX ORDER — CLOTRIMAZOLE AND BETAMETHASONE DIPROPIONATE 10; .5 MG/G; MG/G
1-0.05 CREAM TOPICAL TWICE DAILY
Qty: 1 | Refills: 0 | Status: ACTIVE | COMMUNITY
Start: 2025-01-07 | End: 1900-01-01

## 2025-01-08 LAB
25(OH)D3 SERPL-MCNC: 37.2 NG/ML
ALBUMIN SERPL ELPH-MCNC: 4.5 G/DL
ALP BLD-CCNC: 105 U/L
ALT SERPL-CCNC: 14 U/L
ANA SER QL IA: POSITIVE
ANION GAP SERPL CALC-SCNC: 13 MMOL/L
AST SERPL-CCNC: 14 U/L
BASOPHILS # BLD AUTO: 0.04 K/UL
BASOPHILS NFR BLD AUTO: 0.7 %
BILIRUB SERPL-MCNC: 0.5 MG/DL
BUN SERPL-MCNC: 13 MG/DL
C3 SERPL-MCNC: 128 MG/DL
C4 SERPL-MCNC: 23 MG/DL
CALCIUM SERPL-MCNC: 10 MG/DL
CD16+CD56+ CELLS # BLD: 130 CELLS/UL
CD16+CD56+ CELLS NFR BLD: 6 %
CD19 CELLS NFR BLD: 0 CELLS/UL
CD3 CELLS # BLD: 1954 CELLS/UL
CD3 CELLS NFR BLD: 93 %
CD3+CD4+ CELLS # BLD: 1640 CELLS/UL
CD3+CD4+ CELLS NFR BLD: 79 %
CD3+CD4+ CELLS/CD3+CD8+ CLL SPEC: 6.2 RATIO
CD3+CD8+ CELLS # SPEC: 265 CELLS/UL
CD3+CD8+ CELLS NFR BLD: 13 %
CELLS.CD3-CD19+/CELLS IN BLOOD: <1 %
CENTROMERE IGG SER-ACNC: <0.2 AL
CHLORIDE SERPL-SCNC: 102 MMOL/L
CHOLEST SERPL-MCNC: 265 MG/DL
CHROMATIN AB SERPL-ACNC: <0.2 AL
CO2 SERPL-SCNC: 28 MMOL/L
CREAT SERPL-MCNC: 0.66 MG/DL
CRP SERPL-MCNC: <3 MG/L
DEPRECATED KAPPA LC FREE/LAMBDA SER: 1.15 RATIO
DSDNA AB SER-ACNC: <1 IU/ML
EGFR: 101 ML/MIN/1.73M2
ENA JO1 AB SER IA-ACNC: <0.2 AL
ENA RNP AB SER IA-ACNC: >8 AL
ENA SCL70 IGG SER IA-ACNC: 0.2 AL
ENA SM AB SER IA-ACNC: <0.2 AL
ENA SS-A AB SER IA-ACNC: <0.2 AL
ENA SS-B AB SER IA-ACNC: <0.2 AL
EOSINOPHIL # BLD AUTO: 0.09 K/UL
EOSINOPHIL NFR BLD AUTO: 1.6 %
ERYTHROCYTE [SEDIMENTATION RATE] IN BLOOD BY WESTERGREN METHOD: 8 MM/HR
FOLATE SERPL-MCNC: 7 NG/ML
GLUCOSE SERPL-MCNC: 94 MG/DL
HCT VFR BLD CALC: 43.7 %
HDLC SERPL-MCNC: 110 MG/DL
HGB BLD-MCNC: 14 G/DL
IGA SER QL IEP: 62 MG/DL
IGG SER QL IEP: 837 MG/DL
IGM SER QL IEP: 25 MG/DL
IMM GRANULOCYTES NFR BLD AUTO: 0.2 %
KAPPA LC CSF-MCNC: 0.93 MG/DL
KAPPA LC SERPL-MCNC: 1.07 MG/DL
LDLC SERPL CALC-MCNC: 145 MG/DL
LYMPHOCYTES # BLD AUTO: 2 K/UL
LYMPHOCYTES NFR BLD AUTO: 35.6 %
MAN DIFF?: NORMAL
MCHC RBC-ENTMCNC: 30 PG
MCHC RBC-ENTMCNC: 32 G/DL
MCV RBC AUTO: 93.8 FL
MONOCYTES # BLD AUTO: 0.42 K/UL
MONOCYTES NFR BLD AUTO: 7.5 %
NEUTROPHILS # BLD AUTO: 3.06 K/UL
NEUTROPHILS NFR BLD AUTO: 54.4 %
NONHDLC SERPL-MCNC: 155 MG/DL
PLATELET # BLD AUTO: 278 K/UL
POTASSIUM SERPL-SCNC: 4.7 MMOL/L
PROT SERPL-MCNC: 6.8 G/DL
RBC # BLD: 4.66 M/UL
RBC # FLD: 13.2 %
RIBOSOMAL P AB SER IA-ACNC: <0.2 AL
SODIUM SERPL-SCNC: 142 MMOL/L
TRIGL SERPL-MCNC: 63 MG/DL
TSH SERPL-ACNC: 3.23 UIU/ML
VIT B12 SERPL-MCNC: 628 PG/ML
WBC # FLD AUTO: 5.62 K/UL

## 2025-01-09 LAB — TOTAL IGE SMQN RAST: 9 KU/L

## 2025-01-10 LAB — HISTAMINE BLD-MCNC: 0.25 NG/ML

## 2025-01-11 LAB — AQP4 H2O CHANNEL AB SERPL IA-ACNC: NEGATIVE

## 2025-01-13 ENCOUNTER — APPOINTMENT (OUTPATIENT)
Dept: OBGYN | Facility: CLINIC | Age: 60
End: 2025-01-13
Payer: COMMERCIAL

## 2025-01-13 VITALS
DIASTOLIC BLOOD PRESSURE: 87 MMHG | RESPIRATION RATE: 16 BRPM | SYSTOLIC BLOOD PRESSURE: 142 MMHG | BODY MASS INDEX: 26.07 KG/M2 | WEIGHT: 172 LBS | HEART RATE: 87 BPM | HEIGHT: 68 IN

## 2025-01-13 DIAGNOSIS — R92.8 OTHER ABNORMAL AND INCONCLUSIVE FINDINGS ON DIAGNOSTIC IMAGING OF BREAST: ICD-10-CM

## 2025-01-13 DIAGNOSIS — Z12.11 ENCOUNTER FOR SCREENING FOR MALIGNANT NEOPLASM OF COLON: ICD-10-CM

## 2025-01-13 DIAGNOSIS — Z87.09 PERSONAL HISTORY OF OTHER DISEASES OF THE RESPIRATORY SYSTEM: ICD-10-CM

## 2025-01-13 DIAGNOSIS — Z87.898 PERSONAL HISTORY OF OTHER SPECIFIED CONDITIONS: ICD-10-CM

## 2025-01-13 DIAGNOSIS — Z12.39 ENCOUNTER FOR OTHER SCREENING FOR MALIGNANT NEOPLASM OF BREAST: ICD-10-CM

## 2025-01-13 DIAGNOSIS — Z01.419 ENCOUNTER FOR GYNECOLOGICAL EXAMINATION (GENERAL) (ROUTINE) W/OUT ABNORMAL FINDINGS: ICD-10-CM

## 2025-01-13 DIAGNOSIS — R39.9 UNSPECIFIED SYMPTOMS AND SIGNS INVOLVING THE GENITOURINARY SYSTEM: ICD-10-CM

## 2025-01-13 DIAGNOSIS — Z11.51 ENCOUNTER FOR SCREENING FOR HUMAN PAPILLOMAVIRUS (HPV): ICD-10-CM

## 2025-01-13 DIAGNOSIS — Z12.4 ENCOUNTER FOR SCREENING FOR MALIGNANT NEOPLASM OF CERVIX: ICD-10-CM

## 2025-01-13 DIAGNOSIS — N94.9 UNSPECIFIED CONDITION ASSOCIATED WITH FEMALE GENITAL ORGANS AND MENSTRUAL CYCLE: ICD-10-CM

## 2025-01-13 PROCEDURE — 99213 OFFICE O/P EST LOW 20 MIN: CPT

## 2025-01-13 PROCEDURE — 99459 PELVIC EXAMINATION: CPT

## 2025-01-14 ENCOUNTER — LABORATORY RESULT (OUTPATIENT)
Age: 60
End: 2025-01-14

## 2025-01-15 LAB
APPEARANCE: CLEAR
BILIRUBIN URINE: NEGATIVE
BLOOD URINE: NEGATIVE
COLOR: YELLOW
GLUCOSE QUALITATIVE U: NEGATIVE MG/DL
KETONES URINE: NEGATIVE MG/DL
LEUKOCYTE ESTERASE URINE: ABNORMAL
NITRITE URINE: NEGATIVE
PH URINE: 7.5
PROTEIN URINE: NEGATIVE MG/DL
SPECIFIC GRAVITY URINE: 1.01
UROBILINOGEN URINE: 0.2 MG/DL

## 2025-01-18 ENCOUNTER — NON-APPOINTMENT (OUTPATIENT)
Age: 60
End: 2025-01-18

## 2025-01-21 ENCOUNTER — APPOINTMENT (OUTPATIENT)
Dept: PULMONOLOGY | Facility: CLINIC | Age: 60
End: 2025-01-21
Payer: COMMERCIAL

## 2025-01-21 ENCOUNTER — NON-APPOINTMENT (OUTPATIENT)
Age: 60
End: 2025-01-21

## 2025-01-21 ENCOUNTER — TRANSCRIPTION ENCOUNTER (OUTPATIENT)
Age: 60
End: 2025-01-21

## 2025-01-21 VITALS
HEART RATE: 77 BPM | OXYGEN SATURATION: 98 % | HEIGHT: 68 IN | TEMPERATURE: 97.7 F | WEIGHT: 172 LBS | DIASTOLIC BLOOD PRESSURE: 88 MMHG | SYSTOLIC BLOOD PRESSURE: 130 MMHG | RESPIRATION RATE: 16 BRPM | BODY MASS INDEX: 26.07 KG/M2

## 2025-01-21 DIAGNOSIS — R93.89 ABNORMAL FINDINGS ON DIAGNOSTIC IMAGING OF OTHER SPECIFIED BODY STRUCTURES: ICD-10-CM

## 2025-01-21 DIAGNOSIS — E66.3 OVERWEIGHT: ICD-10-CM

## 2025-01-21 DIAGNOSIS — G61.81 CHRONIC INFLAMMATORY DEMYELINATING POLYNEURITIS: ICD-10-CM

## 2025-01-21 DIAGNOSIS — R06.83 SNORING: ICD-10-CM

## 2025-01-21 DIAGNOSIS — R06.02 SHORTNESS OF BREATH: ICD-10-CM

## 2025-01-21 PROCEDURE — 94010 BREATHING CAPACITY TEST: CPT

## 2025-01-21 PROCEDURE — 94729 DIFFUSING CAPACITY: CPT

## 2025-01-21 PROCEDURE — 94727 GAS DIL/WSHOT DETER LNG VOL: CPT

## 2025-01-21 PROCEDURE — ZZZZZ: CPT

## 2025-01-21 PROCEDURE — 99214 OFFICE O/P EST MOD 30 MIN: CPT | Mod: 25

## 2025-01-23 ENCOUNTER — APPOINTMENT (OUTPATIENT)
Dept: PHYSICAL MEDICINE AND REHAB | Facility: CLINIC | Age: 60
End: 2025-01-23
Payer: COMMERCIAL

## 2025-01-23 VITALS
BODY MASS INDEX: 25.91 KG/M2 | RESPIRATION RATE: 14 BRPM | SYSTOLIC BLOOD PRESSURE: 119 MMHG | HEART RATE: 76 BPM | WEIGHT: 171 LBS | DIASTOLIC BLOOD PRESSURE: 81 MMHG | HEIGHT: 68 IN

## 2025-01-23 PROCEDURE — 99204 OFFICE O/P NEW MOD 45 MIN: CPT

## 2025-01-23 PROCEDURE — G2211 COMPLEX E/M VISIT ADD ON: CPT | Mod: NC

## 2025-01-23 RX ORDER — MELOXICAM 15 MG/1
15 TABLET ORAL
Refills: 0 | Status: ACTIVE | COMMUNITY

## 2025-01-27 ENCOUNTER — APPOINTMENT (OUTPATIENT)
Dept: ORTHOPEDIC SURGERY | Facility: CLINIC | Age: 60
End: 2025-01-27
Payer: COMMERCIAL

## 2025-01-27 VITALS
HEART RATE: 76 BPM | HEIGHT: 68 IN | BODY MASS INDEX: 26.22 KG/M2 | DIASTOLIC BLOOD PRESSURE: 88 MMHG | WEIGHT: 173 LBS | SYSTOLIC BLOOD PRESSURE: 136 MMHG

## 2025-01-27 DIAGNOSIS — M16.11 UNILATERAL PRIMARY OSTEOARTHRITIS, RIGHT HIP: ICD-10-CM

## 2025-01-27 PROCEDURE — 99214 OFFICE O/P EST MOD 30 MIN: CPT

## 2025-01-27 PROCEDURE — 73502 X-RAY EXAM HIP UNI 2-3 VIEWS: CPT

## 2025-01-27 RX ORDER — MELOXICAM 15 MG/1
15 TABLET ORAL DAILY
Qty: 30 | Refills: 1 | Status: ACTIVE | COMMUNITY
Start: 2025-01-27 | End: 1900-01-01

## 2025-01-28 ENCOUNTER — APPOINTMENT (OUTPATIENT)
Dept: PHYSICAL MEDICINE AND REHAB | Facility: CLINIC | Age: 60
End: 2025-01-28

## 2025-02-06 ENCOUNTER — APPOINTMENT (OUTPATIENT)
Age: 60
End: 2025-02-06

## 2025-02-06 VITALS
HEIGHT: 68 IN | WEIGHT: 172 LBS | OXYGEN SATURATION: 98 % | DIASTOLIC BLOOD PRESSURE: 80 MMHG | SYSTOLIC BLOOD PRESSURE: 122 MMHG | BODY MASS INDEX: 26.07 KG/M2 | HEART RATE: 89 BPM

## 2025-02-06 DIAGNOSIS — N90.5 ATROPHY OF VULVA: ICD-10-CM

## 2025-02-06 LAB
BILIRUB UR QL STRIP: NEGATIVE
CLARITY UR: CLEAR
COLLECTION METHOD: NORMAL
GLUCOSE UR-MCNC: NEGATIVE
HCG UR QL: 0.2 EU/DL
HGB UR QL STRIP.AUTO: NEGATIVE
KETONES UR-MCNC: NEGATIVE
LEUKOCYTE ESTERASE UR QL STRIP: NEGATIVE
NITRITE UR QL STRIP: NEGATIVE
PH UR STRIP: 7
PROT UR STRIP-MCNC: NEGATIVE
SP GR UR STRIP: 1.01

## 2025-02-06 PROCEDURE — 99459 PELVIC EXAMINATION: CPT | Mod: NC

## 2025-02-06 PROCEDURE — 81003 URINALYSIS AUTO W/O SCOPE: CPT | Mod: QW

## 2025-02-06 PROCEDURE — 51701 INSERT BLADDER CATHETER: CPT | Mod: 59

## 2025-02-06 PROCEDURE — 99204 OFFICE O/P NEW MOD 45 MIN: CPT | Mod: 25

## 2025-02-06 RX ORDER — ESTRADIOL 0.1 MG/G
0.1 CREAM VAGINAL
Qty: 42.5 | Refills: 0 | Status: ACTIVE | COMMUNITY
Start: 2025-02-06 | End: 1900-01-01

## 2025-02-17 ENCOUNTER — NON-APPOINTMENT (OUTPATIENT)
Age: 60
End: 2025-02-17

## 2025-02-17 ENCOUNTER — APPOINTMENT (OUTPATIENT)
Dept: OBGYN | Facility: CLINIC | Age: 60
End: 2025-02-17
Payer: COMMERCIAL

## 2025-02-17 VITALS
WEIGHT: 174 LBS | SYSTOLIC BLOOD PRESSURE: 135 MMHG | HEIGHT: 68 IN | DIASTOLIC BLOOD PRESSURE: 88 MMHG | HEART RATE: 81 BPM | BODY MASS INDEX: 26.37 KG/M2

## 2025-02-17 DIAGNOSIS — Z11.51 ENCOUNTER FOR SCREENING FOR HUMAN PAPILLOMAVIRUS (HPV): ICD-10-CM

## 2025-02-17 DIAGNOSIS — Z01.419 ENCOUNTER FOR GYNECOLOGICAL EXAMINATION (GENERAL) (ROUTINE) W/OUT ABNORMAL FINDINGS: ICD-10-CM

## 2025-02-17 DIAGNOSIS — R39.9 UNSPECIFIED SYMPTOMS AND SIGNS INVOLVING THE GENITOURINARY SYSTEM: ICD-10-CM

## 2025-02-17 DIAGNOSIS — N94.9 UNSPECIFIED CONDITION ASSOCIATED WITH FEMALE GENITAL ORGANS AND MENSTRUAL CYCLE: ICD-10-CM

## 2025-02-17 DIAGNOSIS — Z12.39 ENCOUNTER FOR OTHER SCREENING FOR MALIGNANT NEOPLASM OF BREAST: ICD-10-CM

## 2025-02-17 PROCEDURE — 99396 PREV VISIT EST AGE 40-64: CPT

## 2025-02-17 PROCEDURE — 99459 PELVIC EXAMINATION: CPT

## 2025-02-27 LAB — HPV HIGH+LOW RISK DNA PNL CVX: NOT DETECTED

## 2025-03-05 ENCOUNTER — APPOINTMENT (OUTPATIENT)
Dept: ORTHOPEDIC SURGERY | Facility: CLINIC | Age: 60
End: 2025-03-05
Payer: COMMERCIAL

## 2025-03-05 VITALS
HEIGHT: 68 IN | WEIGHT: 174 LBS | DIASTOLIC BLOOD PRESSURE: 82 MMHG | SYSTOLIC BLOOD PRESSURE: 136 MMHG | BODY MASS INDEX: 26.37 KG/M2

## 2025-03-05 DIAGNOSIS — M17.12 UNILATERAL PRIMARY OSTEOARTHRITIS, LEFT KNEE: ICD-10-CM

## 2025-03-05 DIAGNOSIS — M16.11 UNILATERAL PRIMARY OSTEOARTHRITIS, RIGHT HIP: ICD-10-CM

## 2025-03-05 DIAGNOSIS — M16.12 UNILATERAL PRIMARY OSTEOARTHRITIS, LEFT HIP: ICD-10-CM

## 2025-03-05 PROCEDURE — 99214 OFFICE O/P EST MOD 30 MIN: CPT

## 2025-03-25 ENCOUNTER — APPOINTMENT (OUTPATIENT)
Dept: BREAST CENTER | Facility: CLINIC | Age: 60
End: 2025-03-25
Payer: COMMERCIAL

## 2025-03-25 VITALS
WEIGHT: 174 LBS | HEART RATE: 81 BPM | HEIGHT: 68 IN | SYSTOLIC BLOOD PRESSURE: 133 MMHG | DIASTOLIC BLOOD PRESSURE: 81 MMHG | BODY MASS INDEX: 26.37 KG/M2

## 2025-03-25 DIAGNOSIS — Z12.31 ENCOUNTER FOR SCREENING MAMMOGRAM FOR MALIGNANT NEOPLASM OF BREAST: ICD-10-CM

## 2025-03-25 DIAGNOSIS — R92.30 DENSE BREASTS, UNSPECIFIED: ICD-10-CM

## 2025-03-25 PROCEDURE — 99212 OFFICE O/P EST SF 10 MIN: CPT

## 2025-03-26 DIAGNOSIS — Z82.49 FAMILY HISTORY OF ISCHEMIC HEART DISEASE AND OTHER DISEASES OF THE CIRCULATORY SYSTEM: ICD-10-CM

## 2025-03-26 DIAGNOSIS — Z83.42 FAMILY HISTORY OF FAMILIAL HYPERCHOLESTEROLEMIA: ICD-10-CM

## 2025-03-26 DIAGNOSIS — Z84.1 FAMILY HISTORY OF DISORDERS OF KIDNEY AND URETER: ICD-10-CM

## 2025-03-26 DIAGNOSIS — R33.9 RETENTION OF URINE, UNSPECIFIED: ICD-10-CM

## 2025-03-26 DIAGNOSIS — R35.1 NOCTURIA: ICD-10-CM

## 2025-03-26 DIAGNOSIS — R10.2 PELVIC AND PERINEAL PAIN: ICD-10-CM

## 2025-03-26 DIAGNOSIS — Z37.9 OUTCOME OF DELIVERY, UNSPECIFIED: ICD-10-CM

## 2025-03-26 DIAGNOSIS — Z85.850 PERSONAL HISTORY OF MALIGNANT NEOPLASM OF THYROID: ICD-10-CM

## 2025-03-26 DIAGNOSIS — N94.10 UNSPECIFIED DYSPAREUNIA: ICD-10-CM

## 2025-03-26 DIAGNOSIS — Z83.511 FAMILY HISTORY OF GLAUCOMA: ICD-10-CM

## 2025-03-31 ENCOUNTER — RX RENEWAL (OUTPATIENT)
Age: 60
End: 2025-03-31

## 2025-04-07 ENCOUNTER — APPOINTMENT (OUTPATIENT)
Dept: UROGYNECOLOGY | Facility: CLINIC | Age: 60
End: 2025-04-07

## 2025-04-28 ENCOUNTER — RX RENEWAL (OUTPATIENT)
Age: 60
End: 2025-04-28

## 2025-05-09 ENCOUNTER — APPOINTMENT (OUTPATIENT)
Dept: UROGYNECOLOGY | Facility: CLINIC | Age: 60
End: 2025-05-09

## 2025-05-09 VITALS — DIASTOLIC BLOOD PRESSURE: 83 MMHG | SYSTOLIC BLOOD PRESSURE: 120 MMHG | HEART RATE: 79 BPM

## 2025-05-09 DIAGNOSIS — N90.5 ATROPHY OF VULVA: ICD-10-CM

## 2025-05-09 PROCEDURE — 99213 OFFICE O/P EST LOW 20 MIN: CPT

## 2025-06-27 ENCOUNTER — APPOINTMENT (OUTPATIENT)
Dept: OBGYN | Facility: CLINIC | Age: 60
End: 2025-06-27
Payer: COMMERCIAL

## 2025-06-27 VITALS
SYSTOLIC BLOOD PRESSURE: 111 MMHG | HEIGHT: 68 IN | DIASTOLIC BLOOD PRESSURE: 78 MMHG | WEIGHT: 167 LBS | BODY MASS INDEX: 25.31 KG/M2 | HEART RATE: 94 BPM

## 2025-06-27 PROBLEM — B37.31 VAGINITIS DUE TO CANDIDA: Status: ACTIVE | Noted: 2025-06-27 | Resolved: 2025-07-27

## 2025-06-27 PROCEDURE — 99213 OFFICE O/P EST LOW 20 MIN: CPT

## 2025-06-27 PROCEDURE — 99459 PELVIC EXAMINATION: CPT | Mod: NC

## 2025-06-27 RX ORDER — METHYLPREDNISOLONE 4 MG/1
4 TABLET ORAL
Qty: 1 | Refills: 0 | Status: ACTIVE | COMMUNITY
Start: 2025-06-27 | End: 1900-01-01

## 2025-06-27 RX ORDER — FLUCONAZOLE 150 MG/1
150 TABLET ORAL
Qty: 1 | Refills: 1 | Status: ACTIVE | COMMUNITY
Start: 2025-06-27 | End: 1900-01-01

## 2025-06-30 ENCOUNTER — APPOINTMENT (OUTPATIENT)
Dept: ORTHOPEDIC SURGERY | Facility: CLINIC | Age: 60
End: 2025-06-30
Payer: COMMERCIAL

## 2025-06-30 VITALS
HEART RATE: 86 BPM | SYSTOLIC BLOOD PRESSURE: 133 MMHG | WEIGHT: 170 LBS | DIASTOLIC BLOOD PRESSURE: 86 MMHG | HEIGHT: 68 IN | BODY MASS INDEX: 25.76 KG/M2

## 2025-06-30 PROCEDURE — 99215 OFFICE O/P EST HI 40 MIN: CPT

## 2025-06-30 PROCEDURE — 73502 X-RAY EXAM HIP UNI 2-3 VIEWS: CPT

## 2025-07-02 LAB
A VAGINAE DNA VAG QL NAA+PROBE: NORMAL
BVAB2 DNA VAG QL NAA+PROBE: NORMAL
C KRUSEI DNA VAG QL NAA+PROBE: NEGATIVE
C KRUSEI DNA VAG QL NAA+PROBE: POSITIVE
C TRACH RRNA SPEC QL NAA+PROBE: NEGATIVE
CANDIDA DNA VAG QL NAA+PROBE: NEGATIVE
MEGA1 DNA VAG QL NAA+PROBE: NORMAL
N GONORRHOEA RRNA SPEC QL NAA+PROBE: NEGATIVE
T VAGINALIS RRNA SPEC QL NAA+PROBE: NEGATIVE

## 2025-07-05 ENCOUNTER — NON-APPOINTMENT (OUTPATIENT)
Age: 60
End: 2025-07-05

## 2025-07-08 ENCOUNTER — APPOINTMENT (OUTPATIENT)
Dept: RHEUMATOLOGY | Facility: CLINIC | Age: 60
End: 2025-07-08
Payer: COMMERCIAL

## 2025-07-08 VITALS — SYSTOLIC BLOOD PRESSURE: 139 MMHG | OXYGEN SATURATION: 100 % | DIASTOLIC BLOOD PRESSURE: 85 MMHG | HEART RATE: 89 BPM

## 2025-07-08 PROCEDURE — 99214 OFFICE O/P EST MOD 30 MIN: CPT

## 2025-07-08 PROCEDURE — 36415 COLL VENOUS BLD VENIPUNCTURE: CPT

## 2025-07-08 PROCEDURE — G2211 COMPLEX E/M VISIT ADD ON: CPT | Mod: NC

## 2025-07-09 LAB
ALBUMIN SERPL ELPH-MCNC: 4.4 G/DL
ALP BLD-CCNC: 98 U/L
ALT SERPL-CCNC: 26 U/L
ANA SCREEN BY IMMUNOASSAY: POSITIVE
ANION GAP SERPL CALC-SCNC: 10 MMOL/L
AST SERPL-CCNC: 24 U/L
BASOPHILS # BLD AUTO: 0.04 K/UL
BASOPHILS NFR BLD AUTO: 0.6 %
BILIRUB SERPL-MCNC: 0.4 MG/DL
BUN SERPL-MCNC: 13 MG/DL
CALCIUM SERPL-MCNC: 9.4 MG/DL
CD16+CD56+ CELLS # BLD: 236 CELLS/UL
CD16+CD56+ CELLS NFR BLD: 11 %
CD19 CELLS NFR BLD: 3 CELLS/UL
CD3 CELLS # BLD: 1937 CELLS/UL
CD3 CELLS NFR BLD: 88 %
CD3+CD4+ CELLS # BLD: 1693 CELLS/UL
CD3+CD4+ CELLS NFR BLD: 75 %
CD3+CD4+ CELLS/CD3+CD8+ CLL SPEC: 5.73 RATIO
CD3+CD8+ CELLS # SPEC: 295 CELLS/UL
CD3+CD8+ CELLS NFR BLD: 13 %
CELLS.CD3-CD19+/CELLS IN BLOOD: <1 %
CENTROMERE B AB SER-ACNC: <0.2 AL
CHLORIDE SERPL-SCNC: 103 MMOL/L
CHROMATIN AB SERPL-ACNC: <0.2 AL
CO2 SERPL-SCNC: 24 MMOL/L
CREAT SERPL-MCNC: 0.7 MG/DL
CRP SERPL-MCNC: <3 MG/L
DSDNA AB SER-ACNC: <1 IU/ML
EGFRCR SERPLBLD CKD-EPI 2021: 99 ML/MIN/1.73M2
ENA JO1 AB SER-ACNC: <0.2 AL
ENA RNP AB SER-ACNC: >8 AL
ENA SCL70 AB SER-ACNC: <0.2 AL
ENA SM AB SER-ACNC: <0.2 AL
ENA SS-A AB SER-ACNC: <0.2 AL
ENA SS-B AB SER-ACNC: <0.2 AL
EOSINOPHIL # BLD AUTO: 0.11 K/UL
EOSINOPHIL NFR BLD AUTO: 1.6 %
ERYTHROCYTE [SEDIMENTATION RATE] IN BLOOD BY WESTERGREN METHOD: 2 MM/HR
GLUCOSE SERPL-MCNC: 89 MG/DL
HCT VFR BLD CALC: 43.2 %
HGB BLD-MCNC: 13.7 G/DL
IMM GRANULOCYTES NFR BLD AUTO: 0.1 %
LYMPHOCYTES # BLD AUTO: 2.27 K/UL
LYMPHOCYTES NFR BLD AUTO: 33.6 %
MAN DIFF?: NORMAL
MCHC RBC-ENTMCNC: 29.4 PG
MCHC RBC-ENTMCNC: 31.7 G/DL
MCV RBC AUTO: 92.7 FL
MONOCYTES # BLD AUTO: 0.54 K/UL
MONOCYTES NFR BLD AUTO: 8 %
NEUTROPHILS # BLD AUTO: 3.79 K/UL
NEUTROPHILS NFR BLD AUTO: 56.1 %
PLATELET # BLD AUTO: 233 K/UL
POTASSIUM SERPL-SCNC: 4.5 MMOL/L
PROT SERPL-MCNC: 6.5 G/DL
RBC # BLD: 4.66 M/UL
RBC # FLD: 13.5 %
RIBOSOMAL P AB SER-ACNC: <0.2 AL
SODIUM SERPL-SCNC: 137 MMOL/L
VIABILITY: NORMAL
WBC # FLD AUTO: 6.76 K/UL

## 2025-07-15 ENCOUNTER — APPOINTMENT (OUTPATIENT)
Dept: PULMONOLOGY | Facility: CLINIC | Age: 60
End: 2025-07-15

## 2025-07-16 ENCOUNTER — APPOINTMENT (OUTPATIENT)
Dept: FAMILY MEDICINE | Facility: CLINIC | Age: 60
End: 2025-07-16
Payer: COMMERCIAL

## 2025-07-16 VITALS
SYSTOLIC BLOOD PRESSURE: 136 MMHG | OXYGEN SATURATION: 97 % | HEIGHT: 68 IN | BODY MASS INDEX: 26.37 KG/M2 | WEIGHT: 174 LBS | HEART RATE: 81 BPM | DIASTOLIC BLOOD PRESSURE: 88 MMHG

## 2025-07-16 PROCEDURE — 99396 PREV VISIT EST AGE 40-64: CPT

## 2025-07-16 RX ORDER — BACILLUS COAGULANS/INULIN 1B-250 MG
CAPSULE ORAL
Refills: 0 | Status: ACTIVE | COMMUNITY

## 2025-07-16 RX ORDER — PSYLLIUM HUSK 0.4 G
CAPSULE ORAL
Refills: 0 | Status: ACTIVE | COMMUNITY

## 2025-07-17 ENCOUNTER — APPOINTMENT (OUTPATIENT)
Dept: PULMONOLOGY | Facility: CLINIC | Age: 60
End: 2025-07-17
Payer: COMMERCIAL

## 2025-07-17 VITALS
RESPIRATION RATE: 14 BRPM | HEIGHT: 68 IN | TEMPERATURE: 97.7 F | HEART RATE: 91 BPM | SYSTOLIC BLOOD PRESSURE: 120 MMHG | OXYGEN SATURATION: 95 % | BODY MASS INDEX: 25.16 KG/M2 | DIASTOLIC BLOOD PRESSURE: 80 MMHG | WEIGHT: 166 LBS

## 2025-07-17 PROCEDURE — 94729 DIFFUSING CAPACITY: CPT

## 2025-07-17 PROCEDURE — 94727 GAS DIL/WSHOT DETER LNG VOL: CPT

## 2025-07-17 PROCEDURE — 99214 OFFICE O/P EST MOD 30 MIN: CPT | Mod: 25

## 2025-07-17 PROCEDURE — 94010 BREATHING CAPACITY TEST: CPT

## 2025-07-17 RX ORDER — AZELASTINE HYDROCHLORIDE 137 UG/1
0.1 SPRAY, METERED NASAL TWICE DAILY
Qty: 1 | Refills: 1 | Status: ACTIVE | COMMUNITY
Start: 2025-07-17 | End: 1900-01-01

## 2025-07-21 ENCOUNTER — APPOINTMENT (OUTPATIENT)
Dept: ORTHOPEDIC SURGERY | Facility: CLINIC | Age: 60
End: 2025-07-21
Payer: COMMERCIAL

## 2025-07-21 ENCOUNTER — NON-APPOINTMENT (OUTPATIENT)
Age: 60
End: 2025-07-21

## 2025-07-21 VITALS
DIASTOLIC BLOOD PRESSURE: 88 MMHG | BODY MASS INDEX: 26.22 KG/M2 | HEART RATE: 74 BPM | HEIGHT: 68 IN | SYSTOLIC BLOOD PRESSURE: 128 MMHG | WEIGHT: 173 LBS

## 2025-07-21 DIAGNOSIS — M16.11 UNILATERAL PRIMARY OSTEOARTHRITIS, RIGHT HIP: ICD-10-CM

## 2025-07-21 DIAGNOSIS — G61.81 CHRONIC INFLAMMATORY DEMYELINATING POLYNEURITIS: ICD-10-CM

## 2025-07-21 PROCEDURE — G2211 COMPLEX E/M VISIT ADD ON: CPT | Mod: NC

## 2025-07-21 PROCEDURE — 99214 OFFICE O/P EST MOD 30 MIN: CPT

## 2025-08-01 ENCOUNTER — LABORATORY RESULT (OUTPATIENT)
Age: 60
End: 2025-08-01

## 2025-08-01 ENCOUNTER — APPOINTMENT (OUTPATIENT)
Dept: OBGYN | Facility: CLINIC | Age: 60
End: 2025-08-01
Payer: COMMERCIAL

## 2025-08-01 VITALS — HEART RATE: 94 BPM | SYSTOLIC BLOOD PRESSURE: 116 MMHG | HEIGHT: 68 IN | DIASTOLIC BLOOD PRESSURE: 73 MMHG

## 2025-08-01 DIAGNOSIS — N76.0 ACUTE VAGINITIS: ICD-10-CM

## 2025-08-01 DIAGNOSIS — R39.9 UNSPECIFIED SYMPTOMS AND SIGNS INVOLVING THE GENITOURINARY SYSTEM: ICD-10-CM

## 2025-08-01 DIAGNOSIS — Z87.898 PERSONAL HISTORY OF OTHER SPECIFIED CONDITIONS: ICD-10-CM

## 2025-08-01 DIAGNOSIS — Z01.419 ENCOUNTER FOR GYNECOLOGICAL EXAMINATION (GENERAL) (ROUTINE) W/OUT ABNORMAL FINDINGS: ICD-10-CM

## 2025-08-01 DIAGNOSIS — N89.8 OTHER SPECIFIED NONINFLAMMATORY DISORDERS OF VAGINA: ICD-10-CM

## 2025-08-01 DIAGNOSIS — N94.9 UNSPECIFIED CONDITION ASSOCIATED WITH FEMALE GENITAL ORGANS AND MENSTRUAL CYCLE: ICD-10-CM

## 2025-08-01 DIAGNOSIS — R92.333 MAMMOGRAPHIC HETEROGENEOUS DENSITY, BILATERAL BREASTS: ICD-10-CM

## 2025-08-01 DIAGNOSIS — Z11.51 ENCOUNTER FOR SCREENING FOR HUMAN PAPILLOMAVIRUS (HPV): ICD-10-CM

## 2025-08-01 DIAGNOSIS — Z12.39 ENCOUNTER FOR OTHER SCREENING FOR MALIGNANT NEOPLASM OF BREAST: ICD-10-CM

## 2025-08-01 DIAGNOSIS — Z98.890 OTHER SPECIFIED POSTPROCEDURAL STATES: ICD-10-CM

## 2025-08-01 PROCEDURE — 99213 OFFICE O/P EST LOW 20 MIN: CPT

## 2025-08-01 PROCEDURE — 99459 PELVIC EXAMINATION: CPT | Mod: NC

## 2025-08-01 RX ORDER — METRONIDAZOLE 500 MG/1
500 TABLET ORAL TWICE DAILY
Qty: 14 | Refills: 1 | Status: ACTIVE | COMMUNITY
Start: 2025-08-01 | End: 1900-01-01

## 2025-08-04 PROBLEM — R39.9 UTI SYMPTOMS: Status: ACTIVE | Noted: 2025-08-04

## 2025-08-05 LAB
APPEARANCE: CLEAR
BILIRUBIN URINE: NEGATIVE
BLOOD URINE: NEGATIVE
COLOR: YELLOW
GLUCOSE QUALITATIVE U: NEGATIVE MG/DL
KETONES URINE: NEGATIVE MG/DL
LEUKOCYTE ESTERASE URINE: ABNORMAL
NITRITE URINE: NEGATIVE
PH URINE: 5.5
PROTEIN URINE: NEGATIVE MG/DL
SPECIFIC GRAVITY URINE: 1.02
UROBILINOGEN URINE: 0.2 MG/DL

## 2025-08-14 ENCOUNTER — APPOINTMENT (OUTPATIENT)
Dept: FAMILY MEDICINE | Facility: CLINIC | Age: 60
End: 2025-08-14

## 2025-08-21 ENCOUNTER — RESULT REVIEW (OUTPATIENT)
Age: 60
End: 2025-08-21

## 2025-08-21 ENCOUNTER — APPOINTMENT (OUTPATIENT)
Dept: OBGYN | Facility: CLINIC | Age: 60
End: 2025-08-21
Payer: COMMERCIAL

## 2025-08-21 ENCOUNTER — NON-APPOINTMENT (OUTPATIENT)
Age: 60
End: 2025-08-21

## 2025-08-21 ENCOUNTER — APPOINTMENT (OUTPATIENT)
Dept: MAMMOGRAPHY | Facility: CLINIC | Age: 60
End: 2025-08-21
Payer: COMMERCIAL

## 2025-08-21 ENCOUNTER — APPOINTMENT (OUTPATIENT)
Dept: ULTRASOUND IMAGING | Facility: CLINIC | Age: 60
End: 2025-08-21
Payer: COMMERCIAL

## 2025-08-21 ENCOUNTER — RX RENEWAL (OUTPATIENT)
Age: 60
End: 2025-08-21

## 2025-08-21 ENCOUNTER — OUTPATIENT (OUTPATIENT)
Dept: OUTPATIENT SERVICES | Facility: HOSPITAL | Age: 60
LOS: 1 days | End: 2025-08-21
Payer: COMMERCIAL

## 2025-08-21 VITALS
HEART RATE: 73 BPM | SYSTOLIC BLOOD PRESSURE: 131 MMHG | DIASTOLIC BLOOD PRESSURE: 89 MMHG | WEIGHT: 174 LBS | BODY MASS INDEX: 26.37 KG/M2 | HEIGHT: 68 IN

## 2025-08-21 DIAGNOSIS — R39.9 UNSPECIFIED SYMPTOMS AND SIGNS INVOLVING THE GENITOURINARY SYSTEM: ICD-10-CM

## 2025-08-21 DIAGNOSIS — Z98.890 OTHER SPECIFIED POSTPROCEDURAL STATES: Chronic | ICD-10-CM

## 2025-08-21 DIAGNOSIS — Z12.31 ENCOUNTER FOR SCREENING MAMMOGRAM FOR MALIGNANT NEOPLASM OF BREAST: ICD-10-CM

## 2025-08-21 DIAGNOSIS — R92.30 DENSE BREASTS, UNSPECIFIED: ICD-10-CM

## 2025-08-21 DIAGNOSIS — Z00.8 ENCOUNTER FOR OTHER GENERAL EXAMINATION: ICD-10-CM

## 2025-08-21 PROCEDURE — 77063 BREAST TOMOSYNTHESIS BI: CPT

## 2025-08-21 PROCEDURE — 77067 SCR MAMMO BI INCL CAD: CPT | Mod: 26

## 2025-08-21 PROCEDURE — 76641 ULTRASOUND BREAST COMPLETE: CPT

## 2025-08-21 PROCEDURE — 99213 OFFICE O/P EST LOW 20 MIN: CPT

## 2025-08-21 PROCEDURE — 76641 ULTRASOUND BREAST COMPLETE: CPT | Mod: 26,50

## 2025-08-21 PROCEDURE — 77067 SCR MAMMO BI INCL CAD: CPT

## 2025-08-21 PROCEDURE — 77063 BREAST TOMOSYNTHESIS BI: CPT | Mod: 26
